# Patient Record
Sex: MALE | Race: WHITE | URBAN - METROPOLITAN AREA
[De-identification: names, ages, dates, MRNs, and addresses within clinical notes are randomized per-mention and may not be internally consistent; named-entity substitution may affect disease eponyms.]

---

## 2017-01-01 ENCOUNTER — APPOINTMENT (OUTPATIENT)
Dept: CT IMAGING | Facility: CLINIC | Age: 77
End: 2017-01-01
Attending: EMERGENCY MEDICINE
Payer: COMMERCIAL

## 2017-01-01 ENCOUNTER — APPOINTMENT (OUTPATIENT)
Dept: GENERAL RADIOLOGY | Facility: CLINIC | Age: 77
End: 2017-01-01
Attending: EMERGENCY MEDICINE
Payer: COMMERCIAL

## 2017-01-01 ENCOUNTER — APPOINTMENT (OUTPATIENT)
Dept: PHYSICAL THERAPY | Facility: CLINIC | Age: 77
End: 2017-01-01
Attending: INTERNAL MEDICINE
Payer: COMMERCIAL

## 2017-01-01 ENCOUNTER — HOSPITAL ENCOUNTER (OUTPATIENT)
Facility: CLINIC | Age: 77
Setting detail: OBSERVATION
Discharge: NURSING, REHAB OR LONG-TERM CARE | End: 2017-04-04
Attending: EMERGENCY MEDICINE | Admitting: INTERNAL MEDICINE
Payer: COMMERCIAL

## 2017-01-01 ENCOUNTER — HOSPITAL ENCOUNTER (EMERGENCY)
Facility: CLINIC | Age: 77
Discharge: HOME | End: 2017-05-31
Attending: EMERGENCY MEDICINE | Admitting: EMERGENCY MEDICINE
Payer: COMMERCIAL

## 2017-01-01 VITALS
SYSTOLIC BLOOD PRESSURE: 104 MMHG | HEART RATE: 86 BPM | BODY MASS INDEX: 25.18 KG/M2 | OXYGEN SATURATION: 100 % | RESPIRATION RATE: 18 BRPM | DIASTOLIC BLOOD PRESSURE: 76 MMHG | TEMPERATURE: 97.3 F | WEIGHT: 170 LBS | HEIGHT: 69 IN

## 2017-01-01 VITALS
TEMPERATURE: 97.8 F | WEIGHT: 168.65 LBS | BODY MASS INDEX: 24.98 KG/M2 | OXYGEN SATURATION: 95 % | HEIGHT: 69 IN | HEART RATE: 67 BPM | RESPIRATION RATE: 16 BRPM | SYSTOLIC BLOOD PRESSURE: 135 MMHG | DIASTOLIC BLOOD PRESSURE: 59 MMHG

## 2017-01-01 DIAGNOSIS — R62.7 ADULT FAILURE TO THRIVE: ICD-10-CM

## 2017-01-01 DIAGNOSIS — R73.9 HYPERGLYCEMIA: ICD-10-CM

## 2017-01-01 DIAGNOSIS — F32.A DEPRESSION, UNSPECIFIED DEPRESSION TYPE: ICD-10-CM

## 2017-01-01 DIAGNOSIS — Z79.4 TYPE 2 DIABETES MELLITUS WITHOUT COMPLICATION, WITH LONG-TERM CURRENT USE OF INSULIN (H): ICD-10-CM

## 2017-01-01 DIAGNOSIS — E86.0 DEHYDRATION: ICD-10-CM

## 2017-01-01 DIAGNOSIS — E11.9 TYPE 2 DIABETES MELLITUS WITHOUT COMPLICATION, WITH LONG-TERM CURRENT USE OF INSULIN (H): ICD-10-CM

## 2017-01-01 DIAGNOSIS — R33.9 URINARY RETENTION: Primary | ICD-10-CM
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
## 2017-01-01 DIAGNOSIS — S09.90XA CLOSED HEAD INJURY, INITIAL ENCOUNTER: ICD-10-CM

## 2017-01-01 LAB
ALBUMIN SERPL-MCNC: 3.1 G/DL (ref 3.4–5)
ALBUMIN UR-MCNC: 30 MG/DL
ALP SERPL-CCNC: 60 U/L (ref 40–150)
ALT SERPL W P-5'-P-CCNC: 20 U/L (ref 0–70)
ANION GAP SERPL CALCULATED.3IONS-SCNC: 5 MMOL/L (ref 3–14)
ANION GAP SERPL CALCULATED.3IONS-SCNC: 7 MMOL/L (ref 3–14)
ANION GAP SERPL CALCULATED.3IONS-SCNC: 9 MMOL/L (ref 3–14)
APPEARANCE UR: CLEAR
AST SERPL W P-5'-P-CCNC: 11 U/L (ref 0–45)
BACTERIA SPEC CULT: NO GROWTH
BASOPHILS # BLD AUTO: 0 10E9/L (ref 0–0.2)
BASOPHILS # BLD AUTO: 0 10E9/L (ref 0–0.2)
BASOPHILS NFR BLD AUTO: 0.2 %
BASOPHILS NFR BLD AUTO: 0.7 %
BILIRUB SERPL-MCNC: 0.3 MG/DL (ref 0.2–1.3)
BILIRUB UR QL STRIP: ABNORMAL
BUN SERPL-MCNC: 24 MG/DL (ref 7–30)
BUN SERPL-MCNC: 25 MG/DL (ref 7–30)
BUN SERPL-MCNC: 34 MG/DL (ref 7–30)
C DIFF TOX B STL QL: NORMAL
CALCIUM SERPL-MCNC: 8.9 MG/DL (ref 8.5–10.1)
CALCIUM SERPL-MCNC: 9.1 MG/DL (ref 8.5–10.1)
CALCIUM SERPL-MCNC: 9.6 MG/DL (ref 8.5–10.1)
CHLORIDE SERPL-SCNC: 102 MMOL/L (ref 94–109)
CHLORIDE SERPL-SCNC: 104 MMOL/L (ref 94–109)
CHLORIDE SERPL-SCNC: 105 MMOL/L (ref 94–109)
CO2 BLDCOV-SCNC: 31 MMOL/L (ref 21–28)
CO2 SERPL-SCNC: 28 MMOL/L (ref 20–32)
CO2 SERPL-SCNC: 28 MMOL/L (ref 20–32)
CO2 SERPL-SCNC: 30 MMOL/L (ref 20–32)
COLOR UR AUTO: YELLOW
CREAT SERPL-MCNC: 0.66 MG/DL (ref 0.66–1.25)
CREAT SERPL-MCNC: 0.67 MG/DL (ref 0.66–1.25)
CREAT SERPL-MCNC: 0.67 MG/DL (ref 0.66–1.25)
CREAT SERPL-MCNC: 0.78 MG/DL (ref 0.66–1.25)
CREAT SERPL-MCNC: 1.06 MG/DL (ref 0.66–1.25)
DIFFERENTIAL METHOD BLD: ABNORMAL
DIFFERENTIAL METHOD BLD: ABNORMAL
EOSINOPHIL # BLD AUTO: 0 10E9/L (ref 0–0.7)
EOSINOPHIL # BLD AUTO: 0.1 10E9/L (ref 0–0.7)
EOSINOPHIL NFR BLD AUTO: 0.1 %
EOSINOPHIL NFR BLD AUTO: 3.2 %
ERYTHROCYTE [DISTWIDTH] IN BLOOD BY AUTOMATED COUNT: 13.7 % (ref 10–15)
ERYTHROCYTE [DISTWIDTH] IN BLOOD BY AUTOMATED COUNT: 15.1 % (ref 10–15)
GFR SERPL CREATININE-BSD FRML MDRD: 68 ML/MIN/1.7M2
GFR SERPL CREATININE-BSD FRML MDRD: ABNORMAL ML/MIN/1.7M2
GFR SERPL CREATININE-BSD FRML MDRD: ABNORMAL ML/MIN/1.7M2
GFR SERPL CREATININE-BSD FRML MDRD: NORMAL ML/MIN/1.7M2
GFR SERPL CREATININE-BSD FRML MDRD: NORMAL ML/MIN/1.7M2
GLUCOSE BLDC GLUCOMTR-MCNC: 110 MG/DL (ref 70–99)
GLUCOSE BLDC GLUCOMTR-MCNC: 130 MG/DL (ref 70–99)
GLUCOSE BLDC GLUCOMTR-MCNC: 137 MG/DL (ref 70–99)
GLUCOSE BLDC GLUCOMTR-MCNC: 157 MG/DL (ref 70–99)
GLUCOSE BLDC GLUCOMTR-MCNC: 167 MG/DL (ref 70–99)
GLUCOSE BLDC GLUCOMTR-MCNC: 175 MG/DL (ref 70–99)
GLUCOSE BLDC GLUCOMTR-MCNC: 177 MG/DL (ref 70–99)
GLUCOSE BLDC GLUCOMTR-MCNC: 179 MG/DL (ref 70–99)
GLUCOSE BLDC GLUCOMTR-MCNC: 184 MG/DL (ref 70–99)
GLUCOSE BLDC GLUCOMTR-MCNC: 191 MG/DL (ref 70–99)
GLUCOSE BLDC GLUCOMTR-MCNC: 193 MG/DL (ref 70–99)
GLUCOSE BLDC GLUCOMTR-MCNC: 198 MG/DL (ref 70–99)
GLUCOSE BLDC GLUCOMTR-MCNC: 199 MG/DL (ref 70–99)
GLUCOSE BLDC GLUCOMTR-MCNC: 199 MG/DL (ref 70–99)
GLUCOSE BLDC GLUCOMTR-MCNC: 200 MG/DL (ref 70–99)
GLUCOSE BLDC GLUCOMTR-MCNC: 203 MG/DL (ref 70–99)
GLUCOSE BLDC GLUCOMTR-MCNC: 208 MG/DL (ref 70–99)
GLUCOSE BLDC GLUCOMTR-MCNC: 210 MG/DL (ref 70–99)
GLUCOSE BLDC GLUCOMTR-MCNC: 211 MG/DL (ref 70–99)
GLUCOSE BLDC GLUCOMTR-MCNC: 211 MG/DL (ref 70–99)
GLUCOSE BLDC GLUCOMTR-MCNC: 226 MG/DL (ref 70–99)
GLUCOSE BLDC GLUCOMTR-MCNC: 228 MG/DL (ref 70–99)
GLUCOSE BLDC GLUCOMTR-MCNC: 229 MG/DL (ref 70–99)
GLUCOSE BLDC GLUCOMTR-MCNC: 235 MG/DL (ref 70–99)
GLUCOSE BLDC GLUCOMTR-MCNC: 236 MG/DL (ref 70–99)
GLUCOSE BLDC GLUCOMTR-MCNC: 237 MG/DL (ref 70–99)
GLUCOSE BLDC GLUCOMTR-MCNC: 250 MG/DL (ref 70–99)
GLUCOSE BLDC GLUCOMTR-MCNC: 252 MG/DL (ref 70–99)
GLUCOSE BLDC GLUCOMTR-MCNC: 254 MG/DL (ref 70–99)
GLUCOSE BLDC GLUCOMTR-MCNC: 263 MG/DL (ref 70–99)
GLUCOSE BLDC GLUCOMTR-MCNC: 280 MG/DL (ref 70–99)
GLUCOSE BLDC GLUCOMTR-MCNC: 290 MG/DL (ref 70–99)
GLUCOSE BLDC GLUCOMTR-MCNC: 292 MG/DL (ref 70–99)
GLUCOSE BLDC GLUCOMTR-MCNC: 300 MG/DL (ref 70–99)
GLUCOSE BLDC GLUCOMTR-MCNC: 304 MG/DL (ref 70–99)
GLUCOSE BLDC GLUCOMTR-MCNC: 315 MG/DL (ref 70–99)
GLUCOSE BLDC GLUCOMTR-MCNC: 326 MG/DL (ref 70–99)
GLUCOSE BLDC GLUCOMTR-MCNC: 331 MG/DL (ref 70–99)
GLUCOSE BLDC GLUCOMTR-MCNC: 345 MG/DL (ref 70–99)
GLUCOSE BLDC GLUCOMTR-MCNC: 345 MG/DL (ref 70–99)
GLUCOSE BLDC GLUCOMTR-MCNC: 351 MG/DL (ref 70–99)
GLUCOSE BLDC GLUCOMTR-MCNC: 367 MG/DL (ref 70–99)
GLUCOSE BLDC GLUCOMTR-MCNC: 370 MG/DL (ref 70–99)
GLUCOSE BLDC GLUCOMTR-MCNC: 71 MG/DL (ref 70–99)
GLUCOSE BLDC GLUCOMTR-MCNC: 87 MG/DL (ref 70–99)
GLUCOSE BLDC GLUCOMTR-MCNC: 87 MG/DL (ref 70–99)
GLUCOSE BLDC GLUCOMTR-MCNC: 93 MG/DL (ref 70–99)
GLUCOSE BLDC GLUCOMTR-MCNC: 95 MG/DL (ref 70–99)
GLUCOSE SERPL-MCNC: 220 MG/DL (ref 70–99)
GLUCOSE SERPL-MCNC: 246 MG/DL (ref 70–99)
GLUCOSE SERPL-MCNC: 273 MG/DL (ref 70–99)
GLUCOSE UR STRIP-MCNC: 500 MG/DL
HBA1C MFR BLD: 6.8 % (ref 4.3–6)
HCO3 BLDV-SCNC: NORMAL MMOL/L (ref 21–28)
HCT VFR BLD AUTO: 34.8 % (ref 40–53)
HCT VFR BLD AUTO: 39.6 % (ref 40–53)
HGB BLD-MCNC: 11.2 G/DL (ref 13.3–17.7)
HGB BLD-MCNC: 13 G/DL (ref 13.3–17.7)
HGB UR QL STRIP: NEGATIVE
HYALINE CASTS #/AREA URNS LPF: ABNORMAL /LPF (ref 0–2)
IMM GRANULOCYTES # BLD: 0 10E9/L (ref 0–0.4)
IMM GRANULOCYTES # BLD: 0 10E9/L (ref 0–0.4)
IMM GRANULOCYTES NFR BLD: 0.2 %
IMM GRANULOCYTES NFR BLD: 0.3 %
INR PPP: 1.06 (ref 0.86–1.14)
INTERPRETATION ECG - MUSE: NORMAL
KETONES UR STRIP-MCNC: 40 MG/DL
LACTATE BLD-SCNC: 1.7 MMOL/L (ref 0.7–2.1)
LACTATE BLD-SCNC: NORMAL MMOL/L (ref 0.7–2.1)
LEUKOCYTE ESTERASE UR QL STRIP: NEGATIVE
LYMPHOCYTES # BLD AUTO: 1.3 10E9/L (ref 0.8–5.3)
LYMPHOCYTES # BLD AUTO: 1.8 10E9/L (ref 0.8–5.3)
LYMPHOCYTES NFR BLD AUTO: 15.4 %
LYMPHOCYTES NFR BLD AUTO: 29.6 %
Lab: NORMAL
MCH RBC QN AUTO: 28.4 PG (ref 26.5–33)
MCH RBC QN AUTO: 28.6 PG (ref 26.5–33)
MCHC RBC AUTO-ENTMCNC: 32.2 G/DL (ref 31.5–36.5)
MCHC RBC AUTO-ENTMCNC: 32.8 G/DL (ref 31.5–36.5)
MCV RBC AUTO: 87 FL (ref 78–100)
MCV RBC AUTO: 89 FL (ref 78–100)
MICRO REPORT STATUS: NORMAL
MONOCYTES # BLD AUTO: 0.5 10E9/L (ref 0–1.3)
MONOCYTES # BLD AUTO: 1.2 10E9/L (ref 0–1.3)
MONOCYTES NFR BLD AUTO: 10.4 %
MONOCYTES NFR BLD AUTO: 10.6 %
NEUTROPHILS # BLD AUTO: 2.4 10E9/L (ref 1.6–8.3)
NEUTROPHILS # BLD AUTO: 8.5 10E9/L (ref 1.6–8.3)
NEUTROPHILS NFR BLD AUTO: 55.7 %
NEUTROPHILS NFR BLD AUTO: 73.6 %
NITRATE UR QL: NEGATIVE
NRBC # BLD AUTO: 0 10*3/UL
NRBC # BLD AUTO: 0 10*3/UL
NRBC BLD AUTO-RTO: 0 /100
NRBC BLD AUTO-RTO: 0 /100
PCO2 BLDV: 48 MM HG (ref 40–50)
PCO2 BLDV: NORMAL MM HG (ref 40–50)
PH BLDV: 7.42 PH (ref 7.32–7.43)
PH BLDV: NORMAL PH (ref 7.32–7.43)
PH UR STRIP: 5 PH (ref 5–7)
PLATELET # BLD AUTO: 179 10E9/L (ref 150–450)
PLATELET # BLD AUTO: 276 10E9/L (ref 150–450)
PO2 BLDV: 45 MM HG (ref 25–47)
PO2 BLDV: NORMAL MM HG (ref 25–47)
POTASSIUM SERPL-SCNC: 4.3 MMOL/L (ref 3.4–5.3)
POTASSIUM SERPL-SCNC: 4.3 MMOL/L (ref 3.4–5.3)
POTASSIUM SERPL-SCNC: 4.6 MMOL/L (ref 3.4–5.3)
PROT SERPL-MCNC: 6.8 G/DL (ref 6.8–8.8)
RBC # BLD AUTO: 3.92 10E12/L (ref 4.4–5.9)
RBC # BLD AUTO: 4.57 10E12/L (ref 4.4–5.9)
RBC #/AREA URNS AUTO: ABNORMAL /HPF (ref 0–2)
SAO2 % BLDV FROM PO2: 81 %
SAO2 % BLDV FROM PO2: NORMAL %
SODIUM SERPL-SCNC: 139 MMOL/L (ref 133–144)
SODIUM SERPL-SCNC: 139 MMOL/L (ref 133–144)
SODIUM SERPL-SCNC: 140 MMOL/L (ref 133–144)
SP GR UR STRIP: 1.02 (ref 1–1.03)
SPECIMEN SOURCE: NORMAL
TROPONIN I SERPL-MCNC: 0.02 UG/L (ref 0–0.04)
TSH SERPL DL<=0.005 MIU/L-ACNC: 0.76 MU/L (ref 0.4–4)
URN SPEC COLLECT METH UR: ABNORMAL
UROBILINOGEN UR STRIP-ACNC: 0.2 EU/DL (ref 0.2–1)
WBC # BLD AUTO: 11.6 10E9/L (ref 4–11)
WBC # BLD AUTO: 4.3 10E9/L (ref 4–11)
WBC #/AREA URNS AUTO: ABNORMAL /HPF (ref 0–2)

## 2017-01-01 PROCEDURE — 25000132 ZZH RX MED GY IP 250 OP 250 PS 637: Performed by: INTERNAL MEDICINE

## 2017-01-01 PROCEDURE — 96372 THER/PROPH/DIAG INJ SC/IM: CPT

## 2017-01-01 PROCEDURE — 00000146 ZZHCL STATISTIC GLUCOSE BY METER IP

## 2017-01-01 PROCEDURE — 25000131 ZZH RX MED GY IP 250 OP 636 PS 637: Mod: GY | Performed by: PHYSICIAN ASSISTANT

## 2017-01-01 PROCEDURE — 70450 CT HEAD/BRAIN W/O DYE: CPT

## 2017-01-01 PROCEDURE — 97161 PT EVAL LOW COMPLEX 20 MIN: CPT | Mod: GP | Performed by: PHYSICAL THERAPIST

## 2017-01-01 PROCEDURE — A9270 NON-COVERED ITEM OR SERVICE: HCPCS | Mod: GY | Performed by: PHYSICIAN ASSISTANT

## 2017-01-01 PROCEDURE — 25000131 ZZH RX MED GY IP 250 OP 636 PS 637: Performed by: HOSPITALIST

## 2017-01-01 PROCEDURE — 36415 COLL VENOUS BLD VENIPUNCTURE: CPT

## 2017-01-01 PROCEDURE — 99207 ZZC CDG-MDM COMPONENT: MEETS MODERATE - UP CODED: CPT | Performed by: INTERNAL MEDICINE

## 2017-01-01 PROCEDURE — 99225 ZZC SUBSEQUENT OBSERVATION CARE,LEVEL II: CPT | Performed by: INTERNAL MEDICINE

## 2017-01-01 PROCEDURE — 25000125 ZZHC RX 250: Performed by: INTERNAL MEDICINE

## 2017-01-01 PROCEDURE — 25000128 H RX IP 250 OP 636: Performed by: INTERNAL MEDICINE

## 2017-01-01 PROCEDURE — 99207 ZZC CDG-CODE CATEGORY CHANGED: CPT | Performed by: INTERNAL MEDICINE

## 2017-01-01 PROCEDURE — 83605 ASSAY OF LACTIC ACID: CPT

## 2017-01-01 PROCEDURE — 25000132 ZZH RX MED GY IP 250 OP 250 PS 637: Performed by: PHYSICIAN ASSISTANT

## 2017-01-01 PROCEDURE — 36415 COLL VENOUS BLD VENIPUNCTURE: CPT | Performed by: INTERNAL MEDICINE

## 2017-01-01 PROCEDURE — G0378 HOSPITAL OBSERVATION PER HR: HCPCS

## 2017-01-01 PROCEDURE — 25000128 H RX IP 250 OP 636: Performed by: PHYSICIAN ASSISTANT

## 2017-01-01 PROCEDURE — 93005 ELECTROCARDIOGRAM TRACING: CPT

## 2017-01-01 PROCEDURE — 99284 EMERGENCY DEPT VISIT MOD MDM: CPT | Mod: 25

## 2017-01-01 PROCEDURE — 25000132 ZZH RX MED GY IP 250 OP 250 PS 637: Performed by: PSYCHIATRY & NEUROLOGY

## 2017-01-01 PROCEDURE — 81001 URINALYSIS AUTO W/SCOPE: CPT | Performed by: INTERNAL MEDICINE

## 2017-01-01 PROCEDURE — 96361 HYDRATE IV INFUSION ADD-ON: CPT

## 2017-01-01 PROCEDURE — 87493 C DIFF AMPLIFIED PROBE: CPT | Performed by: INTERNAL MEDICINE

## 2017-01-01 PROCEDURE — 82565 ASSAY OF CREATININE: CPT | Performed by: INTERNAL MEDICINE

## 2017-01-01 PROCEDURE — 96360 HYDRATION IV INFUSION INIT: CPT

## 2017-01-01 PROCEDURE — 25000131 ZZH RX MED GY IP 250 OP 636 PS 637: Performed by: INTERNAL MEDICINE

## 2017-01-01 PROCEDURE — 99224 ZZC SUBSEQUENT OBSERVATION CARE,LEVEL I: CPT | Performed by: INTERNAL MEDICINE

## 2017-01-01 PROCEDURE — 84484 ASSAY OF TROPONIN QUANT: CPT | Performed by: EMERGENCY MEDICINE

## 2017-01-01 PROCEDURE — 25000125 ZZHC RX 250: Performed by: PHYSICIAN ASSISTANT

## 2017-01-01 PROCEDURE — 99224 ZZC SUBSEQUENT OBSERVATION CARE,LEVEL I: CPT | Performed by: HOSPITALIST

## 2017-01-01 PROCEDURE — 71020 XR CHEST 2 VW: CPT

## 2017-01-01 PROCEDURE — 82803 BLOOD GASES ANY COMBINATION: CPT

## 2017-01-01 PROCEDURE — 85610 PROTHROMBIN TIME: CPT | Performed by: EMERGENCY MEDICINE

## 2017-01-01 PROCEDURE — 99285 EMERGENCY DEPT VISIT HI MDM: CPT | Mod: 25

## 2017-01-01 PROCEDURE — 25000132 ZZH RX MED GY IP 250 OP 250 PS 637: Mod: GY | Performed by: PSYCHIATRY & NEUROLOGY

## 2017-01-01 PROCEDURE — 99222 1ST HOSP IP/OBS MODERATE 55: CPT | Performed by: PSYCHIATRY & NEUROLOGY

## 2017-01-01 PROCEDURE — A9270 NON-COVERED ITEM OR SERVICE: HCPCS | Mod: GY | Performed by: PSYCHIATRY & NEUROLOGY

## 2017-01-01 PROCEDURE — 80048 BASIC METABOLIC PNL TOTAL CA: CPT | Performed by: PHYSICIAN ASSISTANT

## 2017-01-01 PROCEDURE — 36415 COLL VENOUS BLD VENIPUNCTURE: CPT | Performed by: PHYSICIAN ASSISTANT

## 2017-01-01 PROCEDURE — 12000000 ZZH R&B MED SURG/OB

## 2017-01-01 PROCEDURE — 85025 COMPLETE CBC W/AUTO DIFF WBC: CPT | Performed by: EMERGENCY MEDICINE

## 2017-01-01 PROCEDURE — 25000128 H RX IP 250 OP 636: Performed by: EMERGENCY MEDICINE

## 2017-01-01 PROCEDURE — 99207 ZZC MOONLIGHTING INDICATOR: CPT | Performed by: INTERNAL MEDICINE

## 2017-01-01 PROCEDURE — 99207 ZZC CDG-MDM COMPONENT: MEETS LOW - DOWN CODED: CPT | Performed by: HOSPITALIST

## 2017-01-01 PROCEDURE — 99217 ZZC OBSERVATION CARE DISCHARGE: CPT | Performed by: HOSPITALIST

## 2017-01-01 PROCEDURE — 36415 COLL VENOUS BLD VENIPUNCTURE: CPT | Performed by: EMERGENCY MEDICINE

## 2017-01-01 PROCEDURE — 87086 URINE CULTURE/COLONY COUNT: CPT | Performed by: EMERGENCY MEDICINE

## 2017-01-01 PROCEDURE — 80048 BASIC METABOLIC PNL TOTAL CA: CPT | Performed by: EMERGENCY MEDICINE

## 2017-01-01 PROCEDURE — 99207 ZZC CDG-CODE CATEGORY CHANGED: CPT | Performed by: PHYSICIAN ASSISTANT

## 2017-01-01 PROCEDURE — 80053 COMPREHEN METABOLIC PANEL: CPT | Performed by: EMERGENCY MEDICINE

## 2017-01-01 PROCEDURE — 83036 HEMOGLOBIN GLYCOSYLATED A1C: CPT | Performed by: PHYSICIAN ASSISTANT

## 2017-01-01 PROCEDURE — 87040 BLOOD CULTURE FOR BACTERIA: CPT | Performed by: EMERGENCY MEDICINE

## 2017-01-01 PROCEDURE — 99232 SBSQ HOSP IP/OBS MODERATE 35: CPT | Performed by: PSYCHIATRY & NEUROLOGY

## 2017-01-01 PROCEDURE — 84443 ASSAY THYROID STIM HORMONE: CPT | Performed by: INTERNAL MEDICINE

## 2017-01-01 PROCEDURE — 25000132 ZZH RX MED GY IP 250 OP 250 PS 637: Mod: GY | Performed by: PHYSICIAN ASSISTANT

## 2017-01-01 PROCEDURE — 99220 ZZC INITIAL OBSERVATION CARE,LEVL III: CPT | Performed by: PHYSICIAN ASSISTANT

## 2017-01-01 PROCEDURE — 40000193 ZZH STATISTIC PT WARD VISIT: Performed by: PHYSICAL THERAPIST

## 2017-01-01 RX ORDER — AMOXICILLIN 250 MG
2 CAPSULE ORAL 3 TIMES DAILY
Status: DISCONTINUED | OUTPATIENT
Start: 2017-01-01 | End: 2017-01-01 | Stop reason: HOSPADM

## 2017-01-01 RX ORDER — SODIUM CHLORIDE 9 MG/ML
INJECTION, SOLUTION INTRAVENOUS CONTINUOUS
Status: DISCONTINUED | OUTPATIENT
Start: 2017-01-01 | End: 2017-01-01

## 2017-01-01 RX ORDER — ACETAMINOPHEN 650 MG/1
650 SUPPOSITORY RECTAL EVERY 4 HOURS PRN
Status: DISCONTINUED | OUTPATIENT
Start: 2017-01-01 | End: 2017-01-01 | Stop reason: HOSPADM

## 2017-01-01 RX ORDER — DEXTROSE MONOHYDRATE 25 G/50ML
25-50 INJECTION, SOLUTION INTRAVENOUS
Status: DISCONTINUED | OUTPATIENT
Start: 2017-01-01 | End: 2017-01-01 | Stop reason: HOSPADM

## 2017-01-01 RX ORDER — BUPROPION HYDROCHLORIDE 100 MG/1
100 TABLET, EXTENDED RELEASE ORAL 2 TIMES DAILY WITH MEALS
Qty: 60 TABLET | DISCHARGE
Start: 2017-01-01

## 2017-01-01 RX ORDER — BISACODYL 10 MG
10 SUPPOSITORY, RECTAL RECTAL DAILY PRN
COMMUNITY

## 2017-01-01 RX ORDER — NICOTINE POLACRILEX 4 MG
15-30 LOZENGE BUCCAL
Status: DISCONTINUED | OUTPATIENT
Start: 2017-01-01 | End: 2017-01-01 | Stop reason: HOSPADM

## 2017-01-01 RX ORDER — ONDANSETRON 2 MG/ML
4 INJECTION INTRAMUSCULAR; INTRAVENOUS EVERY 6 HOURS PRN
Status: DISCONTINUED | OUTPATIENT
Start: 2017-01-01 | End: 2017-01-01 | Stop reason: HOSPADM

## 2017-01-01 RX ORDER — NALOXONE HYDROCHLORIDE 0.4 MG/ML
.1-.4 INJECTION, SOLUTION INTRAMUSCULAR; INTRAVENOUS; SUBCUTANEOUS
Status: DISCONTINUED | OUTPATIENT
Start: 2017-01-01 | End: 2017-01-01 | Stop reason: HOSPADM

## 2017-01-01 RX ORDER — ACETAMINOPHEN 325 MG/1
650 TABLET ORAL EVERY 4 HOURS PRN
Status: DISCONTINUED | OUTPATIENT
Start: 2017-01-01 | End: 2017-01-01 | Stop reason: HOSPADM

## 2017-01-01 RX ORDER — MIRTAZAPINE 7.5 MG/1
7.5 TABLET, FILM COATED ORAL AT BEDTIME
Status: DISCONTINUED | OUTPATIENT
Start: 2017-01-01 | End: 2017-01-01 | Stop reason: HOSPADM

## 2017-01-01 RX ORDER — MIRTAZAPINE 7.5 MG/1
7.5 TABLET, FILM COATED ORAL AT BEDTIME
Qty: 30 TABLET | DISCHARGE
Start: 2017-01-01

## 2017-01-01 RX ORDER — POLYETHYLENE GLYCOL 3350 17 G/17G
17 POWDER, FOR SOLUTION ORAL DAILY PRN
COMMUNITY

## 2017-01-01 RX ORDER — ACETAMINOPHEN 500 MG
1000 TABLET ORAL 3 TIMES DAILY
Status: DISCONTINUED | OUTPATIENT
Start: 2017-01-01 | End: 2017-01-01 | Stop reason: HOSPADM

## 2017-01-01 RX ORDER — TAMSULOSIN HYDROCHLORIDE 0.4 MG/1
0.4 CAPSULE ORAL DAILY
Qty: 30 CAPSULE | DISCHARGE
Start: 2017-01-01

## 2017-01-01 RX ORDER — TAMSULOSIN HYDROCHLORIDE 0.4 MG/1
0.4 CAPSULE ORAL DAILY
Status: DISCONTINUED | OUTPATIENT
Start: 2017-01-01 | End: 2017-01-01 | Stop reason: HOSPADM

## 2017-01-01 RX ORDER — POLYETHYLENE GLYCOL 3350 17 G/17G
17 POWDER, FOR SOLUTION ORAL DAILY PRN
Status: DISCONTINUED | OUTPATIENT
Start: 2017-01-01 | End: 2017-01-01 | Stop reason: HOSPADM

## 2017-01-01 RX ORDER — ONDANSETRON 4 MG/1
4 TABLET, ORALLY DISINTEGRATING ORAL EVERY 6 HOURS PRN
Status: DISCONTINUED | OUTPATIENT
Start: 2017-01-01 | End: 2017-01-01 | Stop reason: HOSPADM

## 2017-01-01 RX ORDER — BUPROPION HYDROCHLORIDE 100 MG/1
100 TABLET, EXTENDED RELEASE ORAL 2 TIMES DAILY WITH MEALS
Status: DISCONTINUED | OUTPATIENT
Start: 2017-01-01 | End: 2017-01-01 | Stop reason: HOSPADM

## 2017-01-01 RX ORDER — AMOXICILLIN 250 MG
2 CAPSULE ORAL 3 TIMES DAILY
COMMUNITY

## 2017-01-01 RX ORDER — METOCLOPRAMIDE 5 MG/1
5 TABLET ORAL
Status: DISCONTINUED | OUTPATIENT
Start: 2017-01-01 | End: 2017-01-01

## 2017-01-01 RX ADMIN — PANCRELIPASE 24000 UNITS: 60000; 12000; 38000 CAPSULE, DELAYED RELEASE PELLETS ORAL at 09:28

## 2017-01-01 RX ADMIN — SODIUM CHLORIDE: 9 INJECTION, SOLUTION INTRAVENOUS at 21:24

## 2017-01-01 RX ADMIN — ACETAMINOPHEN 1000 MG: 500 TABLET, FILM COATED ORAL at 21:03

## 2017-01-01 RX ADMIN — SENNOSIDES AND DOCUSATE SODIUM 2 TABLET: 8.6; 5 TABLET ORAL at 09:27

## 2017-01-01 RX ADMIN — MIRTAZAPINE 7.5 MG: 7.5 TABLET, FILM COATED ORAL at 22:51

## 2017-01-01 RX ADMIN — ACETAMINOPHEN 1000 MG: 500 TABLET, FILM COATED ORAL at 09:13

## 2017-01-01 RX ADMIN — ACETAMINOPHEN 1000 MG: 500 TABLET, FILM COATED ORAL at 16:08

## 2017-01-01 RX ADMIN — PANCRELIPASE 24000 UNITS: 60000; 12000; 38000 CAPSULE, DELAYED RELEASE PELLETS ORAL at 09:04

## 2017-01-01 RX ADMIN — TAMSULOSIN HYDROCHLORIDE 0.4 MG: 0.4 CAPSULE ORAL at 08:55

## 2017-01-01 RX ADMIN — INSULIN ASPART 4 UNITS: 100 INJECTION, SOLUTION INTRAVENOUS; SUBCUTANEOUS at 22:04

## 2017-01-01 RX ADMIN — BUPROPION HYDROCHLORIDE 100 MG: 100 TABLET, FILM COATED, EXTENDED RELEASE ORAL at 17:58

## 2017-01-01 RX ADMIN — INSULIN ASPART 4 UNITS: 100 INJECTION, SOLUTION INTRAVENOUS; SUBCUTANEOUS at 18:10

## 2017-01-01 RX ADMIN — BUPROPION HYDROCHLORIDE 100 MG: 100 TABLET, FILM COATED, EXTENDED RELEASE ORAL at 17:52

## 2017-01-01 RX ADMIN — SODIUM CHLORIDE 1000 ML: 9 INJECTION, SOLUTION INTRAVENOUS at 18:01

## 2017-01-01 RX ADMIN — INSULIN ASPART 3 UNITS: 100 INJECTION, SOLUTION INTRAVENOUS; SUBCUTANEOUS at 11:02

## 2017-01-01 RX ADMIN — HYDRALAZINE HYDROCHLORIDE 12.5 MG: 25 TABLET ORAL at 16:00

## 2017-01-01 RX ADMIN — SENNOSIDES AND DOCUSATE SODIUM 2 TABLET: 8.6; 5 TABLET ORAL at 22:18

## 2017-01-01 RX ADMIN — ACETAMINOPHEN 1000 MG: 500 TABLET, FILM COATED ORAL at 21:31

## 2017-01-01 RX ADMIN — PANCRELIPASE 24000 UNITS: 60000; 12000; 38000 CAPSULE, DELAYED RELEASE PELLETS ORAL at 14:14

## 2017-01-01 RX ADMIN — ACETAMINOPHEN 1000 MG: 500 TABLET, FILM COATED ORAL at 11:28

## 2017-01-01 RX ADMIN — BUPROPION HYDROCHLORIDE 100 MG: 100 TABLET, FILM COATED, EXTENDED RELEASE ORAL at 09:00

## 2017-01-01 RX ADMIN — MIRTAZAPINE 7.5 MG: 7.5 TABLET, FILM COATED ORAL at 21:39

## 2017-01-01 RX ADMIN — MIRTAZAPINE 7.5 MG: 7.5 TABLET, FILM COATED ORAL at 21:59

## 2017-01-01 RX ADMIN — INSULIN HUMAN 4 UNITS: 100 INJECTION, SUSPENSION SUBCUTANEOUS at 11:29

## 2017-01-01 RX ADMIN — INSULIN ASPART 4 UNITS: 100 INJECTION, SOLUTION INTRAVENOUS; SUBCUTANEOUS at 22:08

## 2017-01-01 RX ADMIN — SODIUM CHLORIDE 1000 ML: 9 INJECTION, SOLUTION INTRAVENOUS at 19:43

## 2017-01-01 RX ADMIN — SODIUM CHLORIDE: 9 INJECTION, SOLUTION INTRAVENOUS at 05:05

## 2017-01-01 RX ADMIN — OMEPRAZOLE 20 MG: 20 CAPSULE, DELAYED RELEASE ORAL at 09:14

## 2017-01-01 RX ADMIN — OMEPRAZOLE 20 MG: 20 CAPSULE, DELAYED RELEASE ORAL at 08:55

## 2017-01-01 RX ADMIN — INSULIN ASPART 3 UNITS: 100 INJECTION, SOLUTION INTRAVENOUS; SUBCUTANEOUS at 21:48

## 2017-01-01 RX ADMIN — INSULIN ASPART 5 UNITS: 100 INJECTION, SOLUTION INTRAVENOUS; SUBCUTANEOUS at 18:22

## 2017-01-01 RX ADMIN — OMEPRAZOLE 20 MG: 20 CAPSULE, DELAYED RELEASE ORAL at 11:28

## 2017-01-01 RX ADMIN — BUPROPION HYDROCHLORIDE 100 MG: 100 TABLET, FILM COATED, EXTENDED RELEASE ORAL at 18:23

## 2017-01-01 RX ADMIN — INSULIN ASPART 3 UNITS: 100 INJECTION, SOLUTION INTRAVENOUS; SUBCUTANEOUS at 21:32

## 2017-01-01 RX ADMIN — BUPROPION HYDROCHLORIDE 100 MG: 100 TABLET, FILM COATED, EXTENDED RELEASE ORAL at 18:58

## 2017-01-01 RX ADMIN — BUPROPION HYDROCHLORIDE 100 MG: 100 TABLET, FILM COATED, EXTENDED RELEASE ORAL at 18:10

## 2017-01-01 RX ADMIN — INSULIN ASPART 3 UNITS: 100 INJECTION, SOLUTION INTRAVENOUS; SUBCUTANEOUS at 21:42

## 2017-01-01 RX ADMIN — INSULIN ASPART 2 UNITS: 100 INJECTION, SOLUTION INTRAVENOUS; SUBCUTANEOUS at 14:14

## 2017-01-01 RX ADMIN — SENNOSIDES AND DOCUSATE SODIUM 2 TABLET: 8.6; 5 TABLET ORAL at 22:01

## 2017-01-01 RX ADMIN — INSULIN ASPART 5 UNITS: 100 INJECTION, SOLUTION INTRAVENOUS; SUBCUTANEOUS at 18:01

## 2017-01-01 RX ADMIN — PANCRELIPASE 24000 UNITS: 60000; 12000; 38000 CAPSULE, DELAYED RELEASE PELLETS ORAL at 17:58

## 2017-01-01 RX ADMIN — PANCRELIPASE 24000 UNITS: 60000; 12000; 38000 CAPSULE, DELAYED RELEASE PELLETS ORAL at 18:23

## 2017-01-01 RX ADMIN — PANCRELIPASE 24000 UNITS: 60000; 12000; 38000 CAPSULE, DELAYED RELEASE PELLETS ORAL at 17:53

## 2017-01-01 RX ADMIN — SODIUM CHLORIDE 1000 ML: 9 INJECTION, SOLUTION INTRAVENOUS at 22:53

## 2017-01-01 RX ADMIN — OMEPRAZOLE 20 MG: 20 CAPSULE, DELAYED RELEASE ORAL at 09:28

## 2017-01-01 RX ADMIN — INSULIN GLARGINE 15 UNITS: 100 INJECTION, SOLUTION SUBCUTANEOUS at 21:47

## 2017-01-01 RX ADMIN — MIRTAZAPINE 7.5 MG: 7.5 TABLET, FILM COATED ORAL at 21:31

## 2017-01-01 RX ADMIN — BUPROPION HYDROCHLORIDE 100 MG: 100 TABLET, FILM COATED, EXTENDED RELEASE ORAL at 09:28

## 2017-01-01 RX ADMIN — RIVAROXABAN 20 MG: 20 TABLET, FILM COATED ORAL at 17:29

## 2017-01-01 RX ADMIN — OMEPRAZOLE 20 MG: 20 CAPSULE, DELAYED RELEASE ORAL at 09:05

## 2017-01-01 RX ADMIN — TAMSULOSIN HYDROCHLORIDE 0.4 MG: 0.4 CAPSULE ORAL at 09:05

## 2017-01-01 RX ADMIN — MIRTAZAPINE 7.5 MG: 7.5 TABLET, FILM COATED ORAL at 22:02

## 2017-01-01 RX ADMIN — BUPROPION HYDROCHLORIDE 100 MG: 100 TABLET, FILM COATED, EXTENDED RELEASE ORAL at 08:55

## 2017-01-01 RX ADMIN — PANCRELIPASE 24000 UNITS: 60000; 12000; 38000 CAPSULE, DELAYED RELEASE PELLETS ORAL at 12:28

## 2017-01-01 RX ADMIN — OMEPRAZOLE 20 MG: 20 CAPSULE, DELAYED RELEASE ORAL at 09:27

## 2017-01-01 RX ADMIN — SENNOSIDES AND DOCUSATE SODIUM 2 TABLET: 8.6; 5 TABLET ORAL at 17:52

## 2017-01-01 RX ADMIN — ACETAMINOPHEN 1000 MG: 500 TABLET, FILM COATED ORAL at 16:06

## 2017-01-01 RX ADMIN — MIRTAZAPINE 7.5 MG: 7.5 TABLET, FILM COATED ORAL at 21:02

## 2017-01-01 RX ADMIN — BUPROPION HYDROCHLORIDE 100 MG: 100 TABLET, FILM COATED, EXTENDED RELEASE ORAL at 11:28

## 2017-01-01 RX ADMIN — SENNOSIDES AND DOCUSATE SODIUM 2 TABLET: 8.6; 5 TABLET ORAL at 09:12

## 2017-01-01 RX ADMIN — PANCRELIPASE 24000 UNITS: 60000; 12000; 38000 CAPSULE, DELAYED RELEASE PELLETS ORAL at 18:17

## 2017-01-01 RX ADMIN — PANCRELIPASE 24000 UNITS: 60000; 12000; 38000 CAPSULE, DELAYED RELEASE PELLETS ORAL at 08:54

## 2017-01-01 RX ADMIN — SENNOSIDES AND DOCUSATE SODIUM 2 TABLET: 8.6; 5 TABLET ORAL at 08:55

## 2017-01-01 RX ADMIN — ACETAMINOPHEN 1000 MG: 500 TABLET, FILM COATED ORAL at 17:51

## 2017-01-01 RX ADMIN — ACETAMINOPHEN 1000 MG: 500 TABLET, FILM COATED ORAL at 17:58

## 2017-01-01 RX ADMIN — SENNOSIDES AND DOCUSATE SODIUM 2 TABLET: 8.6; 5 TABLET ORAL at 16:25

## 2017-01-01 RX ADMIN — PANCRELIPASE 24000 UNITS: 60000; 12000; 38000 CAPSULE, DELAYED RELEASE PELLETS ORAL at 17:29

## 2017-01-01 RX ADMIN — INSULIN ASPART 2 UNITS: 100 INJECTION, SOLUTION INTRAVENOUS; SUBCUTANEOUS at 09:28

## 2017-01-01 RX ADMIN — RIVAROXABAN 20 MG: 20 TABLET, FILM COATED ORAL at 18:58

## 2017-01-01 RX ADMIN — ACETAMINOPHEN 1000 MG: 500 TABLET, FILM COATED ORAL at 22:52

## 2017-01-01 RX ADMIN — PANCRELIPASE 24000 UNITS: 60000; 12000; 38000 CAPSULE, DELAYED RELEASE PELLETS ORAL at 09:14

## 2017-01-01 RX ADMIN — SODIUM CHLORIDE: 9 INJECTION, SOLUTION INTRAVENOUS at 14:27

## 2017-01-01 RX ADMIN — INSULIN ASPART 2 UNITS: 100 INJECTION, SOLUTION INTRAVENOUS; SUBCUTANEOUS at 10:14

## 2017-01-01 RX ADMIN — INSULIN GLARGINE 10 UNITS: 100 INJECTION, SOLUTION SUBCUTANEOUS at 22:02

## 2017-01-01 RX ADMIN — PANCRELIPASE 24000 UNITS: 60000; 12000; 38000 CAPSULE, DELAYED RELEASE PELLETS ORAL at 08:59

## 2017-01-01 RX ADMIN — SENNOSIDES AND DOCUSATE SODIUM 2 TABLET: 8.6; 5 TABLET ORAL at 22:51

## 2017-01-01 RX ADMIN — INSULIN GLARGINE 10 UNITS: 100 INJECTION, SOLUTION SUBCUTANEOUS at 22:04

## 2017-01-01 RX ADMIN — PANCRELIPASE 24000 UNITS: 60000; 12000; 38000 CAPSULE, DELAYED RELEASE PELLETS ORAL at 13:07

## 2017-01-01 RX ADMIN — LIDOCAINE HYDROCHLORIDE 10 ML: 20 JELLY TOPICAL at 18:24

## 2017-01-01 RX ADMIN — ACETAMINOPHEN 1000 MG: 500 TABLET, FILM COATED ORAL at 16:44

## 2017-01-01 RX ADMIN — SODIUM CHLORIDE: 9 INJECTION, SOLUTION INTRAVENOUS at 23:58

## 2017-01-01 RX ADMIN — PANCRELIPASE 24000 UNITS: 60000; 12000; 38000 CAPSULE, DELAYED RELEASE PELLETS ORAL at 13:24

## 2017-01-01 RX ADMIN — RIVAROXABAN 20 MG: 20 TABLET, FILM COATED ORAL at 17:52

## 2017-01-01 RX ADMIN — RIVAROXABAN 20 MG: 20 TABLET, FILM COATED ORAL at 17:46

## 2017-01-01 RX ADMIN — PANCRELIPASE 24000 UNITS: 60000; 12000; 38000 CAPSULE, DELAYED RELEASE PELLETS ORAL at 12:29

## 2017-01-01 RX ADMIN — INSULIN ASPART 4 UNITS: 100 INJECTION, SOLUTION INTRAVENOUS; SUBCUTANEOUS at 18:58

## 2017-01-01 RX ADMIN — INSULIN GLARGINE 15 UNITS: 100 INJECTION, SOLUTION SUBCUTANEOUS at 22:02

## 2017-01-01 RX ADMIN — ACETAMINOPHEN 1000 MG: 500 TABLET, FILM COATED ORAL at 09:27

## 2017-01-01 RX ADMIN — INSULIN GLARGINE 10 UNITS: 100 INJECTION, SOLUTION SUBCUTANEOUS at 22:08

## 2017-01-01 RX ADMIN — TAMSULOSIN HYDROCHLORIDE 0.4 MG: 0.4 CAPSULE ORAL at 09:27

## 2017-01-01 RX ADMIN — OMEPRAZOLE 20 MG: 20 CAPSULE, DELAYED RELEASE ORAL at 09:00

## 2017-01-01 RX ADMIN — OMEPRAZOLE 20 MG: 20 CAPSULE, DELAYED RELEASE ORAL at 09:12

## 2017-01-01 RX ADMIN — INSULIN ASPART 1 UNITS: 100 INJECTION, SOLUTION INTRAVENOUS; SUBCUTANEOUS at 22:03

## 2017-01-01 RX ADMIN — PANCRELIPASE 24000 UNITS: 60000; 12000; 38000 CAPSULE, DELAYED RELEASE PELLETS ORAL at 18:10

## 2017-01-01 RX ADMIN — PANCRELIPASE 24000 UNITS: 60000; 12000; 38000 CAPSULE, DELAYED RELEASE PELLETS ORAL at 11:28

## 2017-01-01 RX ADMIN — SODIUM CHLORIDE 1000 ML: 9 INJECTION, SOLUTION INTRAVENOUS at 01:16

## 2017-01-01 RX ADMIN — ACETAMINOPHEN 1000 MG: 500 TABLET, FILM COATED ORAL at 22:18

## 2017-01-01 RX ADMIN — BUPROPION HYDROCHLORIDE 100 MG: 100 TABLET, FILM COATED, EXTENDED RELEASE ORAL at 10:14

## 2017-01-01 RX ADMIN — INSULIN GLARGINE 7 UNITS: 100 INJECTION, SOLUTION SUBCUTANEOUS at 01:17

## 2017-01-01 RX ADMIN — RIVAROXABAN 20 MG: 20 TABLET, FILM COATED ORAL at 18:18

## 2017-01-01 RX ADMIN — ACETAMINOPHEN 1000 MG: 500 TABLET, FILM COATED ORAL at 18:10

## 2017-01-01 RX ADMIN — PANCRELIPASE 24000 UNITS: 60000; 12000; 38000 CAPSULE, DELAYED RELEASE PELLETS ORAL at 09:13

## 2017-01-01 RX ADMIN — TAMSULOSIN HYDROCHLORIDE 0.4 MG: 0.4 CAPSULE ORAL at 16:45

## 2017-01-01 RX ADMIN — METOCLOPRAMIDE HYDROCHLORIDE 5 MG: 5 TABLET ORAL at 06:49

## 2017-01-01 RX ADMIN — ACETAMINOPHEN 1000 MG: 500 TABLET, FILM COATED ORAL at 09:05

## 2017-01-01 RX ADMIN — SENNOSIDES AND DOCUSATE SODIUM 2 TABLET: 8.6; 5 TABLET ORAL at 16:08

## 2017-01-01 RX ADMIN — BUPROPION HYDROCHLORIDE 100 MG: 100 TABLET, FILM COATED, EXTENDED RELEASE ORAL at 11:04

## 2017-01-01 RX ADMIN — ACETAMINOPHEN 1000 MG: 500 TABLET, FILM COATED ORAL at 22:02

## 2017-01-01 RX ADMIN — INSULIN ASPART 2 UNITS: 100 INJECTION, SOLUTION INTRAVENOUS; SUBCUTANEOUS at 13:08

## 2017-01-01 RX ADMIN — PANCRELIPASE 24000 UNITS: 60000; 12000; 38000 CAPSULE, DELAYED RELEASE PELLETS ORAL at 09:00

## 2017-01-01 RX ADMIN — RIVAROXABAN 20 MG: 20 TABLET, FILM COATED ORAL at 17:59

## 2017-01-01 RX ADMIN — TAMSULOSIN HYDROCHLORIDE 0.4 MG: 0.4 CAPSULE ORAL at 09:14

## 2017-01-01 RX ADMIN — RIVAROXABAN 20 MG: 20 TABLET, FILM COATED ORAL at 18:23

## 2017-01-01 RX ADMIN — ACETAMINOPHEN 1000 MG: 500 TABLET, FILM COATED ORAL at 09:28

## 2017-01-01 RX ADMIN — PANCRELIPASE 24000 UNITS: 60000; 12000; 38000 CAPSULE, DELAYED RELEASE PELLETS ORAL at 09:27

## 2017-01-01 RX ADMIN — BUPROPION HYDROCHLORIDE 100 MG: 100 TABLET, FILM COATED, EXTENDED RELEASE ORAL at 09:13

## 2017-01-01 RX ADMIN — BUPROPION HYDROCHLORIDE 100 MG: 100 TABLET, FILM COATED, EXTENDED RELEASE ORAL at 09:05

## 2017-01-01 RX ADMIN — TAMSULOSIN HYDROCHLORIDE 0.4 MG: 0.4 CAPSULE ORAL at 09:00

## 2017-01-01 RX ADMIN — SODIUM CHLORIDE: 9 INJECTION, SOLUTION INTRAVENOUS at 09:52

## 2017-01-01 RX ADMIN — ACETAMINOPHEN 1000 MG: 500 TABLET, FILM COATED ORAL at 16:25

## 2017-01-01 RX ADMIN — RIVAROXABAN 20 MG: 20 TABLET, FILM COATED ORAL at 18:10

## 2017-01-01 RX ADMIN — BUPROPION HYDROCHLORIDE 100 MG: 100 TABLET, FILM COATED, EXTENDED RELEASE ORAL at 18:18

## 2017-01-01 RX ADMIN — SENNOSIDES AND DOCUSATE SODIUM 1 TABLET: 8.6; 5 TABLET ORAL at 22:02

## 2017-01-01 RX ADMIN — INSULIN ASPART 3 UNITS: 100 INJECTION, SOLUTION INTRAVENOUS; SUBCUTANEOUS at 12:29

## 2017-01-01 RX ADMIN — ACETAMINOPHEN 1000 MG: 500 TABLET, FILM COATED ORAL at 21:39

## 2017-01-01 RX ADMIN — INSULIN GLARGINE 15 UNITS: 100 INJECTION, SOLUTION SUBCUTANEOUS at 21:31

## 2017-01-01 RX ADMIN — BUPROPION HYDROCHLORIDE 100 MG: 100 TABLET, FILM COATED, EXTENDED RELEASE ORAL at 17:29

## 2017-01-01 RX ADMIN — INSULIN ASPART 2 UNITS: 100 INJECTION, SOLUTION INTRAVENOUS; SUBCUTANEOUS at 17:45

## 2017-01-01 RX ADMIN — ACETAMINOPHEN 1000 MG: 500 TABLET, FILM COATED ORAL at 08:55

## 2017-01-01 RX ADMIN — SENNOSIDES AND DOCUSATE SODIUM 2 TABLET: 8.6; 5 TABLET ORAL at 21:02

## 2017-01-01 RX ADMIN — SENNOSIDES AND DOCUSATE SODIUM 2 TABLET: 8.6; 5 TABLET ORAL at 21:39

## 2017-01-01 RX ADMIN — SENNOSIDES AND DOCUSATE SODIUM 2 TABLET: 8.6; 5 TABLET ORAL at 09:05

## 2017-01-01 RX ADMIN — PANCRELIPASE 24000 UNITS: 60000; 12000; 38000 CAPSULE, DELAYED RELEASE PELLETS ORAL at 17:52

## 2017-01-01 RX ADMIN — INSULIN ASPART 2 UNITS: 100 INJECTION, SOLUTION INTRAVENOUS; SUBCUTANEOUS at 11:27

## 2017-01-01 RX ADMIN — PANCRELIPASE 24000 UNITS: 60000; 12000; 38000 CAPSULE, DELAYED RELEASE PELLETS ORAL at 17:46

## 2017-01-01 RX ADMIN — INSULIN GLARGINE 10 UNITS: 100 INJECTION, SOLUTION SUBCUTANEOUS at 21:40

## 2017-01-01 RX ADMIN — PANCRELIPASE 24000 UNITS: 60000; 12000; 38000 CAPSULE, DELAYED RELEASE PELLETS ORAL at 13:41

## 2017-01-01 RX ADMIN — BUPROPION HYDROCHLORIDE 100 MG: 100 TABLET, FILM COATED, EXTENDED RELEASE ORAL at 17:46

## 2017-01-01 RX ADMIN — ACETAMINOPHEN 1000 MG: 500 TABLET, FILM COATED ORAL at 22:01

## 2017-01-01 RX ADMIN — ACETAMINOPHEN 1000 MG: 500 TABLET, FILM COATED ORAL at 08:59

## 2017-01-01 RX ADMIN — BUPROPION HYDROCHLORIDE 100 MG: 100 TABLET, FILM COATED, EXTENDED RELEASE ORAL at 09:27

## 2017-01-01 RX ADMIN — INSULIN GLARGINE 15 UNITS: 100 INJECTION, SOLUTION SUBCUTANEOUS at 22:03

## 2017-01-01 RX ADMIN — INSULIN GLARGINE 7 UNITS: 100 INJECTION, SOLUTION SUBCUTANEOUS at 22:18

## 2017-01-01 RX ADMIN — SENNOSIDES AND DOCUSATE SODIUM 2 TABLET: 8.6; 5 TABLET ORAL at 17:58

## 2017-01-01 RX ADMIN — ACETAMINOPHEN 1000 MG: 500 TABLET, FILM COATED ORAL at 21:59

## 2017-01-01 RX ADMIN — SENNOSIDES AND DOCUSATE SODIUM 2 TABLET: 8.6; 5 TABLET ORAL at 19:52

## 2017-01-01 RX ADMIN — MIRTAZAPINE 7.5 MG: 7.5 TABLET, FILM COATED ORAL at 22:18

## 2017-01-01 RX ADMIN — OMEPRAZOLE 20 MG: 20 CAPSULE, DELAYED RELEASE ORAL at 10:17

## 2017-01-01 ASSESSMENT — ENCOUNTER SYMPTOMS
ABDOMINAL PAIN: 0
FEVER: 0
NAUSEA: 1
WEAKNESS: 1
APPETITE CHANGE: 1
HEADACHES: 1
COUGH: 0
ABDOMINAL PAIN: 0

## 2017-01-01 ASSESSMENT — PAIN DESCRIPTION - DESCRIPTORS: DESCRIPTORS: SHARP

## 2017-03-25 NOTE — IP AVS SNAPSHOT
` `     [place] [address] ONCOLOGY: [phone]                 INTERAGENCY TRANSFER FORM - NOTES (H&P, Discharge Summary, Consults, Procedures, Therapies)   3/25/2017                    Hospital Admission Date: 3/25/2017  REGINALD RILEY   : 1940  Sex: Male        Patient PCP Information     Provider PCP Type    None General         History & Physicals      H&P signed by Isis Smart PA-C at 3/26/2017  7:26 PM  Also signed by Rachel Muniz DO at 3/26/2017  8:20 PM      Author:  Isis Smart PA-C Service:  Hospitalist Author Type:  Physician Assistant - C    Filed:  3/26/2017  7:26 PM Date of Service:  3/25/2017  9:20 PM Note Created:  3/25/2017 10:39 PM    Status:  Attested :  Isis Smart PA-C (Physician Assistant - C)    Cosigner:  Rachel Muniz DO at 3/26/2017  8:20 PM        Attestation signed by Rachel Muniz DO at 3/26/2017  8:20 PM        Physician Attestation   IRachel, have reviewed and discussed with the advanced practice provider their history, physical and plan for Reginald Riley. I did not participate in a shared visit by interviewing or examining the patient and this should be billed as an advanced practice provider only visit.    Rachel Muniz  Date of Service (when I saw the patient): I did not personally see this patient today.                               PRIMARY CARE PROVIDER:  None.      CHIEF COMPLAINT:  Failure to thrive.      HISTORY OF PRESENT ILLNESS:  Reginald Riley is a 76-year-old male with a complicated past medical history including ampullary cancer, status post Whipple procedure on 2015, hypertension, insulin-dependent type 2 diabetes, slow transit constipation, gastroparesis, a questionable history of  undiagnosis of Parkinson's disease, brought to [facility] from his newly established long-term care facility for evaluation of failure to thrive.  History is  obtained entirely from patient's daughter and review of medical record.  The patient underwent a Whipple procedure on 04/2015 for ampullary cancer.  This is reportedly a complicated surgery with a prolonged postoperative recovery.  He required a feeding tube and has had issues with gastroparesis and slow transit constipation since the procedure.  Daughter notes he has never fully returned back to baseline.  He has slowly transitioned over the past few years from living independently into assisted living and now into long-term care.  He was most recently living in an assisted living facility in [place].  In mid-February he was admitted to [facility] for evaluation of DKA in the setting of healthcare-acquired pneumonia.  Additionally, he was diagnosed with atrial fibrillation, initiated on Xarelto.  He was discharged to a transitional care facility due to significant weakness and failure to thrive.  Reportedly, at the transitional care facility he was unwilling or unable to participate in therapy.  He exceeded his number of days Medicare for transitional care facility and so he was discharged to a long-term care facility, [place], 10 days ago.  Daughter notes that over the past 10 days he has refused to get out of bed.  He has refused most of his meals and has been refusing medications.  The facility became concerned and sent him to the Emergency Department for further evaluation.      In the Emergency Department, he was evaluated by Dr. Bailey.  Vitals were stable upon arrival.  Laboratory evaluation was obtained which showed a blood glucose of 246, CBC with WBC 11.6, BNP largely unremarkable.  A chest x-ray was obtained and was negative.  Given the patient's failure to thrive, admission was requested for further evaluation.      Presently the patient is evaluated in the Emergency Department.  He offers few complaints at this time.  He answers questions minimally.  He is alert and oriented to person  "and place.  He does not know the month, but did know the president.  Daughter notes she feels as though her dad has \"lost the will to live\".  The patient does not really elaborate on this.  It does appear as though he was previously evaluated by mental health but has refused pharmacotherapy in the past.  Additionally, he has a questionable diagnosis of Parkinson's.  He has never been evaluated by Neurology.  During a previous hospitalization at [facility] outpatient neurologic workup was recommended; however, it does appear that this was ever pursued.      PAST MEDICAL HISTORY:   1.  Insulin-dependent type 2 diabetes.   2.  History of AML.   3.  History of ampullary cancer, status post Whipple procedure.   4.  Hypertension.   5.  Slow transit constipations with gastroparesis.      PRIOR TO ADMISSION MEDICATIONS:[KG1.1]  Prior to Admission medications    Medication Sig Last Dose Taking? Auth Provider   Prochlorperazine Maleate (COMPAZINE PO) Take 5 mg by mouth every 6 hours as needed for nausea prn Yes Unknown, Entered By History   amylase-lipase-protease (CREON 12) 29812 UNITS CPEP Take 1 capsule by mouth Take with snacks or supplements Give at 20:00 3/24/2017 at 20:00 Yes Unknown, Entered By History   amylase-lipase-protease (CREON 12) 77509 UNITS CPEP Take 2 capsules by mouth 3 times daily (with meals) Take at 08:00, 12:00, 17:00 3/25/2017 at 12:00 Yes Unknown, Entered By History   ACETAMINOPHEN PO Take 1,000 mg by mouth 3 times daily For shoulder pain. 3/25/2017 at 12:00 Yes Unknown, Entered By History   bisacodyl (DULCOLAX) 10 MG Suppository Place 10 mg rectally daily as needed for constipation prn Yes Unknown, Entered By History   METFORMIN HCL PO Take 1,000 mg by mouth 2 times daily (with meals) 3/25/2017 at 8:00 Yes Unknown, Entered By History   insulin lispro (HUMALOG KWIKPEN) 100 UNIT/ML injection Inject 2 Units Subcutaneous 3 times daily (before meals) 07:30, 11:45, 17:15 3/25/2017 at 17:15 Yes Unknown, " Entered By History   insulin glargine (LANTUS) 100 UNIT/ML injection Inject 10 Units Subcutaneous At Bedtime 3/24/2017 at hs Yes Unknown, Entered By History   Omeprazole (PRILOSEC PO) Take 20 mg by mouth every morning 3/25/2017 at 07:00 Yes Unknown, Entered By History   Metoclopramide HCl (REGLAN PO) Take 5 mg by mouth 3 times daily 07:30, 11:30, 17:00 3/25/2017 at 11:30 Yes Unknown, Entered By History   RIVAROXABAN PO Take 20 mg by mouth daily 3/24/2017 at 17:30 Yes Unknown, Entered By History   senna-docusate (SENOKOT-S;PERICOLACE) 8.6-50 MG per tablet Take 2 tablets by mouth 3 times daily 3/25/2017 at 12:00 Yes Unknown, Entered By History   polyethylene glycol (MIRALAX/GLYCOLAX) Packet Take 17 g by mouth daily as needed for constipation prn Yes Unknown, Entered By History[KG1.2]       PAST SURGICAL HISTORY:   1.  Cataracts.   2.  Prostatectomy.   3.  Appendectomy.   4.  ERCP.   5.  Whipple procedure.      FAMILY HISTORY:  Sister with diabetes.      SOCIAL HISTORY:  He is a nonsmoker, does not drink alcohol.  He is .  Currently living in a long-term care facility.      REVIEW OF SYSTEMS:  A 10-point review of systems was completed.  Pertinent positives in the HPI.  All other systems negative.      PHYSICAL EXAMINATION:     GENERAL:  Reginald Riley is a well-developed, well-nourished 76-year-old male who appears his stated age and is lying in bed.   VITAL SIGNS:  Blood pressure is 111/47, heart rate 98, temperature 96.2.   HEENT:  Head normocephalic, atraumatic.  Eyes:  Pupils equal, round, reactive to light.   NECK:  Supple, no adenopathy, no thyromegaly.   CARDIOVASCULAR:  Irregularly irregular, no murmurs noted.   PULMONARY:  Normal effort.  Clear to auscultation bilaterally.   GASTROINTESTINAL:  Abdomen nondistended, nontender, diminished bowel sounds.   EXTREMITIES:  Moves all 4 extremities, trace lower extremity edema.   NEUROLOGIC:  He is alert to person and place, knows the year, but not the month,  knows the president.  Cranial nerves II-XII are grossly intact.      LABORATORY EVALUATION:  Reviewed in Epic.      ASSESSMENT:  Reginald Riley is a 76-year-old male with past medical history of ampullary cancer, AML, hypertension and slow transit constipation as well as gastroparesis history admitted from long-term care facility for failure to thrive.   1.  Failure to thrive.  In discussion with daughter, this sounds like this has been a prolonged drawn out issue since his initial Whipple procedure 04/2015.  I do see that he was previously evaluated by Palliative Care during a hospitalization at [facility].  He has previously seen Psychiatry, but has been resistant to pharmacotherapy in the past.  He has a POLST form from his long-term care facility stating that he is full code.  Briefly discussed options with the family.  Currently, no reversible illness or sign of infection or other acute abnormality.  We discussed possibilities including psychiatry consultation for possible pharmacotherapy of depression.  The patient reports that he is interested in this and this will be ordered.  Additionally, discuss palliative care consult in case more of a hospice type approach is preferred for patient.  Family is very much interested in this.  Patient's daughter notes that the patient's brother who is also an additional power of  will be arriving from [place] tomorrow.  In the interim, we will hydrate gently with IV fluids.  Will consult social work to assist with discharge planning.   2.  Insulin-dependent type 2 diabetes.  Prior to admission regimen includes Lantus 10 units at bedtime,   Humalog 2 units t.i.d. with meals and Metformin 1000 mg twice daily.  The patient has reportedly had significantly diminished oral intake.  His glucose is 246.  Will continue prior to admission Lantus.  Hold metformin.  Will hold meal dose Humalog.  Will place on sliding scale insulin with meals and at bedtime.   3.  History of  ampullary cancer, status post Whipple procedure in 2015 with subsequent gastroparesis and slow transit constipation.  Daughter notes that he is not currently followed by Oncology.  Will continue prior to admission Creon with meals as well as bowel regimen and Reglan.   4.  Atrial fibrillation.  The patient is rate controlled without sho blocking agents.  Will continue prior to admission Xarelto.   5.  Deep venous thrombosis prophylaxis.  Xarelto.      CODE STATUS:  Full code.      The patient was discussed with Dr. Dominga Muniz of the Hospitalist Service who independently interviewed the patient.  She is agreeable to plan.         SOPHIE MUNIZ DO       As dictated by ISIS KATHLEEN PA-C            D: 2017 21:20   T: 2017 22:39   MT: [id number]      Name:     LIANE REIS   MRN:      [id number]        Account:      [id number]   :      1940           Admitted:     [id number]      Document: [id number]       cc: ISIS Muniz DO[KG1.1]        Revision History        User Key Date/Time User Provider Type Action    > KG1.1 3/26/2017  7:26 PM Isis Kathleen PA-C Physician Assistant Carlin RODARTE Sign     KG1.2 3/26/2017  7:25 PM Isis Kathlene PA-C Physician Assistant Carlin RODARTE      [N/A] 3/25/2017 10:39 PM Isis Kathleen PA-C Physician Assistant Carlin RODARTE Edit                     Discharge Summaries      Discharge Summaries by Sanket Hilario DO at 2017  8:06 AM     Author:  Sanket Hilario DO Service:  Hospitalist Author Type:  Physician    Filed:  2017  8:06 AM Date of Service:  2017  8:06 AM Note Created:  3/29/2017  9:32 AM    Status:  Addendum :  Sanket Hilario DO (Physician)         [facility]    Discharge Summary  Hospitalist    Date of Admission:  3/25/2017  Date of Discharge:[KR1.1]  [ZA1.1][ZA1.2]  Discharging Provider:[KR1.1] Korey Andrews[BJ1.1]  Date of Service (when I saw the  patient): 0[KR1.1]4/[ZA1.1]4[ZA1.2]/17[KR1.1]    Discharge Diagnoses[BJ1.1]   Adult failure to thrive  Dehydration[BJ1.2]  Major depression[KR1.1], active and recurrent[BJ1.3]  Urinary retention  Type 2 diabetes mellitus without complication, with long-term current use of insulin (H)[BJ1.2]  History of ampullary cancer, status post Whipple procedure in 04/2015 with subsequent gastroparesis and slow transit constipation  Atrial fibrillation[KR1.1]    History of Present Illness[BJ1.1]   Reginald Riley is a 76-year-old male with past medical history of ampullary cancer, AML, hypertension and slow transit constipation as well as gastroparesis history admitted from long-term[KR1.1] board and[BJ1.3] care facility for failure to thrive. See H&P for detail.[KR1.1]    Hospital Course[BJ1.1]   Reginald Riley was admitted on 3/25/2017.[KR1.1] Reginald Riley is a 76-year-old male with past medical history of ampullary cancer, AML, hypertension and slow transit constipation as well as gastroparesis history admitted from [facility] for failure to thrive.[BJ1.3] The following problems were addressed during his hospitalization:[KR1.1]      Failure to thrive  Per daughter, this is a prolonged drawn out issue since his initial Whipple procedure 04/2015 Due to ampullary cancer. Patient previously evaluated by Palliative Care during a hospitalization at [facility] but did not qualify. He has previously seen Psychiatry, but has been resistant to pharmacotherapy in the past. Currently, no reversible illness such as infection or other acute abnormality that would explain his failure to thrive. He was evaluated by psychiatry and initiated on medications this stay which he now agrees to take (see below.) Palliative care has also evaluated patient and had family conference with his daughter and brother.   - plan is to have him follow up with his therapist and psychiatrist at [facility] for further management including titration of  psychotropic medications. Patient is in agreement with the above plan.   - Per palliative care, he currently does not meet criteria for hospice, however hospice should be considered if depression issue not reversible or pt refuses to continue medication/treatment.   - Neurology also evaluated for possible parkinson symptoms, however unable to assess due to multiple factors, thus recommend f/u as outpatient when acute issues stabilize.       Major depression:  - resistant to pharmacotherapy in the past. Patient currently agrees to take medications.   - appreciate psychiatry consult- started on Wellbutrin  mg twice daily & Remeron this stay  - will schedule f/u appt with his prior therapist and psychiatrist prior to discharge which patient agrees to follow up       Urinary retention:  Has required multiple straight cath due to urinary retention, as a Result garcia catheter has been placed  - recommend voiding trial in 1-2 weeks at TCU, if continues to have issue, would benefit from urology referral  - also started Flomax daily this stay      Type II diabetes:   -resumed home lantus 10 units daily  -discontinued metformin this stay to avoid hypoglycemia with insulin when he chooses not to eat  -meal aspart changed this stay to 1 unit per carb unit three times daily to try to better match insulin to what he eats since it is variable  -follow up with TCU physician and PCP for ongoing management      History of ampullary cancer, status post Whipple procedure in 04/2015 with subsequent gastroparesis and slow transit constipation: Daughter notes that he is not currently followed by Oncology.   - continue with PTA senna- docusate - 2 pills three times daily  - reglan stopped due to prior intolerance  - GI follow up as outpatient for his gastroparesis as arranged and ordered in follow up section      Atrial fibrillation: The patient is rate controlled without sho blocking agents.   - Will continue prior to admission  Xarelto.[BJ1.3]     Significant Results and Procedures[BJ1.1]   TSH normal, UA negative, C. Diff negative, urine culture negative, blood culture negative, chest x-ray negative[BJ1.3]    Pending Results[BJ1.1]   None[BJ1.3]    GENERAL: NAD.   HEENT: Normocephalic. Nares normal.   LUNGS: No dyspnea at rest.   HEART: Extremities perfused.   NEUROLOGIC: Moves extremities x4[ZA1.1]     Code Status[BJ1.1]   Full Code[KR1.1]       Primary Care Physician   None[BJ1.1] on file, will follow with TCU physician[BJ1.3]    Discharge Disposition[BJ1.1]   Discharged to long-term care facility  Condition at discharge: Fair[KR1.1]    Consultations This Hospital Stay[BJ1.1]   SOCIAL WORK IP CONSULT  PSYCHIATRY IP CONSULT  PALLIATIVE CARE ADULT IP CONSULT  NEUROLOGY IP CONSULT  PHYSICAL THERAPY ADULT IP CONSULT  OCCUPATIONAL THERAPY ADULT IP CONSULT[BJ1.2]    Discharge Orders     General info for SNF   Length of Stay Estimate: Long Term Care  Condition at Discharge: Stable  Level of care:skilled   Rehabilitation Potential: Fair  Admission H&P remains valid and up-to-date: Yes  Recent Chemotherapy: N/A  Use Nursing Home Standing Orders: Yes     Mantoux instructions   Give two-step Mantoux (PPD) Per Facility Policy Yes     Glucose monitor nursing POCT   Before meals and at bedtime     Ballard catheter   To straight gravity drainage. Ballard removal in 1-2 weeks to asses ability to void on his own. If still issue with urinary retention, consider urology referral.  If the patient would like a referral to a Urology provider [facility] referral line is:  Central Appointment #: [phone]  Located at: [address] [place] [address], [address]  [place]. [address]     Follow Up and recommended labs and tests   Follow up with Nursing home physician.  No follow up labs or test are needed.  You are scheduled for an initial visit with Dr. Safia Apple on Thursday April 5th at 1 p.m. She will be able to give recommendations for a Psychiatrist at  that time  Located at:  [facility]  Close  [address] [place] [address]  Phone: [phone]    You are scheduled for a [facility] appointment to discuss Dysmotility medications with Physicians Assistant Martinez on April 12tha t 10:20 a.m. Located at:  [facility]   [address] [place] [address],  Suites: [address] (Clinic) / [address] (Endoscopy Center)    [place], [place] [address] [place] [address]  Phone: [phone]     Activity - Up with nursing assistance     Full Code     Physical Therapy Adult Consult   Evaluate and treat as clinically indicated.    Reason:  deconditioning     Occupational Therapy Adult Consult   Evaluate and treat as clinically indicated.    Reason:  deconditioning     Advance Diet as Tolerated   Follow this diet upon discharge: Regular       Discharge Medications   Current Discharge Medication List      START taking these medications    Details   buPROPion (WELLBUTRIN SR) 100 MG 12 hr tablet Take 1 tablet (100 mg) by mouth 2 times daily (with meals)  Qty: 60 tablet    Associated Diagnoses: Depression, unspecified depression type      mirtazapine (REMERON) 7.5 MG TABS tablet Take 1 tablet (7.5 mg) by mouth At Bedtime  Qty: 30 tablet    Associated Diagnoses: Depression, unspecified depression type      tamsulosin (FLOMAX) 0.4 MG capsule Take 1 capsule (0.4 mg) by mouth daily  Qty: 30 capsule    Associated Diagnoses: Urinary retention         CONTINUE these medications which have CHANGED    Details   insulin lispro (HUMALOG KWIKPEN) 100 UNIT/ML injection 1 unit per carb unit with breakfast, lunch and dinner (wait to see how much he eats and then give appropriate coverage)    Associated Diagnoses: Type 2 diabetes mellitus without complication, with long-term current use of insulin (H)         CONTINUE these medications which have NOT CHANGED    Details   Prochlorperazine Maleate (COMPAZINE PO) Take 5 mg by mouth every 6  hours as needed for nausea      !! amylase-lipase-protease (CREON 12) 92779 UNITS CPEP Take 1 capsule by mouth Take with snacks or supplements Give at 20:00      !! amylase-lipase-protease (CREON 12) 81090 UNITS CPEP Take 2 capsules by mouth 3 times daily (with meals) Take at 08:00, 12:00, 17:00      ACETAMINOPHEN PO Take 1,000 mg by mouth 3 times daily For shoulder pain.      bisacodyl (DULCOLAX) 10 MG Suppository Place 10 mg rectally daily as needed for constipation      insulin glargine (LANTUS) 100 UNIT/ML injection Inject 10 Units Subcutaneous At Bedtime      Omeprazole (PRILOSEC PO) Take 20 mg by mouth every morning      RIVAROXABAN PO Take 20 mg by mouth daily      senna-docusate (SENOKOT-S;PERICOLACE) 8.6-50 MG per tablet Take 2 tablets by mouth 3 times daily      polyethylene glycol (MIRALAX/GLYCOLAX) Packet Take 17 g by mouth daily as needed for constipation       !! - Potential duplicate medications found. Please discuss with provider.      STOP taking these medications       METFORMIN HCL PO Comments:   Reason for Stopping:         Metoclopramide HCl (REGLAN PO) Comments:   Reason for Stopping:             Allergies   Allergies   Allergen Reactions     Compazine [Prochlorperazine]      Hmg-Coa-R Inhibitors      Isopropyl Alcohol      Data[BJ1.1]   Most Recent 3 CBC's:[KR1.1]  Recent Labs   Lab Test  03/25/17   1745   WBC  11.6*   HGB  13.0*   MCV  87   PLT  276[BJ1.1]      Most Recent 3 BMP's:[KR1.1]  Recent Labs   Lab Test  04/01/17   0708  03/26/17   0717  03/25/17   1745   NA   --   140  139   POTASSIUM   --   4.3  4.6   CHLORIDE   --   105  102   CO2   --   28  28   BUN   --   25  34*   CR  0.66  0.78  1.06   ANIONGAP   --   7  9   EMELIA   --   9.1  9.6   GLC   --   273*  246*[BJ1.1]     Most Recent 2 LFT's:[KR1.1]  Recent Labs   Lab Test  03/25/17   1745   AST  11   ALT  20   ALKPHOS  60   BILITOTAL  0.3[BJ1.1]     Most Recent INR's and Anticoagulation Dosing History:  Anticoagulation Dose History      There is no flowsheet data to display.        Most Recent 3 Troponin's:[KR1.1]  Recent Labs   Lab Test  03/25/17   1745   TROPI  0.018[BJ1.1]     Most Recent Cholesterol Panel:[KR1.1]No lab results found.[BJ1.1]  Most Recent 6 Bacteria Isolates From Any Culture (See EPIC Reports for Culture Details):[KR1.1]  Recent Labs   Lab Test  03/25/17 2015 03/25/17   1839  03/25/17   1758   CULT  No growth  No growth  No growth[BJ1.1]     Most Recent TSH, T4 and A1c Labs:[KR1.1]  Recent Labs   Lab Test  03/28/17   1210  03/26/17   0717   TSH  0.76   --    A1C   --   6.8*[BJ1.1]     Results for orders placed or performed during the hospital encounter of 03/25/17   XR Chest 2 Views    Narrative    CHEST TWO VIEW   3/25/2017 6:15 PM     HISTORY: Chest pain, shortness of breath.    COMPARISON: None.      Impression    IMPRESSION: Questionable small pleural effusions seen only on the  lateral. Heart size and pulmonary vasculature are normal. No  infiltrates. Degenerative changes noted in right shoulder.    JEVON SMITH MD[KR1.2]        Revision History        User Key Date/Time User Provider Type Action    > ZA1.2 4/4/2017  8:06 AM Sanket Hilario, DO Physician Addend     ZA1.1 4/3/2017  2:09 PM Sanket Hilario, DO Physician Addend     BJ1.1 4/2/2017  3:04 PM Korey Andrews MD Physician Addend     BJ1.2 4/2/2017  2:58 PM Korey Andrews MD Physician      BJ1.3 4/2/2017  2:57 PM Korey Andrews MD Physician      KR1.2 3/29/2017  9:46 AM Zohra Beal MD Physician Sign     KR1.1 3/29/2017  9:32 AM Zohra Beal MD Physician                      Consult Notes      Consults by Myra Hatfield MD at 3/28/2017  1:57 PM     Author:  Myra Hatfield MD Service:  Palliative Author Type:  Physician    Filed:  3/28/2017  4:05 PM Date of Service:  3/28/2017  1:57 PM Note Created:  3/28/2017  1:30 PM    Status:  Signed :  Myra Hatfield MD (Physician)     Consult  Orders:    1. Palliative Care Adult IP Consult: goals of care; Consultant may enter orders: Yes; Patient to be seen: Routine - within 24 hours [[id number]] ordered by Isis Smart PA-C at 03/25/17 2048                [facility]  Palliative Care Consultation    Reginald Riley MRN# [id number]   Age: 76 year old YOB: 1940   Date of Admission: 3/25/2017      Reason for consult: Goals of care  Patient and family support       Requesting physician/service:[SG1.1] HERMINIO Smart, FRANCESCA,[SG1.2] hospitalist       Recommendations/Orders Written[SG1.1]        1)  Return to [facility] on continued care  2)  Continue antidepressant medications, if pt agrees  3)  Pt to reconnect with prior therapist, Dr. Apple at [facility] & associated psychiatrist for medication management  4)  See GI specialist at [facility] for follow-up of alternatives for management of GI dysmotility  - Reglan should not be restarted  5)  Code status should be readdressed when pt capable  6)  Hospice transition should be considered if FTT/depression issues not reversible or pt refuses to continue rx for latter.[SG1.2]     Myra Hatfield MD, Palliative Medicine Physician  Disease Process & Symptoms Assessment     1)  Adult FTT - mild malnutrition  2)  Clinical depression related to prolonged illness recovery[SG1.1], unresolved grief/loss related to failed relationships, loss of home, etc, & elements of essential distress over recurrent life-threatening illnesses[SG1.2]  3)  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 Spiritual? Not so much? Mandaen[SG1.1], uncertain importance[SG1.2]    Are you at peace? Does not appear to be.    What are your concerns, medical and non-medical, that are not being addressed? Won't engage for discussion    Who are your \"go-to\" people when you need support? family    Plan for psychosocial/spiritual support/follow-up:[SG1.1] as above[SG1.2]     Decision-Making & Goals of Care     Discussion/counseling today about prognosis/goals of care/decisions:[SG1.1]   75 minute conversation with family about all of above.  Daughter Svitlana, son Bharat, youngest & only close brother Humza who is primary POA per family report.  Svitlana closer than Bharat to pt.  Reviewed medical hx & life issues.  Pt has recovered from 3 prior CA before ampullary CA dx/surgery.  Apparently expressed to family \"why did I  have 4 cancers??\"   twice with most recent divorce prior to last CA due to financial issues.  Lost all to ex-wife & was homeless for time.  Ex-wife briefly took back into home for rent but neglected his care when he became ill with CA before dx.  Pt distraught by relationship/losses & saw therapist, Dr. Apple at [facility] a few months after surgery with \"good response\" per family.  Then worse as life-style & illness issues evolved to move pt from independent to fully assisted care at current facility.  Pt reports not feeling \"belongs\" there.  Has just escaped into sleep for much time for many months & no interest in anything.  Was reportedly \"very social & liked to cook, eat out, entertain\" in prior life.  Reviewed with family that pt did not have medical illness other than FTT that would take his life & that if he agreed to engage in depression management/grief/loss counseling, he may have a different choice.  Advised that he also has a choice to refuse such treatment even though his emotional state cloud his judgment.  They are " aware if he continues to refuse nutrition/treatment, then hospice care at facility would be appropriate to consider.  Review GI dysmotility issues & potential adverse reaction of Reglan on pt leading to discontinuation.  Advised other potential medications that could be helpful & recommended reevaluation by current GI provider for same.  Code status will need to be reviewed, pending decided course of care.[SG1.2]  Patient has a completed health care directive available in the chart (Y/N): No  Health care agent (only if patient has an available, complete HCD): legally son, daughter[SG1.1] but family reports directive available with brother Humza as POA & Svitlana, dtr as alternative[SG1.2]  There is no POLST (Physician orders for life-sustaining treatment) form on file for this patient but reportedly one from [employer] advising full code[SG1.1] 2 yrs ago prior to surgery.[SG1.2]  Code Status: Full code   Patient has decision-making capacity (Y/N): limited judgment per psych    Coordination of Care     Findings & plan of care discussed with:[SG1.1] unit CM & hospitalist[SG1.2]  Follow-up plan from palliative team:[SG1.1] revisit tomorrow if pt not discharged[SG1.2]    Thank you for the opportunity to participate in the care of this patient and family.      Palliative Physician Attestation:  Total time spent:[SG1.1]   90[SG1.2]   Minutes, with > 50% in counseling and coordination of care.  Myra Hatfield MD; pager [phone]       Chief Complaint     Doesn't voice         History of Present Illness     History obtained from:chart review, pt as possible, family    75 yo WM admitted from SNF with adult FTT after 3 hospitalizations over last month related to chronic medical issues.  Pt had ampullatory CA dxed & resected with Whipple's procedure 4/15 with complications & poor recovery/decline since.  Went from independent to[SG1.1] [facility]DC[SG1.2] living situation[SG1.1] with full care[SG1.2] over last 2 years.  Recent Afib  with RVR controlled now with medication & anticoagulated.[SG1.1] BS regulation problems over last mo due to reduced intake.[SG1.2]   Chronic GI dysmotility on Reglan PTA.  Discontinued due to question of medication -related Parkinson's syndrome.  Mild malnutrition with no desire to eat.  Illness -related depression per Psych started on medication yesterday.  Drowsy today from same.    Family care conference with son/dtr with conclusions/POC as above.          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]  Use of community resources: SNF  Activities/interests: none  History of substance use/abuse:  Reformed smoker            Family History:   I have reviewed this patient's family history           Allergies:[SG1.1]   Allergies   Allergen Reactions     Compazine [Prochlorperazine]      Hmg-Coa-R Inhibitors      Isopropyl Alcohol[SG1.3]               Medications:   I have reviewed this patient's medication profile and medications during this hospitalization  Remeron 7.5 & buproprion 100 mg BID started by psychiatry yesterday; Reglan d/c           Review of Systems:   The comprehensive review of systems is negative other than noted here and in the HPI.    Palliative Symptom Review (0=no symptom/no concern, 1=mild, 2=moderate, 3=severe):      Pain:0      Fatigue: 1-2      Nausea: 0      Constipation: 0      Diarrhea: 0      Depressive Symptoms: yes but doesn't rate      Anxiety: 0      Drowsiness: 2      Poor Appetite: 3      Shortness of Breath:  0      Insomnia: 0      Other: 0      Overall (0 good/no concerns, 3 very poor):  Won't express            Physical Exam:   Vitals were reviewed[SG1.1]  Temp: 95.2  F (35.1  C) Temp src: Axillary BP: 172/57 Pulse: 68 Heart Rate: 72 Resp: 18 SpO2: 97 % O2 Device: None (Room air)[SG1.4]    Constitutional:   Drowsy but awakes, briefly smiles & will engage with body language or soft speech.  NAD     Lungs:   No increased work of breathing, good air exchange, clear to auscultation bilaterally, no crackles or wheezing     Cardiovascular:   Irreg/irreg RR controlled     Abdomen:   Tympanic, hypoactive normal BS, surgical scar well-healed upper abd, nontender     Musculoskeletal:   1-2+edema          Delirium Screen/CAM:  Delirium = (#1 and #2 = YES) + (#3 and/or #4)       Delirium/CAM score  0/4  Interpretation:  Delirium: not present          Data Reviewed:   Alb 3.1, normal renal function, Hbg A1C 6.8, normal CBA, ECG controlled a fib, CXR negative[SG1.1]           Revision History        User Key Date/Time User Provider Type Action    > SG1.2 3/28/2017  4:05 PM Myra Hatfield MD Physician Sign     SG1.3 3/28/2017  1:38 PM Myra Hatfield MD Physician      SG1.4 3/28/2017  1:33 PM Myra Hatfield MD Physician      SG1.1 3/28/2017  1:30 PM Myra Hatfield MD Physician             Consults by Nina Canseco MD at 3/28/2017  7:31 AM     Author:  Nina Canseco MD Service:  Neurology Author Type:  Physician    Filed:  3/28/2017  7:31 AM Date of Service:  3/28/2017  7:31 AM Note Created:  3/28/2017  7:17 AM    Status:  Signed :  Nina Canseco MD (Physician)     Consult Orders:    1. Neurology IP Consult: Patient to be seen: Routine - within 24 hours; poss parkinsons; Consultant may enter orders: Yes [[id number]] ordered by Sara Abdullahi MD at 03/27/17 6915                [facility]    Neurology Consultation      Date of Admission:  3/25/2017  Date of Consult (When I saw the patient):[KK1.1] 03/28/17[KK1.2]    Assessment & Plan   Reginald Riley is a 76 year old male who was admitted on 3/25/2017 for failure to thrive. He has a history of ampullary cancer, afib, depression.  I was asked to see the patient for parkinsonism and confusion.    AMS and parkinsonism:  --he has some bradykinesia, no tremor, appears more psychomotor retardation to me- particularly with him not answering many of my questions  --parkinson's symptoms are difficult to assess inpatient due to multiple factors, can send patient to outpatient neuro clinic when patient is stable for further eval  --apparently had side effects to reglan, so agree with not continuing this  --Consider PT/OT for likely deconditioning  --basic labs unremarkable other than elevated glucose  --Please call if any further symptoms    I discussed the above diagnosis, assessment, and further testing with the patient.  Nina Canseco MD    Code Status    Full Code        Reason for Consult   Reason for consult: I was asked by Dr. Abdullahi to evaluate this patient for confusion and parkinsonsism.      Chief Complaint   pain    History is obtained from the patient and patient's chart    History of Present Illness   Reginald Riley is a 76 year old male who presents with failure to thrive. He has a history of multiple cancers, depression, afib.  He is not answering many of my questions today.  He does report abdominal pain.  He tells me his name and birthdate.  I'm unable to determine if he walks independently.  He was admitted for failure to thrive.  There is thought that depression is playing a role.  He apparently was previously on reglan- this was stopped, I don't see what symptoms he had in the chart- he denies any symptoms of stiff muscles or excess movement on the medication.    Past Medical History   I have reviewed this patient's medical history and updated it with  pertinent information if needed.   Past Medical History:   Diagnosis Date     A-fib (H)      Diabetes (H)      Gastroesophageal reflux disease      Hyperlipidemia      Parkinson disease (H)      Pneumonia        Past Surgical History   I have reviewed this patient's surgical history and updated it with pertinent information if needed.[KK1.1]  History reviewed. No pertinent surgical history.[KK1.3]    Prior to Admission Medications   Prior to Admission Medications   Prescriptions Last Dose Informant Patient Reported? Taking?   ACETAMINOPHEN PO 3/25/2017 at 12:00  Yes Yes   Sig: Take 1,000 mg by mouth 3 times daily For shoulder pain.   METFORMIN HCL PO 3/25/2017 at 8:00  Yes Yes   Sig: Take 1,000 mg by mouth 2 times daily (with meals)   Metoclopramide HCl (REGLAN PO) 3/25/2017 at 11:30  Yes Yes   Sig: Take 5 mg by mouth 3 times daily 07:30, 11:30, 17:00   Omeprazole (PRILOSEC PO) 3/25/2017 at 07:00  Yes Yes   Sig: Take 20 mg by mouth every morning   Prochlorperazine Maleate (COMPAZINE PO) prn  Yes Yes   Sig: Take 5 mg by mouth every 6 hours as needed for nausea   RIVAROXABAN PO 3/24/2017 at 17:30  Yes Yes   Sig: Take 20 mg by mouth daily   amylase-lipase-protease (CREON 12) 50382 UNITS CPEP 3/24/2017 at 20:00  Yes Yes   Sig: Take 1 capsule by mouth Take with snacks or supplements Give at 20:00   amylase-lipase-protease (CREON 12) 95355 UNITS CPEP 3/25/2017 at 12:00  Yes Yes   Sig: Take 2 capsules by mouth 3 times daily (with meals) Take at 08:00, 12:00, 17:00   bisacodyl (DULCOLAX) 10 MG Suppository prn  Yes Yes   Sig: Place 10 mg rectally daily as needed for constipation   insulin glargine (LANTUS) 100 UNIT/ML injection 3/24/2017 at hs  Yes Yes   Sig: Inject 10 Units Subcutaneous At Bedtime   insulin lispro (HUMALOG KWIKPEN) 100 UNIT/ML injection 3/25/2017 at 17:15  Yes Yes   Sig: Inject 2 Units Subcutaneous 3 times daily (before meals) 07:30, 11:45, 17:15   polyethylene glycol (MIRALAX/GLYCOLAX) Packet prn  Yes Yes    Sig: Take 17 g by mouth daily as needed for constipation   senna-docusate (SENOKOT-S;PERICOLACE) 8.6-50 MG per tablet 3/25/2017 at 12:00  Yes Yes   Sig: Take 2 tablets by mouth 3 times daily      Facility-Administered Medications: None     Allergies   Allergies   Allergen Reactions     Compazine [Prochlorperazine]      Hmg-Coa-R Inhibitors      Isopropyl Alcohol        Social History   I have reviewed this patient's social history and updated it with pertinent information if needed. Reginald Riley[KK1.1]  reports that he quit smoking about 31 years ago. He does not have any smokeless tobacco history on file. He reports that he does not drink alcohol.[KK1.3]    Family History   I have reviewed this patient's family history and updated it with pertinent information if needed.   No pertinent family history.    Review of Systems   The 10 point Review of Systems is negative other than noted in the HPI- but was limited as patient not answering many of my questions.    Physical Exam   Temp: 96.4  F (35.8  C) Temp src: Axillary BP: 140/72 Pulse: 68 Heart Rate: 72 Resp: 16 SpO2: 97 % O2 Device: None (Room air)    Vital Signs with Ranges  Temp:  [96  F (35.6  C)-97.8  F (36.6  C)] 96.4  F (35.8  C)  Pulse:  [68] 68  Heart Rate:  [68-75] 72  Resp:  [16-18] 16  BP: (103-140)/(45-72) 140/72  SpO2:  [96 %-99 %] 97 %  170 lbs 6.65 oz    General Appearance:  No acute distress  Neuro:       Mental Status Exam:  A,A, thinks it is 2016, would not try for date, can tell me his birthdate       Cranial Nerves:   CN 2-12 examined and intact       Motor:   5/5 BUE and BLE          Reflexes: 1+ B br, bi, knees, 0 achilles, downgoing toes, no clonus       Sensory:  Nl lt x4       Coordination:  fnf normal on right, would not do on left       Gait:  Deferred- appears fall risk  Neck: no nuchal rigidity. No carotid bruits.    Extremities: No clubbing, no cyanosis, + edema  CV: RRR nl s1, s2  Resp: CTAB        Data   Results for orders  placed or performed during the hospital encounter of 03/25/17 (from the past 24 hour(s))   Glucose by meter   Result Value Ref Range    Glucose 237 (H) 70 - 99 mg/dL   Glucose by meter   Result Value Ref Range    Glucose 290 (H) 70 - 99 mg/dL   Glucose by meter   Result Value Ref Range    Glucose 345 (H) 70 - 99 mg/dL   Glucose by meter   Result Value Ref Range    Glucose 292 (H) 70 - 99 mg/dL           Imaging and procedures:  I personally reviewed these images.  none[KK1.1]         Revision History        User Key Date/Time User Provider Type Action    > KK1.3 3/28/2017  7:31 AM Nina Canseco MD Physician Sign     KK1.2 3/28/2017  7:19 AM Nina Canseco MD Physician      KK1.1 3/28/2017  7:17 AM Nina Canseco MD Physician             Consults by Kevin Murphy MD at 3/27/2017  7:48 AM     Author:  Kevin Murphy MD Service:  Psychiatry Author Type:  Physician    Filed:  3/27/2017  7:48 AM Date of Service:  3/27/2017  7:48 AM Note Created:  3/27/2017  7:37 AM    Status:  Signed :  Kevin Murphy MD (Physician)         [facility] Follow-up Psychiatric Consult Progress Note      Interval History:   Pt seen, chart reviewed, care reviewed with treatment team.Reginald reports he slept well. He is oriented to self. Tolerating medications without side effects. Side effects, risks, and benefits of medications reviewed with patient. Denies suicidal or homicidal ideation. He denies feeling depressed, but is very slowed and hypophonic with c/o poor appetite. I don't see coggwheeling or tremor, and he is off reglan. Palliative care will be seeing him.   Review of systems:    10 point Review of Systems completed is negative other than noted in the HPI,  BP/P/temp, weight reviewed.     Medications:[PR1.1]       buPROPion  100 mg Oral BID w/meals     mirtazapine  7.5 mg Oral At Bedtime     insulin aspart  2 Units Subcutaneous TID w/meals      "acetaminophen (TYLENOL) tablet 1,000 mg  1,000 mg Oral TID     amylase-lipase-protease  2 capsule Oral TID w/meals     omeprazole (priLOSEC) CR capsule 20 mg  20 mg Oral QAM     rivaroxaban ANTICOAGULANT  20 mg Oral QPM     senna-docusate  2 tablet Oral TID     insulin aspart  1-7 Units Subcutaneous TID AC     insulin aspart  1-5 Units Subcutaneous At Bedtime     insulin glargine  10 Units Subcutaneous At Bedtime     sodium chloride (PF)  3 mL Intracatheter Q8H     amylase-lipase-protease, polyethylene glycol, naloxone, acetaminophen, acetaminophen, ondansetron **OR** ondansetron, glucose **OR** dextrose **OR** glucagon, sodium chloride (PF)[PR1.2]      Mental Status Examination:     Appearance:  dressed in hospital scrubs, appeared as age stated, poorly groomed and somnolent  Eye Contact:  intense  Speech:  decreased prosody and paucity of speech  Use of Language:Appropriate  Psychomotor Behavior:  no evidence of tardive dyskinesia, dystonia, or tics and physical retardation  Mood:  good  Affect:  intensity is blunted and intensity is flat  Thought Process:  logical, linear, goal oriented and slowed no loose associations  Thought Content:  no evidence of suicidal ideation or homicidal ideation and no evidence of psychotic thought  Oriented to:  person and day, said \"I'm at [facility]\"  Attention Span and Concentration:  poor  Recent and Remote Memory:  limited  Fund of Knowledge: delayed  Muscle Strength and Tone: weak  Coordination, Station and Gait:not tested  Insight:  limited  Judgment:  limited        Labs/vitals:[PR1.1]     Recent Results (from the past 24 hour(s))   Glucose by meter    Collection Time: 03/26/17  7:44 AM   Result Value Ref Range    Glucose 254 (H) 70 - 99 mg/dL   Clostridium difficile toxin B PCR    Collection Time: 03/26/17 10:30 AM   Result Value Ref Range    Specimen Description Feces     C Diff Toxin B PCR  NEG     Negative  Negative: Clostridium difficile target DNA sequences NOT " detected, presumed   negative for Clostridium difficile toxin B or the number of bacteria present   may be below the limit of detection for the test.   FDA approved assay performed using [employer] GeneXpert real-time PCR.   A negative result does not exclude actual disease due to Clostridium difficile   and may be due to improper collection, handling and storage of the specimen or   the number of organisms in the specimen is below the detection limit of the   assay.     Glucose by meter    Collection Time: 03/26/17  1:49 PM   Result Value Ref Range    Glucose 235 (H) 70 - 99 mg/dL   Glucose by meter    Collection Time: 03/26/17  6:14 PM   Result Value Ref Range    Glucose 351 (H) 70 - 99 mg/dL   Glucose by meter    Collection Time: 03/26/17 10:01 PM   Result Value Ref Range    Glucose 370 (H) 70 - 99 mg/dL   Glucose by meter    Collection Time: 03/27/17  2:05 AM   Result Value Ref Range    Glucose 315 (H) 70 - 99 mg/dL[PR1.2]     B/P: 108/45, T: 96.2, P: 74, R: 20    Impression:   Reginald looks clinically depressed, though parkinsonism, and possibly a component of delirium are present. Reglan could worsen parkinsonism. Palliative care will be seeing him      DIagnoses:   1. 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 Parkinsonism from meds??   3. Delirium, multifactorial suspected       Plan:   1. Written information given on medications. Side effects, risks, benefits reviewed.  2. Continue wellbutrin, low dose remeron for AD effect-these will take time to work  3. Reglan on hold  4. Palliative care to see  5. Consider neuro consult if there is a clearer sense he might have parkinsons-difficult to say in this context with so many variables      Attestation:  Patient has been seen and evaluated by me,[PR1.1]  Kevin Murphy MD[PR1.2]       Revision History        User Key Date/Time User Provider Type Action    > PR1.2 3/27/2017  7:48 AM Kevin Murphy,  "MD Physician Sign     PR1.1 3/27/2017  7:37 AM Kevin Murphy MD Physician             Consults by Kevin Murphy MD at 3/27/2017  7:37 AM     Author:  Kevin Murphy MD Service:  Psychiatry Author Type:  Physician    Filed:  3/27/2017  7:37 AM Date of Service:  3/27/2017  7:37 AM Note Created:  3/27/2017  7:37 AM    Status:  Signed :  Kevin Murphy MD (Physician)     Consult Orders:    1. Psychiatry IP Consult: f/u, assess depression; Consultant may enter orders: Yes; Patient to be seen: Routine; Call back #: [phone] [[id number]] ordered by Kevin Murphy MD at 03/27/17 0649                Pt seen for psychiatric consult follow-up, see my note    Kevin Murphy MD[PR1.1]      Revision History        User Key Date/Time User Provider Type Action    > PR1.1 3/27/2017  7:37 AM Kevin Murphy MD Physician Sign            Consults signed by Kevin Murphy MD at 3/26/2017 11:14 AM      Author:  Kevin Murphy MD Service:  Psychiatry Author Type:  Physician    Filed:  3/26/2017 11:14 AM Date of Service:  3/26/2017  9:22 AM Note Created:  3/26/2017 10:24 AM    Status:  Signed :  Kevin Murphy MD (Physician)         PSYCHIATRY CONSULTATION       REQUESTING PHYSICIAN:  Isis Smart PA-C       REASON FOR CONSULTATION:   Depression.         IDENTIFYING DATA:  Mr. Reginald Riley is an unfortunate 76-year-old  male who presents with failure to thrive in the context of a history of ampullary cancer, status post Whipple procedure in 2015.      CHIEF COMPLAINT:  \"I don't feel good.\"      HISTORY OF PRESENT ILLNESS:  The patient is a 76-year-old gentleman who presents with a failure to thrive picture.  He underwent a Whipple procedure in 2015 for ampullary cancer.  He has been in a long-term care facility.  According to his daughter, he has not been doing well there.  He has had gastroparesis, issues with " constipation and really never got back to baseline after that procedure.  He had been living independently but now is in long-term care.  He was at [facility] with DKA and had pneumonia.  He has a history of atrial fibrillation and got put on Xarelto.  He has become increasingly weak and eventually moved into [place] about 10 days ago.  He is not getting out of bed.  He is not taking his pills and is not eating.  They have not been able to define any specific acute medical issues though there have been questions about whether he might have Parkinson's disease.  He has never seen Neurology.      On interview, the patient is hypophonic, he responds softly and slowly, he has a bit of a piercing stare, does not identify being depressed, but he has some very significant psychomotor slowing and admits that he has no appetite and he cannot sleep at night.  He does not really identify being depressed.  He does not appear grossly tremulous but he seems to be having trouble manipulating his utensils to eat.  I reviewed his labs and I do not see anything grossly abnormal though he did have some glucose in his urine and a few white blood cells as well as an elevated blood sugar on admission.  He is a full code.  They have consulted Palliative Care.      On further questioning, the patient denies a specific history of mental illness, chemical dependency, previous treatment for depression or family history as such.  He seems fully oriented, though very slow to answer my questions.  He has a very limited list of medications at this point, nothing that I would be concerned about as far as delirium.  He is not endorsing any psychosis, generalized anxiety, panic disorder, obsessive-compulsive disorder, past hypomania or mary.      PAST PSYCHIATRIC HISTORY:  Unremarkable.      PAST CHEMICAL DEPENDENCY HISTORY:  Negative.      PAST MEDICAL HISTORY:  Whipple procedure for ampullary cancer, hypertension,  "constipation, gastroparesis, insulin-dependent type 2 diabetes, history of AML and his current presentation with failure to thrive.        LISTED MEDICATIONS:  Currently, IV fluids, Creon, NovoLog insulin, Lantus insulin, Reglan, Prilosec, Xarelto, Senokot-S, p.r.n. Tylenol, p.r.n. Zofran, p.r.n. MiraLax.      FAMILY HISTORY:  Negative for mental illness, chemical dependency or suicide.      SOCIAL HISTORY:  The patient is .  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  He now has been in long-term care because of his failing health.  He comes from a large family of 8 children.        REVIEW OF SYSTEMS:  A 10-point review is unchanged from the initial H&P dated 03/25/2017 by Isis Smart PA-C at 9:20 p.m.  Most recent vital signs:  Temperature 97.6, pulse 66, heart rate 82, respiratory rate 18, blood pressure 142/59, oxygen saturation 96%.      MENTAL STATUS EXAMINATION:  Appearance:  The patient is a slightly disheveled man sitting at the side of his bed, attempting to eat.  He has a blank facial appearance and seems to be moving very slowly.  He is a marginal historian.  Speech is hypophonic, use of language, poor.  Motor exam is slowed and bradyphrenic.  Coordination, station, gait impaired.  Muscle strength and tone are diminished.  Affect blunted.  Mood \"okay.\"  Thought process logical, coherent and goal directed, though he is slow to respond and very impoverished.  Thought content negative for hallucinations, delusions, paranoia, suicidal or homicidal ideation.  Insight and judgment fair.  Cognition:  The patient is alert, oriented x3.  Concentration poor.  Recent memory fair.  Remote memory grossly intact.  General fund of knowledge average.      IMPRESSION:  The patient is a 76-year-old man presenting with a failure to thrive picture.  He looks very bradyphrenic, clinically depressed, though I cannot rule out some parkinsonian type features, given his masked " facial appearance.  I note that he is on Reglan and I would suggest that this be discontinued and could certainly be a contributing factor if there is underlying Parkinson's.  Adding a small dose of Wellbutrin, which is a dopaminergic and more activating antidepressant, is a reasonable course of action as well as adding a small dose of mirtazapine at bedtime to help sleep and promote appetite.  I think it would be reasonable ask Neurology to see him.      DIAGNOSES:     1.  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   2.  Failure to thrive.   3.  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   4.  Type 2 diabetes.   5.  Rule out Parkinson's disease versus parkinsonism from Reglan.      PLAN:   1.  Initiate Wellbutrin  mg b.i.d. along with a small dose of Remeron 7.5 mg each day at bedtime.   2.  Consider Neurology consult.   3.  Discontinue Reglan to see if this improves the situation in the off chance this could be inducing some parkinsonism.   4.  We will follow as needed.   5.  I did try to leave a message with his daughter.  She did not  on her cell phone but I am assuming she will try to call back.         KEVIN TEIXEIRA MD             D: 2017 09:22   T: 2017 10:24   MT: jay      Name:     LIANE REIS   MRN:      [id number]        Account:       [id number]   :      1940           Consult Date:  2017      Document: [id number]       cc: ENOCH KATHLEEN PA-C   [PR1.1]   Revision History        User Key Date/Time User Provider Type Action    > PR1.1 3/26/2017 11:14 AM Kevin Teixeira MD Physician Sign            Consults by Kevin Teixeira MD at 3/26/2017  9:11 AM     Author:  Kevin Teixeira MD Service:  Psychiatry Author Type:  Physician    Filed:  3/26/2017  9:11 AM Date of Service:  3/26/2017  9:11 AM Note Created:  3/26/2017  9:11 AM    Status:  Signed :  Kevin Teixeira MD  (Physician)     Consult Orders:    1. Psychiatry IP Consult: depression and failure to thrive; Consultant may enter orders: Yes; Patient to be seen: Routine; Call back #: [phone] [[phone]] ordered by Isis Smart PA-C at 03/25/17 2048                Pt seen for new psychiatric consultation, see my dictation.    Kevin Murphy MD[PR1.1]      Revision History        User Key Date/Time User Provider Type Action    > PR1.1 3/26/2017  9:11 AM Kevin Murphy MD Physician Sign                     Progress Notes - Physician (Notes from 04/01/17 through 04/04/17)      Progress Notes by Sanket iHlario DO at 4/3/2017  4:00 PM     Author:  Sanket Hilario DO Service:  Hospitalist Author Type:  Physician    Filed:  4/3/2017  4:02 PM Date of Service:  4/3/2017  4:00 PM Note Created:  4/3/2017  4:00 PM    Status:  Signed :  Sanket Hilario DO (Physician)         [facility]  Hospitalist Progress Note        Sanket Hilairo DO  04/03/2017        Interval History:      Patient working with nursing staff. Pending placement.          Assessment and Plan:        Reginald Riley is a 76-year-old male with past medical history of ampullary cancer, AML, hypertension and slow transit constipation as well as gastroparesis history admitted from long-term care facility for failure to thrive.       Failure to thrive  Per daughter, this is a prolonged drawn out issue since his initial Whipple procedure 04/2015 Due to ampullary cancer. Patient previously evaluated by Palliative Care during a hospitalization at [facility] but did not qualify. He has previously seen Psychiatry, but has been resistant to pharmacotherapy in the past. Currently, no reversible illness such as infection or other acute abnormality that would explain his failure to thrive. He was evaluated by psychiatry and initiated on medications this stay which he now agrees to take (see below.) Palliative care  has also evaluated patient and had family conference with his daughter and brother.   - plan is to have him follow up with his therapist and psychiatrist at [facility] for further management including titration of psychotropic medications.   - Per palliative care, he currently does not meet criteria for hospice, however hospice should be considered if depression issue not reversible or pt refuses to continue medication/treatment.   - Neurology also evaluated for possible parkinson symptoms, however unable to assess due to multiple factors, thus recommend f/u as outpatient when acute issues stabilize.       Major depression:  - resistant to pharmacotherapy in the past. Patient currently agrees to take medications.   - appreciate psychiatry consult- started on Wellbutrin  mg twice daily & Remeron this stay  - will schedule f/u appt with his prior therapist and psychiatrist prior to discharge which patient agrees to follow up       Urinary retention:  Has required multiple straight cath due to urinary retention, as a Result garcia catheter has been placed  - recommend voiding trial in 1-2 weeks at TCU, if continues to have issue, would benefit from urology referral  - also started Flomax daily this stay      Type II diabetes:   -better controlled   -adjusted Lantus to 10 units qhs  -meal aspart changed to 1 unit per carb unit three times daily to try to better match insulin to what he eats since it is variable  -discontinued metformin this stay to avoid hypoglycemia with insulin when he chooses not to eat  -continue SSI while in hospital and plan to not use SSI at discharge  -monitor glucose for hypoglycemia/toxicity      History of ampullary cancer, status post Whipple procedure in 04/2015 with subsequent gastroparesis and slow transit constipation: Daughter notes that he is not currently followed by Oncology.   - continue with PTA senna- docusate - 2 pills three times daily  - reglan stopped due to prior  "intolerance  - GI follow up as outpatient for his gastroparesis as arranged and ordered in follow up section      Atrial fibrillation: The patient is rate controlled without sho blocking agents.   - Will continue prior to admission Xarelto.       DVT Prophylaxis: xarelto  Code Status: full code      Disposition: ready for discharge when verified location.                    Physical Exam:      Heart Rate: 88    Blood pressure 129/55, pulse 69, temperature 96.6  F (35.9  C), temperature source Oral, resp. rate 16, height 1.74 m (5' 8.5\"), weight 76.5 kg (168 lb 10.4 oz), SpO2 97 %.    Vitals:    17 0646 17 0558 17 0236   Weight: 76.8 kg (169 lb 5 oz) 80.5 kg (177 lb 7.5 oz) 76.5 kg (168 lb 10.4 oz)       Vital Sign Ranges  Temperature Temp  Av.8  F (36  C)  Min: 96  F (35.6  C)  Max: 97.8  F (36.6  C)   Blood pressure Systolic (24hrs), Av , Min:102 , Max:138        Diastolic (24hrs), Av, Min:42, Max:80      Pulse No Data Recorded   Respirations Resp  Av  Min: 16  Max: 16   Pulse oximetry SpO2  Av.2 %  Min: 96 %  Max: 97 %     Vital Signs with Ranges  Temp:  [96  F (35.6  C)-97.8  F (36.6  C)] 96.6  F (35.9  C)  Heart Rate:  [72-92] 88  Resp:  [16] 16  BP: (102-138)/(42-80) 129/55  SpO2:  [96 %-97 %] 97 %    I/O Last 3 Shifts:   I/O last 3 completed shifts:  In: 250 [P.O.:250]  Out: 1250 [Urine:1250]    I/O past 24 hours:     Intake/Output Summary (Last 24 hours) at 17 1600  Last data filed at 17 1400   Gross per 24 hour   Intake              250 ml   Output             1250 ml   Net            -1000 ml     GENERAL: NAD.   HEENT: Normocephalic. Nares normal.   LUNGS: No dyspnea at rest.   HEART: Extremities perfused.   NEUROLOGIC: Moves extremities x4          Prior to Admission Medications:        Prescriptions Prior to Admission   Medication Sig Dispense Refill Last Dose     Prochlorperazine Maleate (COMPAZINE PO) Take 5 mg by mouth every 6 hours as needed for " nausea   prn     amylase-lipase-protease (CREON 12) 29480 UNITS CPEP Take 1 capsule by mouth Take with snacks or supplements Give at 20:00   3/24/2017 at 20:00     amylase-lipase-protease (CREON 12) 94340 UNITS CPEP Take 2 capsules by mouth 3 times daily (with meals) Take at 08:00, 12:00, 17:00   3/25/2017 at 12:00     ACETAMINOPHEN PO Take 1,000 mg by mouth 3 times daily For shoulder pain.   3/25/2017 at 12:00     bisacodyl (DULCOLAX) 10 MG Suppository Place 10 mg rectally daily as needed for constipation   prn     insulin glargine (LANTUS) 100 UNIT/ML injection Inject 10 Units Subcutaneous At Bedtime   3/24/2017 at hs     Omeprazole (PRILOSEC PO) Take 20 mg by mouth every morning   3/25/2017 at 07:00     RIVAROXABAN PO Take 20 mg by mouth daily   3/24/2017 at 17:30     senna-docusate (SENOKOT-S;PERICOLACE) 8.6-50 MG per tablet Take 2 tablets by mouth 3 times daily   3/25/2017 at 12:00     polyethylene glycol (MIRALAX/GLYCOLAX) Packet Take 17 g by mouth daily as needed for constipation   prn     [DISCONTINUED] METFORMIN HCL PO Take 1,000 mg by mouth 2 times daily (with meals)   3/25/2017 at 8:00     [DISCONTINUED] Metoclopramide HCl (REGLAN PO) Take 5 mg by mouth 3 times daily 07:30, 11:30, 17:00   3/25/2017 at 11:30            Medications:        Current Facility-Administered Medications   Medication Last Rate     Current Facility-Administered Medications   Medication Dose Route Frequency     insulin aspart  1-22 Units Subcutaneous TID AC     insulin aspart  1-16 Units Subcutaneous At Bedtime     insulin glargine  10 Units Subcutaneous At Bedtime     insulin aspart   Subcutaneous TID w/meals     tamsulosin  0.4 mg Oral Daily     buPROPion  100 mg Oral BID w/meals     mirtazapine  7.5 mg Oral At Bedtime     acetaminophen (TYLENOL) tablet 1,000 mg  1,000 mg Oral TID     amylase-lipase-protease  2 capsule Oral TID w/meals     omeprazole (priLOSEC) CR capsule 20 mg  20 mg Oral QAM     rivaroxaban ANTICOAGULANT  20 mg  Oral QPM     senna-docusate  2 tablet Oral TID     sodium chloride (PF)  3 mL Intracatheter Q8H     Current Facility-Administered Medications   Medication Dose Route Frequency     hydrALAZINE  12.5 mg Oral 4x Daily PRN     amylase-lipase-protease  1 capsule Oral With Snacks or Supplements     polyethylene glycol  17 g Oral Daily PRN     naloxone  0.1-0.4 mg Intravenous Q2 Min PRN     acetaminophen  650 mg Oral Q4H PRN     acetaminophen  650 mg Rectal Q4H PRN     ondansetron  4 mg Oral Q6H PRN    Or     ondansetron  4 mg Intravenous Q6H PRN     glucose  15-30 g Oral Q15 Min PRN    Or     dextrose  25-50 mL Intravenous Q15 Min PRN    Or     glucagon  1 mg Subcutaneous Q15 Min PRN     sodium chloride (PF)  3 mL Intracatheter Q1H PRN            Data:      Lab data reviewed.     Recent Labs  Lab 04/01/17  0708   CR 0.66           Imaging:      Imaging data reviewed.     Dr. Sanket Hilario D.O.  [facility] Hospitalist  Pager [phone][ZA1.1]       Revision History        User Key Date/Time User Provider Type Action    > ZA1.1 4/3/2017  4:02 PM Sanket Hilario DO Physician Sign            Progress Notes by Martha Garrison at 4/3/2017  2:43 PM     Author:  Martha Garrison Service:  Social Work Author Type:      Filed:  4/3/2017  3:10 PM Date of Service:  4/3/2017  2:43 PM Note Created:  4/3/2017  2:43 PM    Status:  Addendum :  Martha Garrison ()         Social Work Progress Note     D:   JACKIE following for discharge planning. James from [facility] performed an assessment with patient this AM. JACKIE then spoke with Jessica, admissions at [facility], who confirms they can accept pt onto their Long Term Care unit as soon as a bed opens up. Jessica confirms that [facility], his [place] portion of insurance will cover his room and board at the facility and therefore patient will not have any out-of-pocket expenses. Jessica hopes to know by the end of the day or tomorrow  "when a bed will open up.    JACKIE left voicemail for pt's daughter, Svitlana, updating her on the above information. Per previous conversations wit pt's daughter and chart review, [facility] was one of her first choices for placement. JACKIE also informed Svitlana via voicemail that SW can arrange for w/c ride which will be covered by pt's [place] insurance, as Svitlana is busy today and tomorrow.     JACKIE updated Care Coordinator and bedside RN.     P: SW awaits return call from [facility] about bed availability.     MATILDE [facility]  *[phone]        Social Work Progress Note[NF1.1]- ADDENDUM[NF1.2]      D: [NF1.1] Jessica at the [facility] confirms they have a bed available for pt tomorrow. JACKIE updated pt and he is agreeable to this plan, stating \"Okay, so I leave tomorrow? Can you let my brother know?\". JACKIE left voicemail for pt's brother, Humza, regarding discharge plan. JACKIE arranged for w/c ride to [facility] via [employer] for tomorrow at 1045am.     JACKIE updated Care Coordinator, bedside RN and charge RN.[NF1.3]     P:[NF1.1] SW needs to complete a PAS, per Jessica.[NF1.3]     MATILDE [facility]  *[phone][NF1.1]         Revision History        User Key Date/Time User Provider Type Action    > [N/A] 4/3/2017  3:10 PM Martha Garrison  Addend     NF1.3 4/3/2017  3:07 PM Martha Garrison  Addend     NF1.2 4/3/2017  3:03 PM Martha Garrison  Incomplete Revision     NF1.1 4/3/2017  2:47 PM Martha Garrison  Sign            Progress Notes by Martha Garrison at 4/3/2017 10:34 AM     Author:  Martha Garrison Service:  Social Work Author Type:      Filed:  4/3/2017  2:33 PM Date of Service:  4/3/2017 10:34 AM Note Created:  4/3/2017 10:34 AM    Status:  Addendum :  Martha Garrison ()         Social Work Progress Note     D:  SW received voicemail from pt's daughter yesterday, asking SW to send referrals to: the [facility][NF1.1] (referral was sent multiple times- no " "response yet)[NF1.2], [facility] in [place][NF1.1] (referral sent last week)[NF1.3], and [facility] in [place]. In her message, Svitlana also stated she was not available to meet or have family conference on Monday or Tuesday as she as working and going to school.     A/I: JACKIE reviewed chart and SW notes and called dtr Svitlana back. Per chart review, pt's son Bharat (ph: [phone]) met with pt, JACKIE Jimenez and PT yesterday. Per their conversation, pt and son were agreeable with pt returning to [facility] TCU then transition back to the board and care unit at [facility]. Pt was alert and ambulating with staff yesterday. JACKIE had sent referral to Christ at [facility] TCU who stated they did not have an open bed yesterday but would have one today.    JACKIE updated pt's daughter on the above and she replied \"What are you talking about? We don't want a TCU. No one wants a TCU. My brother  made it clear to that lady yesterday that he was only there to visit pt and that he has no decision making power, so going to [facility] is not an option,. We just moved my dad's stuff out of his board and care yesterday\". JACKIE read portions of previous JACKIE note to pt's daughter, stating that both son and pt were okay with plan to return to TCU at [facility]. Pt's daughter responded \"Go ahead and call Bharat. He will agree with anything I say\", and then provided Bharat's phone number to JACKIE. Pt's daughter then stated \"My uncle and I are the co-decision makers\". JACKIE asked if she had paperwork for this and she did not respond. JACKIE asked again if she could provide paperwork for the record and she stated \"Well not today or tomorrow. I'm working today and tomorrow. I cannot fax it or e-mail it\". JACKIE then inquired if [facility] had paperwork and she stated she didn't know; pt's daughter stated her uncle may have paperwork.     SW asked pt's dtr if she was avail;able for family conference so all members could[NF1.1] " "be[NF1.4] present; she stated she wasn't in the next day or two. She went on to say \"Why do we need another one. We had one yesterday and we are all on the same page.You people are the ones that can't get it right\". JACKIE asked when the family conference was and she replied \"At my house\". JACKIE explained we would need one with pt's treatment team and pt's daughter responded \"We did that last week. Why do we need another one; you can't take good notes? How am I supposed to place my dad at a LTC facility if you can't even do it\".     JACKIE attempted to speak with kevin Jennings at [facility] (ph: [phone]) to inquire if they had POA/HCA paperwork.[NF1.1]     JACKIE called and left message for pt's brother, Humza, ([phone]) to request copies of POA[NF1.2]/[NF1.4]HCA paperwork.    JACKIE updated Care Coordinator.[NF1.2]        P: Will continue to follow for support and d/c planning.      MATILDE [facility]  *[phone]            Social Work Progress Note[NF1.1]- ADDENDUM[NF1.5]     D:[NF1.1] Jessica at the [facility] called regarding referral. Jessica states their admissions representative, Jan, will come meet pt today to assess in person.[NF1.5]    MATILDE [facility]  *[phone][NF1.1]        Revision History        User Key Date/Time User Provider Type Action    > NF1.4 4/3/2017  2:33 PM Martha Garrison  Addend     NF1.5 4/3/2017 10:54 AM Martha Garrison  Addend     NF1.3 4/3/2017 10:51 AM Martha Garrison  Sign     NF1.2 4/3/2017 10:48 AM Martha Garrison       NF1.1 4/3/2017 10:34 AM Martha Garrison              Progress Notes by Belén Pagan RD, ANASTASIYA at 4/3/2017 12:20 PM     Author:  Belén Pagan RD, ANASTASIYA Service:  Nutrition Author Type:  Registered Dietitian    Filed:  4/3/2017 12:27 PM Date of Service:  4/3/2017 12:20 PM Note Created:  4/3/2017 12:20 PM    Status:  Signed :  Belén Pagan RD, ANASTASIYA (Registered Dietitian)         CLINICAL NUTRITION SERVICES - " REASSESSMENT NOTE    EVALUATION OF PROGRESS TOWARD GOALS   Diet:  Regular  Ensure Plus BID between meals    Intake:  Pt reports eating 50% of most meals. His appetite kind of comes a goes, generally it's not very good. He does like the ensure and consumes most of it. Per RN documentation, pt is eating % of meals.    Lunch today refused.      Dosing Weight 74 kg (lowest weight since admission)     ASSESSED NUTRITION NEEDS:  Estimated Energy Needs: 6171-1887 kcals (30-35 Kcal/Kg)  Justification: repletion  Estimated Protein Needs:  grams protein (1.2-1.5 g pro/Kg)  Justification: Repletion and preservation of lean body mass    Previous Goals:   Pt will consume at least 75% of meals TID and 100% of supplements BID.   Evaluation: Not met    Previous Nutrition Diagnosis:   Inadequate oral intake related to refusing meals as evidenced by 3.8% weight loss, history of poor intake and mild muscle loss.   Evaluation: ongoing, updated    CURRENT NUTRITION DIAGNOSIS  Inadequate oral intake related to decreased appetite as evidenced by % of meals consumed, pt report of poor appetite.     INTERVENTIONS  Recommendations / Nutrition Prescription  Regular diet  Ensure Plus BID  Room service appropriate w/ assist    Implementation  Education: encouraged pt to drink all of his Ensure supplement if having ongoing reduced appetite.     Goals  Pt to consume average of 50% of meals TID + 75% of supps.       MONITORING AND EVALUATION:  Progress towards goals will be monitored and evaluated per protocol and Practice Guidelines    Belén Pagan RD, ANASTASIYA[AK1.1]       Revision History        User Key Date/Time User Provider Type Action    > AK1.1 4/3/2017 12:27 PM Belén Pagan RD, ANASTASIYA Registered Dietitian Sign            Progress Notes by Barbara Watson at 4/2/2017 11:32 AM     Author:  Barbara Watson Service:  (none) Author Type:      Filed:  4/2/2017  3:07 PM Date of Service:  4/2/2017 11:32 AM Note Created:   "4/2/2017 11:32 AM    Status:  Addendum :  Barbara Watson ()         SWS  D:  SWS following pt for LTC placement.  I:  SW has been in contact with the following facilities:  [facility]-pt looks appropriate for LTC, however, no open LTC bed.  Admissions will keep pt referral for weekday admissions staff.  [facility]- SW emailed pt referral today as requested as Friday referral info could not be located and fax is down.  [facility]- cannot assess pt for LTC placement until Monday  [facility]- SW left message for admissions, awaiting return call.  P:  SW will call daughter to get more facility choices.[LL1.1]    Update:  [facility] cannot review pts for LTC placement on weekends, just TCU.  [facility] will keep pt referral info until tomorrow when admissions can review for LTC.  PT is no longer working with pt due to obs status.  It is still unclear what plan is for pt because at last PT session, pt showed motivation to rehab, however, family indicates preference for LTC placement.  PT and SW to meet with pt together later this afternoon.  SW left message for loy Shane to come to hospital to be part of this discussion or call SW to discuss plan, awaiting call back.[LL1.2]    Update at 1354:  SW and PT Chayo met with pt.  SW explained to pt that pt cannot return to [facility] board and care at this time as he is requiring too much staff assistance for board and care unit.  When JACKIE asked if pt liked it at [facility], pt nodded yes and said it was \"well organized\".   When SW asked pt if he was wanting to return to [facility] and is willing to work with therapy, pt indicated yes.  When JACKIE asked pt about his apartment on [address], pt indicated he no longer has the apartment.  Pt also informed SW that his son Bharat lives in [place].  Pt had no further questions for SW and when JACKIE suggested SW would work on pt potential return to [facility] for TCU, pt responded \"good\".  Loy Shane has not " "been at hospital yet today and has not returned SW call.  SW then spoke to Christ at [facility] and explained pt desire to return to [facility]CU so he can eventually return to [facility] and care.  Per Christ, [facility] does not have an open male bed for pt today, but likely will tomorrow and they can accept pt then.  Christ requested updated notes on pt-referral sent by DOD process.  SW will attempt to speak to loy Shane later today about pt's interest in [facility] to [facility] [place] (TCU).[LL1.3]    Update:  Around 1430, son Bharat came to visit pt.  Visit was brief, however, SW explained goal for pt to return to [facility] and care after TCU stay.  Son understands and is in agreement with this plan and will discuss with sister Svitlana.  Son wishes pt showed more interest by watching TV or reading a book \"he just sleeps\".  Pt appears more alert today, has ambulated with staff, and is now taking psych meds:some improvements noted.  SW has not been able to reach loy Shane.  Dr. Andrews is also in agreement with proposed plan.[LL1.4]     Revision History        User Key Date/Time User Provider Type Action    > LL1.4 4/2/2017  3:07 PM Barbara Watson  Addend     LL1.3 4/2/2017  2:05 PM Barbara Watson  Addend     LL1.2 4/2/2017 12:25 PM Barbara Watson  Addend     LL1.1 4/2/2017 11:36 AM Barbara Watson  Sign            Progress Notes by Korey Andrews MD at 4/2/2017  2:34 PM     Author:  Korey Andrews MD Service:  Hospitalist Author Type:  Physician    Filed:  4/2/2017  2:57 PM Date of Service:  4/2/2017  2:34 PM Note Created:  4/2/2017  2:34 PM    Status:  Signed :  Korey Andrews MD (Physician)         [facility]    Hospitalist Progress Note    Assessment & Plan   Reginald Riley is a 76-year-old male with past medical history of ampullary cancer, AML, hypertension and slow transit constipation as well " as gastroparesis history admitted from long-term care facility for failure to thrive.       Failure to thrive  Per daughter, this is a prolonged drawn out issue since his initial Whipple procedure 04/2015 Due to ampullary cancer. Patient previously evaluated by Palliative Care during a hospitalization at [facility] but did not qualify. He has previously seen Psychiatry, but has been resistant to pharmacotherapy in the past. Currently, no reversible illness such as infection or other acute abnormality that would explain his failure to thrive. He was evaluated by psychiatry and initiated on medications this stay which he now agrees to take (see below.) Palliative care has also evaluated patient and had family conference with his daughter and brother.   - plan is to have him follow up with his therapist and psychiatrist at [facility] for further management including titration of psychotropic medications. Patient is in agreement with the above plan.   - Per palliative care, he currently does not meet criteria for hospice, however hospice should be considered if depression issue not reversible or pt refuses to continue medication/treatment.   - Neurology also evaluated for possible parkinson symptoms, however unable to assess due to multiple factors, thus recommend f/u as outpatient when acute issues stabilize.       Major depression:  - resistant to pharmacotherapy in the past. Patient currently agrees to take medications.   - appreciate psychiatry consult- started on Wellbutrin  mg twice daily & Remeron this stay  - will schedule f/u appt with his prior therapist and psychiatrist prior to discharge which patient agrees to follow up       Urinary retention:  Has required multiple straight cath due to urinary retention, as a Result garcia catheter has been placed  - recommend voiding trial in 1-2 weeks at TCU, if continues to have issue, would benefit from urology referral  - also started Flomax daily this stay      Type II  diabetes:   -better controlled today  -decreased Lantus to 7 units qhs  -meal aspart changed to 1 unit per carb unit three times daily to try to better match insulin to what he eats since it is variable  -discontinued metformin this stay[BJ1.1] to avoid hypoglycemia with insulin when he chooses not to eat[BJ1.2]  -continue high SSI while in hospital and[BJ1.1] plan to not use SSI at discharge[BJ1.2]  -monitor glucose for hypoglycemia/toxicity      History of ampullary cancer, status post Whipple procedure in 04/2015 with subsequent gastroparesis and slow transit constipation: Daughter notes that he is not currently followed by Oncology.   - continue with PTA senna- docusate - 2 pills three times daily  - reglan stopped due to prior intolerance  - GI follow up as outpatient for his gastroparesis[BJ1.1] as arranged and ordered in follow up section[BJ1.2]      Atrial fibrillation: The patient is rate controlled without sho blocking agents.   - Will continue prior to admission Xarelto.      DVT Prophylaxis: xarelto  Code Status: full code     Disposition: ready for discharge,[BJ1.1] discharge orders and summary updated on 4/2;[BJ1.2] awaiting placement, SW was looking into LTC but none available and now with improving energy and participation we are considering transfer back to TCU[BJ1.1] on 4/3 wi[BJ1.2]th the goal of getting stronger and eventually back to his board and care facility (services somewhere between LTC and assisted living but would need to be more independent to go back there)    Korey Andrews MD  [phone] (C)  [phone] (P)  Text Page (7 am to 6 pm)    Interval History   Went on walk again this morning.  Is getting out to walk with nursing more.  Continues to be observation care and looking into options of TCU versus LTC and SW working with family.  Does arouse and talk to me.  No complaints. Depressed still with flat affect but does have more energy.    -Data reviewed today: I reviewed all  new labs and imaging results over the last 24 hours. I personally reviewed no images or EKG's today.    Physical Exam   Temp: 96.6  F (35.9  C) Temp src: Oral BP: 106/51 Pulse: 69 Heart Rate: 79 Resp: 16 SpO2: 96 % O2 Device: None (Room air)    Vitals:    03/30/17 0550 04/01/17 0646 04/02/17 0558   Weight: 81.2 kg (179 lb 1.6 oz) 76.8 kg (169 lb 5 oz) 80.5 kg (177 lb 7.5 oz)     Vital Signs with Ranges  Temp:  [96  F (35.6  C)-97.2  F (36.2  C)] 96.6  F (35.9  C)  Pulse:  [69] 69  Heart Rate:  [70-79] 79  Resp:  [16-20] 16  BP: (106-128)/(46-59) 106/51  SpO2:  [95 %-98 %] 96 %  I/O last 3 completed shifts:  In: 360 [P.O.:360]  Out: 875 [Urine:875]    Constitutional: Sleeping but arouses, cooperative, no apparent distress, flat affect  Respiratory: Clear to auscultation bilaterally, no crackles or wheezing  Cardiovascular: Regular rate and rhythm, normal S1 and S2, and no murmur noted  GI: Normal bowel sounds, soft, non-distended, non-tender  Skin/Integumen: No rashes, no cyanosis, 1+ leg edema bilaterally  Other:     Medications        insulin aspart  1-10 Units Subcutaneous TID AC     insulin aspart  1-7 Units Subcutaneous At Bedtime     insulin aspart   Subcutaneous TID w/meals     insulin glargine  7 Units Subcutaneous At Bedtime     tamsulosin  0.4 mg Oral Daily     buPROPion  100 mg Oral BID w/meals     mirtazapine  7.5 mg Oral At Bedtime     acetaminophen (TYLENOL) tablet 1,000 mg  1,000 mg Oral TID     amylase-lipase-protease  2 capsule Oral TID w/meals     omeprazole (priLOSEC) CR capsule 20 mg  20 mg Oral QAM     rivaroxaban ANTICOAGULANT  20 mg Oral QPM     senna-docusate  2 tablet Oral TID     sodium chloride (PF)  3 mL Intracatheter Q8H       Data     Recent Labs  Lab 04/01/17  0708   CR 0.66       No results found for this or any previous visit (from the past 24 hour(s)).[BJ1.1]       Revision History        User Key Date/Time User Provider Type Action    > BJ1.2 4/2/2017  2:57 PM Korey Andrews MD  Physician Sign     BJ1.1 4/2/2017  2:34 PM Korey Andrews MD Physician             Progress Notes by Korey Andrews MD at 4/1/2017  3:42 PM     Author:  Korey Andrews MD Service:  Hospitalist Author Type:  Physician    Filed:  4/1/2017  3:48 PM Date of Service:  4/1/2017  3:42 PM Note Created:  4/1/2017  3:42 PM    Status:  Signed :  Korey Andrews MD (Physician)         [facility]    Hospitalist Progress Note    Assessment & Plan   Reginald Riley is a 76-year-old male with past medical history of ampullary cancer, AML, hypertension and slow transit constipation as well as gastroparesis history admitted from long-term care facility for failure to thrive.       Failure to thrive  Per daughter, this is a prolonged drawn out issue since his initial Whipple procedure 04/2015 Due to ampullary cancer. Patient previously evaluated by Palliative Care during a hospitalization at [facility] but did not qualify. He has previously seen Psychiatry, but has been resistant to pharmacotherapy in the past. Currently, no reversible illness such as infection or other acute abnormality that would explain his failure to thrive. He was evaluated by psychiatry and initiated on medications this stay which he now agrees to take (see below.) Palliative care has also evaluated patient and had family conference with his daughter and brother.   - plan is to have him follow up with his therapist and psychiatrist at [facility] for further management including titration of psychotropic medications. Patient is in agreement with the above plan.   - Per palliative care, he currently does not meet criteria for hospice, however hospice should be considered if depression issue not reversible or pt refuses to continue medication/treatment.   - Neurology also evaluated for possible parkinson symptoms, however unable to assess due to multiple factors, thus recommend f/u as outpatient when acute issues stabilize.       Major  depression:  - resistant to pharmacotherapy in the past. Patient currently agrees to take medications.   - appreciate psychiatry consult- started on Wellbutrin  mg twice daily & Remeron this stay  - will schedule f/u appt with his prior therapist and psychiatrist prior to discharge which patient agrees to follow up       Urinary retention:  Has required multiple straight cath due to urinary retention, as a Result garcia catheter has been placed  - recommend voiding trial in 1-2 weeks at TCU, if continues to have issue, would benefit from urology referral  - also started Flomax daily this stay      Type II diabetes:   -uncontrolled with hyperglycemia  -4 units NPH x 1 this AM  -increase Lantus to 13 units qhs  -increase meal aspart to 4 units TID (hold if not eating)  -plan to resume metformin at discharge  -changed to high SSI  -monitor glucose for hypoglycemia/toxicity      History of ampullary cancer, status post Whipple procedure in 04/2015 with subsequent gastroparesis and slow transit constipation: Daughter notes that he is not currently followed by Oncology.   - continue with PTA senna- docusate - 2 pills three times daily  - reglan stopped due to prior intolerance  - GI follow up as outpatient for his gastroparesis      Atrial fibrillation: The patient is rate controlled without sho blocking agents.   - Will continue prior to admission Xarelto.      DVT Prophylaxis: xarelto  Code Status: full code     Disposition: ready for discharge, awaiting placement    Korey Andrews MD  [phone] (C)  [phone] (P)  Text Page (7 am to 6 pm)    Interval History   Went on walk this morning but is warn out now and sleeping.  Does arouse and talk to me.  No complaints. Depressed. Looking for placement.    -Data reviewed today: I reviewed all new labs and imaging results over the last 24 hours. I personally reviewed no images or EKG's today.    Physical Exam   Temp: 96.6  F (35.9  C) Temp src: Oral BP: 100/54  Pulse: 67 Heart Rate: 85 Resp: 16 SpO2: 96 % O2 Device: None (Room air)    Vitals:    03/29/17 0500 03/30/17 0550 04/01/17 0646   Weight: 78.4 kg (172 lb 13.5 oz) 81.2 kg (179 lb 1.6 oz) 76.8 kg (169 lb 5 oz)     Vital Signs with Ranges  Temp:  [96.6  F (35.9  C)-97.8  F (36.6  C)] 96.6  F (35.9  C)  Pulse:  [57-67] 67  Heart Rate:  [77-85] 85  Resp:  [16] 16  BP: (100-129)/(52-67) 100/54  SpO2:  [92 %-97 %] 96 %  I/O last 3 completed shifts:  In: 480 [P.O.:480]  Out: 1115 [Urine:1115]    Constitutional: Sleeping but arouses, cooperative, no apparent distress, flat affect  Respiratory: Clear to auscultation bilaterally, no crackles or wheezing  Cardiovascular: Regular rate and rhythm, normal S1 and S2, and no murmur noted  GI: Normal bowel sounds, soft, non-distended, non-tender  Skin/Integumen: No rashes, no cyanosis, 1+ leg edema bilaterally  Other:     Medications        insulin glargine  13 Units Subcutaneous At Bedtime     insulin aspart  4 Units Subcutaneous TID w/meals     insulin aspart  1-10 Units Subcutaneous TID AC     insulin aspart  1-7 Units Subcutaneous At Bedtime     tamsulosin  0.4 mg Oral Daily     buPROPion  100 mg Oral BID w/meals     mirtazapine  7.5 mg Oral At Bedtime     acetaminophen (TYLENOL) tablet 1,000 mg  1,000 mg Oral TID     amylase-lipase-protease  2 capsule Oral TID w/meals     omeprazole (priLOSEC) CR capsule 20 mg  20 mg Oral QAM     rivaroxaban ANTICOAGULANT  20 mg Oral QPM     senna-docusate  2 tablet Oral TID     sodium chloride (PF)  3 mL Intracatheter Q8H       Data     Recent Labs  Lab 04/01/17  0708 03/26/17  0717 03/25/17  1745   WBC  --   --  11.6*   HGB  --   --  13.0*   MCV  --   --  87   PLT  --   --  276   NA  --  140 139   POTASSIUM  --  4.3 4.6   CHLORIDE  --  105 102   CO2  --  28 28   BUN  --  25 34*   CR 0.66 0.78 1.06   ANIONGAP  --  7 9   EMELIA  --  9.1 9.6   GLC  --  273* 246*   ALBUMIN  --   --  3.1*   PROTTOTAL  --   --  6.8   BILITOTAL  --   --  0.3   ALKPHOS   --   --  60   ALT  --   --  20   AST  --   --  11   TROPI  --   --  0.018       No results found for this or any previous visit (from the past 24 hour(s)).[BJ1.1]       Revision History        User Key Date/Time User Provider Type Action    > BJ1.1 4/1/2017  3:48 PM Korey Andrews MD Physician Sign            Progress Notes by Barbara Watson at 4/1/2017 12:31 PM     Author:  Barbara Watson Service:  (none) Author Type:      Filed:  4/1/2017  3:09 PM Date of Service:  4/1/2017 12:31 PM Note Created:  4/1/2017 12:31 PM    Status:  Addendum :  Barbara Watson ()         SWS  D:  SWS following pt for LTC placement.    SW received the following updates from facilities pt info was sent to for review:  [facility]-no appropriate bed for pt  [facility]- admissions staff requests SW  Call back later today  [facility] in [place]- accepting no more admissions this weekend  [facility]- no weekend admissions  [facility]- will assess and review with nursing staff  [facility]- SW left message requesting update  [facility]- still assessing  P:  SW will continue to follow pt for LTC placement.  SW may need to get more choices from daughter.[LL1.1]    Update:  SW left message for [facility] admissions to see if admission determination made on pt yet, awaiting call back.  Pt declined at [facility] as they have no open bed in LTC for pt, could check back next week.[LL1.2]     Revision History        User Key Date/Time User Provider Type Action    > LL1.2 4/1/2017  3:09 PM Barbara Watson  Addend     LL1.1 4/1/2017 12:36 PM Barbara Watson  Sign            Progress Notes by Karely Mai at 4/1/2017 11:50 AM     Author:  Karely Mai Service:  Spiritual Health Author Type:      Filed:  4/1/2017 11:51 AM Date of Service:  4/1/2017 11:50 AM Note Created:  4/1/2017 11:50 AM    Status:  Signed :  Karely Mai ()         SPIRITUAL HEALTH SERVICES  Progress  Note  [facility]    Stopped by to see if pt was up for a visit.  He was lying in bed and had little to no response to me.  I did offer a prayer for him and let him know that SH would continue to visit with him.        Karely Mai  Chaplain Resident  Pager [phone][HM1.1]     Revision History        User Key Date/Time User Provider Type Action    > HM1.1 4/1/2017 11:51 AM Karely Mai Sign                  Procedure Notes     No notes of this type exist for this encounter.      Progress Notes - Therapies (Notes from 04/01/17 through 04/04/17)     No notes of this type exist for this encounter.

## 2017-03-25 NOTE — IP AVS SNAPSHOT
` ` Patient Information     Patient Name Sex     Rosemary, Reginald CAYDEN ([id number]) Male 1940       Room Bed    [facility] 8805-02      Patient Demographics     Address Phone    [address]  [place] [address] [phone] (Home)      Patient Ethnicity & Race     Ethnic Group Patient Race    American White      Emergency Contact(s)     Name Relation Home Work Mobile    Svitlana Isaac Daughter [phone]    Humza riley Brother [phone]      Documents on File        Status Date Received Description       Documents for the Patient    Affiliate Privacy placeholder   phase3    Consent for EHR Access Received 17     Insurance Card       External Medication Information Consent       Patient ID       [facility] Specified Other       Consent for Services/Privacy Notice - Hospital/Clinic Received 17     Privacy Notice - [place] Received 17        Documents for the Encounter    CMS IM for Patient Signature       Observation Notice Received 17     Observation Notice Received 17  moon with dtr on phone    EMS/Ambulance Record  17 [facility]-EMS    CE Point of Care Auth Received        Admission Information     Attending Provider Admitting Provider Admission Type Admission Date/Time    Sara Abdullahi MD Maresh, Andrew Bruce,  Emergency 17  1736    Discharge Date Hospital Service Auth/Cert Status Service Area     Hospitalist [facility] SERVICES    Unit Room/Bed Admission Status       [address] 8805/88- Admission (Confirmed)       Admission     Complaint    Failure to thrive (0-17), Failure to thrive (0-17)      Hospital Account     Name Acct ID Class Status Primary Coverage    Reginald Riley [id number] Observation [facility]            Guarantor Account (for Hospital Account #[id number])     Name Relation to Pt Service Area Active? Acct Type     Reginald Riley Self FCS Yes Personal/Family    Address Phone          [address]  [place] [address] [phone](H)              Coverage Information (for Hospital Account #[id number])     F/O Payor/Plan Precert #    [employer]ERS CLASSIC [facility]     Subscriber Subscriber #    Reginald Riley [id number]    Address Phone    [address]  [place] [address] [phone]

## 2017-03-25 NOTE — IP AVS SNAPSHOT
"    [place] [address] ONCOLOGY: [phone]                                              INTERAGENCY TRANSFER FORM - LAB / IMAGING / EKG / EMG RESULTS   3/25/2017                    Hospital Admission Date: 3/25/2017  LIANE REIS   : 1940  Sex: Male        Attending Provider: Sara Abdullahi MD     Allergies:  Compazine [Prochlorperazine], Hmg-coa-r Inhibitors, Isopropyl Alcohol    Infection:  None   Service:  HOSPITALIST    Ht:  1.74 m (5' 8.5\")   Wt:  76.5 kg (168 lb 10.4 oz)   Admission Wt:  74 kg (163 lb 2.3 oz)    BMI:  25.27 kg/m 2   BSA:  1.92 m 2            Patient PCP Information     Provider PCP Type    None General         Lab Results - 3 Days      Glucose by meter [[id number]] (Abnormal)  Resulted: 17, Result status: Final result    Ordering provider: Georges Thayer,   17 Resulting lab: POINT OF CARE TEST, GLUCOSE    Specimen Information    Type Source Collected On     17          Components       Value Reference Range Flag Lab   Glucose 179 70 - 99 mg/dL H 170            Creatinine [[id number]]  Resulted: 17 0710, Result status: Final result    Ordering provider: Sara Abdullahi MD  17 0000 Resulting lab: [facility]    Specimen Information    Type Source Collected On   Blood  17 0650          Components       Value Reference Range Flag Lab   Creatinine 0.67 0.66 - 1.25 mg/dL  FrStHsLb   GFR Estimate -- >60 mL/min/1.7m2  FrStHsLb   Result:         >90  Non  GFR Calc     GFR Estimate If Black -- >60 mL/min/1.7m2  FrStHsLb   Result:         >90   GFR Calc              Glucose by meter [[id number]] (Abnormal)  Resulted: 17, Result status: Final result    Ordering provider: Georges Thayer DO  17 Resulting lab: POINT OF CARE TEST, GLUCOSE    Specimen Information    Type Source Collected On     17          Components       Value " Reference Range Flag Lab   Glucose 199 70 - 99 mg/dL H 170            Glucose by meter [[id number]] (Abnormal)  Resulted: 04/03/17 2111, Result status: Final result    Ordering provider: Georges Thayer,   04/03/17 2102 Resulting lab: POINT OF CARE TEST, GLUCOSE    Specimen Information    Type Source Collected On     04/03/17 2102          Components       Value Reference Range Flag Lab   Glucose 184 70 - 99 mg/dL H 170            Glucose by meter [[id number]] (Abnormal)  Resulted: 04/03/17 1811, Result status: Final result    Ordering provider: Georgse Thayer,   04/03/17 1759 Resulting lab: POINT OF CARE TEST, GLUCOSE    Specimen Information    Type Source Collected On     04/03/17 1759          Components       Value Reference Range Flag Lab   Glucose 167 70 - 99 mg/dL H 170            Glucose by meter [[id number]] (Abnormal)  Resulted: 04/03/17 1416, Result status: Final result    Ordering provider: Georges Thayer,   04/03/17 1410 Resulting lab: POINT OF CARE TEST, GLUCOSE    Specimen Information    Type Source Collected On     04/03/17 1410          Components       Value Reference Range Flag Lab   Glucose 226 70 - 99 mg/dL H 170   Comment:  /RN Notified            Glucose by meter [[id number]] (Abnormal)  Resulted: 04/03/17 1226, Result status: Final result    Ordering provider: Georges Thayer,   04/03/17 1205 Resulting lab: POINT OF CARE TEST, GLUCOSE    Specimen Information    Type Source Collected On     04/03/17 1205          Components       Value Reference Range Flag Lab   Glucose 263 70 - 99 mg/dL H 170   Comment:  /RN Notified            Glucose by meter [[id number]] (Abnormal)  Resulted: 04/03/17 0941, Result status: Final result    Ordering provider: Georges Thayer,   04/03/17 0849 Resulting lab: POINT OF CARE TEST, GLUCOSE    Specimen Information    Type Source Collected On     04/03/17 0849          Components       Value Reference Range Flag Lab   Glucose 177  70 - 99 mg/dL H 170   Comment:  Dr/RN Notified            Glucose by meter [[id number]] (Abnormal)  Resulted: 04/03/17 0256, Result status: Final result    Ordering provider: Georges Thayer,   04/03/17 0234 Resulting lab: POINT OF CARE TEST, GLUCOSE    Specimen Information    Type Source Collected On     04/03/17 0234          Components       Value Reference Range Flag Lab   Glucose 200 70 - 99 mg/dL H 170   Comment:  Dr/RN Notified            Glucose by meter [[id number]] (Abnormal)  Resulted: 04/02/17 2152, Result status: Final result    Ordering provider: Georges Thayer,   04/02/17 2145 Resulting lab: POINT OF CARE TEST, GLUCOSE    Specimen Information    Type Source Collected On     04/02/17 2145          Components       Value Reference Range Flag Lab   Glucose 250 70 - 99 mg/dL H 170            Glucose by meter [[id number]] (Abnormal)  Resulted: 04/02/17 1811, Result status: Final result    Ordering provider: Georges Thayer,   04/02/17 1805 Resulting lab: POINT OF CARE TEST, GLUCOSE    Specimen Information    Type Source Collected On     04/02/17 1805          Components       Value Reference Range Flag Lab   Glucose 229 70 - 99 mg/dL H 170            Glucose by meter [[id number]] (Abnormal)  Resulted: 04/02/17 1256, Result status: Final result    Ordering provider: Georges Thayer,   04/02/17 1249 Resulting lab: POINT OF CARE TEST, GLUCOSE    Specimen Information    Type Source Collected On     04/02/17 1249          Components       Value Reference Range Flag Lab   Glucose 110 70 - 99 mg/dL H 170            Glucose by meter [[id number]]  Resulted: 04/02/17 0920, Result status: Final result    Ordering provider: Georges Thayer,   04/02/17 0907 Resulting lab: POINT OF CARE TEST, GLUCOSE    Specimen Information    Type Source Collected On     04/02/17 0907          Components       Value Reference Range Flag Lab   Glucose 71 70 - 99 mg/dL  170            Glucose by meter  [[id number]]  Resulted: 04/02/17 0231, Result status: Final result    Ordering provider: Georges Thayer,   04/02/17 0212 Resulting lab: POINT OF CARE TEST, GLUCOSE    Specimen Information    Type Source Collected On     04/02/17 0212          Components       Value Reference Range Flag Lab   Glucose 93 70 - 99 mg/dL  170   Comment:  /RN Notified            Glucose by meter [[id number]]  Resulted: 04/02/17 0001, Result status: Final result    Ordering provider: Georges Thayer,   04/01/17 2158 Resulting lab: POINT OF CARE TEST, GLUCOSE    Specimen Information    Type Source Collected On     04/01/17 2158          Components       Value Reference Range Flag Lab   Glucose 87 70 - 99 mg/dL  170            Glucose by meter [[id number]]  Resulted: 04/01/17 1750, Result status: Final result    Ordering provider: Georges Thayer,   04/01/17 1747 Resulting lab: POINT OF CARE TEST, GLUCOSE    Specimen Information    Type Source Collected On     04/01/17 1747          Components       Value Reference Range Flag Lab   Glucose 95 70 - 99 mg/dL  170            Glucose by meter [[id number]] (Abnormal)  Resulted: 04/01/17 1256, Result status: Final result    Ordering provider: Georges Thayer,   04/01/17 1245 Resulting lab: POINT OF CARE TEST, GLUCOSE    Specimen Information    Type Source Collected On     04/01/17 1245          Components       Value Reference Range Flag Lab   Glucose 236 70 - 99 mg/dL H 170            Glucose by meter [[id number]] (Abnormal)  Resulted: 04/01/17 0836, Result status: Final result    Ordering provider: Georges Thayer,   04/01/17 0831 Resulting lab: POINT OF CARE TEST, GLUCOSE    Specimen Information    Type Source Collected On     04/01/17 0831          Components       Value Reference Range Flag Lab   Glucose 203 70 - 99 mg/dL H 170            Creatinine [[id number]]  Resulted: 04/01/17 0731, Result status: Final result    Ordering provider: Sara Abdullahi  MD Kadeem  04/01/17 0000 Resulting lab: [facility]    Specimen Information    Type Source Collected On   Blood  04/01/17 0708          Components       Value Reference Range Flag Lab   Creatinine 0.66 0.66 - 1.25 mg/dL  FrStLb   GFR Estimate -- >60 mL/min/1.7m2  FrStHsLb   Result:         >90  Non  GFR Calc     GFR Estimate If Black -- >60 mL/min/1.7m2  FrStHsLb   Result:         >90   GFR Calc              Glucose by meter [[id number]] (Abnormal)  Resulted: 04/01/17 0245, Result status: Final result    Ordering provider: Georges Thayer DO  04/01/17 0218 Resulting lab: POINT OF CARE TEST, GLUCOSE    Specimen Information    Type Source Collected On     04/01/17 0218          Components       Value Reference Range Flag Lab   Glucose 211 70 - 99 mg/dL H 170            Glucose by meter [[id number]] (Abnormal)  Resulted: 03/31/17 2156, Result status: Final result    Ordering provider: Georges Thayer,   03/31/17 2142 Resulting lab: POINT OF CARE TEST, GLUCOSE    Specimen Information    Type Source Collected On     03/31/17 2142          Components       Value Reference Range Flag Lab   Glucose 331 70 - 99 mg/dL H 170   Comment:  /RN Notified            Testing Performed By     Lab - Abbreviation Name Director Address Valid Date Range    14 - FrStHsLb [facility] Unknown [address] [place] [address] 05/08/15 1057 - Present    170 - Unknown POINT OF CARE TEST, GLUCOSE Unknown Unknown 10/31/11 1114 - Present            Unresulted Labs (24h ago through future)    Start       Ordered    04/01/17 0600  Creatinine  EVERY THREE DAYS,   Routine     Comments:  Last Lab Result: Creatinine (mg/dL)       Date                     Value                 03/26/2017               0.78             ----------    03/31/17 0814      Encounter-Level Documents:     There are no encounter-level documents.      Order-Level Documents:     There are no  order-level documents.

## 2017-03-25 NOTE — IP AVS SNAPSHOT
MRN:[id number]                      After Visit Summary   3/25/2017    Reginald Riley    MRN: [id number]           Thank you!     Thank you for choosing [facility] for your care. Our goal is always to provide you with excellent care. Hearing back from our patients is one way we can continue to improve our services. Please take a few minutes to complete the written survey that you may receive in the mail after you visit with us. Thank you!        Patient Information     Date Of Birth          1940        About your hospital stay     You were admitted on:  March 25, 2017 You last received care in the:  [place] [address] Oncology    You were discharged on:  April 4, 2017        Reason for your hospital stay       Frailty syndrome, physical deconditioning.                  Who to Call     For medical emergencies, please call 911.  For non-urgent questions about your medical care, please call your primary care provider or clinic, None          Attending Provider     Provider Specialty    Danica Bailey MD Emergency Medicine    Flavio, Rachel Saavedra DO Internal Medicine    Sara Abdullahi MD Internal Medicine       Primary Care Provider    None       No address on file        After Care Instructions     Activity - Up with nursing assistance           Advance Diet as Tolerated       Follow this diet upon discharge: Regular            Ballard catheter       To straight gravity drainage. Ballard removal in 1-2 weeks to asses ability to void on his own. If still issue with urinary retention, consider urology referral.  If the patient would like a referral to a Urology provider [facility] referral line is:  Central Appointment #: [phone]  Located at: [address] [place] [address], [address]   [place]. [address]            General info for SNF       Length of Stay Estimate: Long Term Care  Condition at Discharge: Stable  Level of care:skilled   Rehabilitation  Potential: Fair  Admission H&P remains valid and up-to-date: Yes  Recent Chemotherapy: N/A  Use Nursing Home Standing Orders: Yes            Glucose monitor nursing POCT       Before meals and at bedtime            Mantoux instructions       Give two-step Mantoux (PPD) Per Facility Policy Yes                  Follow-up Appointments     Follow Up and recommended labs and tests       Follow up with Nursing home physician.  No follow up labs or test are needed.  You are scheduled for an initial visit with Dr. Safia Apple on Thursday April 5th at 1 p.m. She will be able to give recommendations for a Psychiatrist at that time  Located at:  [facility]  Close  [address] [place] [address]  Phone: [phone]    You are scheduled for a [place] [facility] appointment to discuss Dysmotility medications with Physicians Assistant Martinez on April 12tha t 10:20 a.m. Located at:  [facility]   [address] [place] [address],  S[address]: [address] (Clinic) / [address] (Endoscopy Center)    [address]  Phone: [phone]                  Additional Services     Occupational Therapy Adult Consult       Evaluate and treat as clinically indicated.    Reason:  deconditioning            Physical Therapy Adult Consult       Evaluate and treat as clinically indicated.    Reason:  deconditioning                  Further instructions from your care team       [facility] at [address] [address]  [phone]     Pending Results     No orders found from 3/23/2017 to 3/26/2017.            Statement of Approval     Ordered          04/04/17 0805  I have reviewed and agree with all the recommendations and orders detailed in this document.  EFFECTIVE NOW     Approved and electronically signed by:  Sanket Hilario DO           04/03/17 1404  I have reviewed and agree with all the recommendations and  "orders detailed in this document.  EFFECTIVE NOW     Approved and electronically signed by:  Sulaiman Sanket Silveira            17 4240  I have reviewed and agree with all the recommendations and orders detailed in this document.  EFFECTIVE NOW     Approved and electronically signed by:  Zohra Beal MD             Admission Information     Date & Time Provider Department Dept. Phone    3/25/2017 Sara Abdullahi MD [place] [address] Oncology [phone]      Your Vitals Were     Blood Pressure Pulse Temperature Respirations Height Weight    143/66 (BP Location: Right arm) 67 98.3  F (36.8  C) (Oral) 16 1.74 m (5' 8.5\") 76.5 kg (168 lb 10.4 oz)    Pulse Oximetry BMI (Body Mass Index)                96% 25.27 kg/m2          [employer] Information     [employer] lets you send messages to your doctor, view your test results, renew your prescriptions, schedule appointments and more. To sign up, go to [email or web address] . Click on \"Log in\" on the left side of the screen, which will take you to the Welcome page. Then click on \"Sign up Now\" on the right side of the page.     You will be asked to enter the access code listed below, as well as some personal information. Please follow the directions to create your username and password.     Your access code is: [email or web address]  Expires: 2017 11:07 AM     Your access code will  in 90 days. If you need help or a new code, please call your [facility] clinic or [phone].        Care EveryWhere ID     This is your Care EveryWhere ID. This could be used by other organizations to access your [facility] medical records  [id number]           Review of your medicines      START taking        Dose / Directions    buPROPion 100 MG 12 hr tablet   Commonly known as:  WELLBUTRIN SR   Used for:  Depression, unspecified depression type        Dose:  100 mg   Take 1 tablet (100 mg) by mouth 2 times daily (with meals)   Quantity:  60 tablet   Refills:  0 "       mirtazapine 7.5 MG Tabs tablet   Commonly known as:  REMERON   Used for:  Depression, unspecified depression type        Dose:  7.5 mg   Take 1 tablet (7.5 mg) by mouth At Bedtime   Quantity:  30 tablet   Refills:  0       tamsulosin 0.4 MG capsule   Commonly known as:  FLOMAX   Used for:  Urinary retention        Dose:  0.4 mg   Take 1 capsule (0.4 mg) by mouth daily   Quantity:  30 capsule   Refills:  0         CONTINUE these medicines which may have CHANGED, or have new prescriptions. If we are uncertain of the size of tablets/capsules you have at home, strength may be listed as something that might have changed.        Dose / Directions    insulin lispro 100 UNIT/ML injection   Commonly known as:  HumaLOG KWAmy   This may have changed:    - how much to take  - how to take this  - when to take this  - additional instructions   Used for:  Type 2 diabetes mellitus without complication, with long-term current use of insulin (H)        1 unit per carb unit with breakfast, lunch and dinner (wait to see how much he eats and then give appropriate coverage)   Refills:  0         CONTINUE these medicines which have NOT CHANGED        Dose / Directions    ACETAMINOPHEN PO        Dose:  1000 mg   Take 1,000 mg by mouth 3 times daily For shoulder pain.   Refills:  0       * amylase-lipase-protease 25951 UNITS Cpep   Commonly known as:  CREON 12        Dose:  1 capsule   Take 1 capsule by mouth Take with snacks or supplements Give at 20:00   Refills:  0       * amylase-lipase-protease 03585 UNITS Cpep   Commonly known as:  CREON 12        Dose:  2 capsule   Take 2 capsules by mouth 3 times daily (with meals) Take at 08:00, 12:00, 17:00   Refills:  0       bisacodyl 10 MG Suppository   Commonly known as:  DULCOLAX        Dose:  10 mg   Place 10 mg rectally daily as needed for constipation   Refills:  0       COMPAZINE PO        Dose:  5 mg   Take 5 mg by mouth every 6 hours as needed for nausea   Refills:  0        insulin glargine 100 UNIT/ML injection   Commonly known as:  LANTUS        Dose:  10 Units   Inject 10 Units Subcutaneous At Bedtime   Refills:  0       polyethylene glycol Packet   Commonly known as:  MIRALAX/GLYCOLAX        Dose:  17 g   Take 17 g by mouth daily as needed for constipation   Refills:  0       PRILOSEC PO        Dose:  20 mg   Take 20 mg by mouth every morning   Refills:  0       RIVAROXABAN PO        Dose:  20 mg   Take 20 mg by mouth daily   Refills:  0       senna-docusate 8.6-50 MG per tablet   Commonly known as:  SENOKOT-S;PERICOLACE   Indication:  Constipation        Dose:  2 tablet   Take 2 tablets by mouth 3 times daily   Refills:  0       * Notice:  This list has 2 medication(s) that are the same as other medications prescribed for you. Read the directions carefully, and ask your doctor or other care provider to review them with you.      STOP taking     METFORMIN HCL PO           REGLAN PO                Where to get your medicines      Some of these will need a paper prescription and others can be bought over the counter. Ask your nurse if you have questions.     You don't need a prescription for these medications     buPROPion 100 MG 12 hr tablet    insulin lispro 100 UNIT/ML injection    mirtazapine 7.5 MG Tabs tablet    tamsulosin 0.4 MG capsule                Protect others around you: Learn how to safely use, store and throw away your medicines at www.disposemymeds.org.             Medication List: This is a list of all your medications and when to take them. Check marks below indicate your daily home schedule. Keep this list as a reference.      Medications           Morning Afternoon Evening Bedtime As Needed    ACETAMINOPHEN PO   Take 1,000 mg by mouth 3 times daily For shoulder pain.   Last time this was given:  1,000 mg on 4/4/2017  9:05 AM                                * amylase-lipase-protease 60177 UNITS Cpep   Commonly known as:  CREON 12   Take 1 capsule by mouth Take with  snacks or supplements Give at 20:00   Last time this was given:  24,000 Units on 4/4/2017  9:04 AM                                * amylase-lipase-protease 89961 UNITS Cpep   Commonly known as:  CREON 12   Take 2 capsules by mouth 3 times daily (with meals) Take at 08:00, 12:00, 17:00   Last time this was given:  24,000 Units on 4/4/2017  9:04 AM                                bisacodyl 10 MG Suppository   Commonly known as:  DULCOLAX   Place 10 mg rectally daily as needed for constipation                                buPROPion 100 MG 12 hr tablet   Commonly known as:  WELLBUTRIN SR   Take 1 tablet (100 mg) by mouth 2 times daily (with meals)   Last time this was given:  100 mg on 4/4/2017  9:05 AM                                COMPAZINE PO   Take 5 mg by mouth every 6 hours as needed for nausea                                insulin glargine 100 UNIT/ML injection   Commonly known as:  LANTUS   Inject 10 Units Subcutaneous At Bedtime   Last time this was given:  10 Units on 4/3/2017 10:02 PM                                insulin lispro 100 UNIT/ML injection   Commonly known as:  HumaLOG KWIKpen   1 unit per carb unit with breakfast, lunch and dinner (wait to see how much he eats and then give appropriate coverage)                                mirtazapine 7.5 MG Tabs tablet   Commonly known as:  REMERON   Take 1 tablet (7.5 mg) by mouth At Bedtime   Last time this was given:  7.5 mg on 4/3/2017 10:02 PM                                polyethylene glycol Packet   Commonly known as:  MIRALAX/GLYCOLAX   Take 17 g by mouth daily as needed for constipation                                PRILOSEC PO   Take 20 mg by mouth every morning   Last time this was given:  20 mg on 4/4/2017  9:05 AM                                RIVAROXABAN PO   Take 20 mg by mouth daily   Last time this was given:  20 mg on 4/3/2017  6:18 PM                                senna-docusate 8.6-50 MG per tablet   Commonly known as:   SENOKOT-S;PERICOLACE   Take 2 tablets by mouth 3 times daily   Last time this was given:  2 tablets on 4/4/2017  9:05 AM                                tamsulosin 0.4 MG capsule   Commonly known as:  FLOMAX   Take 1 capsule (0.4 mg) by mouth daily   Last time this was given:  0.4 mg on 4/4/2017  9:05 AM                                * Notice:  This list has 2 medication(s) that are the same as other medications prescribed for you. Read the directions carefully, and ask your doctor or other care provider to review them with you.

## 2017-03-25 NOTE — ED PROVIDER NOTES
"  History     Chief Complaint:  Failure to Thrive    HPI   History is limited due to patient's altered mental status:    Reignald Riley is a 76 year old male with a history of A-fib, Parkinson's disease, multiple cancers including amullary CA, and diabetes who presents with failure to thrive. Per EMS, the patient comes from his nursing home, [facility], where he normally is alert, oriented, talks, and walks with a walker. Upon review of his chart, the patinet was admitted to the nursing home on 3/13 for parkinsons, diabetes, and severe sepsis. Since then, he has been declining, feeling weak, nauseous, refusing to eat and drink, and has been becoming less responsive by not appearing willing to talk more than yes/no questions or nodding. Today, they state that he is not verbally responsive and they became concerned about his decline. When EMS responded, his blood sugar was 280 and his blood pressure was 84 systolic, and they brought him to the ED for evaluation. While in the ED, the patient responds slowly to questions. When asked how he was feeling, he replied, \"under the weather\" for \"too long.\" He also reports feeling generally week \"for a while\" with decreased intake of food and fluids. He denies pain, recent fever, cough, abdominal pain.    When the patient's family arrived, they state that he was treated at [facility] 6 weeks ago for DKA presumed to be due to pneumonia and since that time has been declining and not wanting to eat or drink. The daughter also states he recently told her \"that he's giving up and no longer can see the light.\"        Allergies:  Compazine  Statins  Hmg-Coa-R Inhibitors  Isopropyl Alcohol     Medications:  As of 6 weeks ago, 2/20/17  Creon Capsule  Metformin  Basal Insulin  Reglan  Omeprazole  Miralax  Zofran PRN  Senna-Docucate     Past Medical History:    A-fib  Diabetes  GERD  Hyperlipidemia  Parkinson disease  Pneumonia  Severe sepsis   Ampullary cancer  Prostate " cancer  AML  Melanoma    Past Surgical History:    Whipple Surgery     Family History:  History reviewed.  No significant family history.     Social History:  Relationship status:   The patient formerly smoked (Quit date 3/25/1986).   The patient does not drink alcohol use.   The patient presents with his family members.     Review of Systems   Constitutional: Positive for appetite change (decreased). Negative for fever.   Respiratory: Negative for cough.    Gastrointestinal: Positive for nausea. Negative for abdominal pain.   Neurological: Positive for weakness (generalized ).   All other systems reviewed and are negative.      Physical Exam     Patient Vitals for the past 24 hrs:   BP Temp Temp src Pulse Resp SpO2   03/25/17 1744 110/60 97.7  F (36.5  C) Oral 70 (!) 40 97 %            Physical Exam  General/Appearance: appears stated age, well-groomed, appears comfortable but very weak, answers questions appropriately but takes a long time to answer and whispers answers, follows commands  Eyes: EOMI, no scleral injection, no icterus  ENT: dry oral mucosa  Neck: supple, nl ROM, no stiffness  Cardiovascular: irregularly irregular, nl S1S2, no m/r/g, 2+ pulses in all 4 extremities, cap refill <2sec  Respiratory: CTAB, good air movement throughout, no wheezes/rhonchi/rales, no increased WOB, no retractions  GI: abd soft and scaphoid, non-distended, nttp,  no HSM, no rebound, no guarding, nl BS  MSK: SAVAGE, good tone, no bony abnormality  Skin: warm and well-perfused, no rash, no edema, no ecchymosis, nl turgor  Neuro: GCS 15, alert and oriented, no gross focal neuro deficits  Psych: flat affect  Heme: no petechia, no purpura, no active bleeding      Emergency Department Course     ECG @ 1758  Indication: Altered mental status   Rate 78 bpm.   AZ interval * ms.   QRS duration 78 ms.   QT/QTc 354/403 ms.   P-R-T axes 1.  Notes: Atrial fibrillation  Time read 1802    Imaging:  Radiographic findings were communicated  with the patient who voiced understanding of the findings.    Chest XR per radiology:   IMPRESSION: Questionable small pleural effusions seen only on the  lateral. Heart size and pulmonary vasculature are normal. No  infiltrates. Degenerative changes noted in right shoulder.    Laboratory:  1744: Glucose by Meter: 208 (H)  UA: Clear, yellow urine;  (A) Bilin Small (A) Albumin 30 (A) Ketons 40 (A) Rest WNL  Urine Microscopic: WBC 0-2 RBC 0-2 Hyaline Casts 2-5 (A)  Urine Culture: Pending  CBC: WBC 11.6 (H) HGB 13.0 (L)  (WNL) HCT 39.6 (L) Absolute Neutrophil 8.5 (H)  CMP: Cr 1.06 (WNL) Glucose 246 (H) BUN 34 (H) Albumin 3.1 (H) Rest WNL  1745: Troponin I: 0.018 (WNL)  Blood culture 1: Pending  Blood culture 2: Pending  ISTAT gases lactate gilberto POCT: pH 7.42 (WNL) pCO2 48 (WNL) p02 45 (WNL) Bicarbonate 31 (H) Lactate 1.7 (WNL) Rest WNL    Interventions:  1801: Normal Saline, 1000 mL, IV   1943: Normal Saline, 1000 mL, IV     ED Course:  The patient arrived by ambulance. He was escorted to the ED where his vitals were measured and recorded.   Nursing notes and past medical history reviewed.   I performed a physical examination of the patient as documented above.  I explained the plan with the patient who consents to this.   An ECG was obtained.   Ballard catheter in place but no urine output was noted.   Blood was drawn and sent to the laboratory for testing, see above results.   A peripheral IV was established.   The patient received the above interventions.   The patient underwent the above imaging studies.   I personally reviewed the laboratory and imaging results with the Patient and answered all related questions prior to admitting.  Findings and plan explained to the Patient who consents to admission. Discussed the patient with Dr. Muniz, who will admit the patient to a medical bed for further monitoring, evaluation, and treatment.     Impression & Plan      Medical Decision Making:  This patient is a  "76 year old male who presents with failure to thrive. He was admitted to a TCU/nursing home a couple weeks ago after a bout in the hospital with DKA secondary to presumed sepsis. Over this time, he has declining health that I suspect is due to underlying depression. He has had decreased activity, refusing to take his meds, drink, eat. Today, he progressed to limited verbal interaction that I suspect is a \"catatonia\" secondary to depression. Although they initially reported that he was altered on exam, he does not seem to be this way. He is verbal and appropriate. He just has long delays before speaking and speaks very quietly. He agrees that his mood is very low and that he thinks he is depressed. I considered things such as sepsis or cardiac dysrhythmia as his etiology. That being said, so far his labs as well as his chest x-ray were negative. We put in a garcia catheter to try to measure ins and outs as well as obtain a sample. However he has not made any urine yet despite 1.5 L IV fluids. He clinically very dehydrated and I again suspect this is due to his significantly decreased PO intake recently. Overall, I feel he would benefit from coming into the hospital for IV fluids as well as psychiatric consultation. The patient feels comfortable with that plan.     Diagnosis:    ICD-10-CM    1. Adult failure to thrive R62.7    2. Dehydration E86.0    3. Depression, unspecified depression type F32.9          Disposition:   Admit to a [facility] bed under the care of Dr. Muniz.         I, Addie Greene, am serving as a scribe on 3/25/2017 at 5:41 PM to personally document services performed by Danica Bailey MD, based on my observations and the provider's statements to me.       Danica Bailey MD  03/26/17 0100    "

## 2017-03-25 NOTE — IP AVS SNAPSHOT
"` `           [place] [address] ONCOLOGY: [phone]                                              INTERAGENCY TRANSFER FORM - NURSING   3/25/2017                    Hospital Admission Date: 3/25/2017  LIANE REIS   : 1940  Sex: Male        Attending Provider: Sara Abdullahi MD     Allergies:  Compazine [Prochlorperazine], Hmg-coa-r Inhibitors, Isopropyl Alcohol    Infection:  None   Service:  HOSPITALIST    Ht:  1.74 m (5' 8.5\")   Wt:  76.5 kg (168 lb 10.4 oz)   Admission Wt:  74 kg (163 lb 2.3 oz)    BMI:  25.27 kg/m 2   BSA:  1.92 m 2            Patient PCP Information     Provider PCP Type    None General      Current Code Status     Date Active Code Status Order ID Comments User Context       Prior      Code Status History     Date Active Date Inactive Code Status Order ID Comments User Context    3/29/2017  9:31 AM  Full Code [id number]  Zohra Beal MD Outpatient    3/25/2017  9:17 PM 3/29/2017  9:31 AM Full Code [id number]  Isis Smart PA-C Inpatient      Advance Directives        Does patient have a scanned Advance Directive/ACP document in EPIC?           No        Hospital Problems as of 2017              Priority Class Noted POA    Failure to thrive (0-17) Medium  3/25/2017 Yes    Parkinsonism (H) Medium  3/29/2017 Yes    Depression Medium  3/29/2017 Yes    Failure to thrive in adult Medium  3/29/2017 Yes      Non-Hospital Problems as of 2017     None      Immunizations     None         END      ASSESSMENT     Discharge Profile Flowsheet     EXPECTED DISCHARGE     Patient's communication style  spoken language (English or Bilingual) 17 1743    Expected Discharge Date  17 1509   FINAL RESOURCES      DISCHARGE NEEDS ASSESSMENT     Resources List  Skilled Nursing Facility 17 1509    Patient/family verbalizes understanding of discharge plan recommendations?  Yes 17 1509   Other Resources  Transportation Services 17 " "1509    Anticipated Changes Related to Illness  none 03/25/17 2153   Transportation [facility] 04/03/17 1509    Equipment Currently Used at Home  none 03/30/17 1421   Transportation Contact Info  [phone] 04/03/17 1509    # of Referrals Placed by CTS  External Care Coordination;Scheduled Follow-up appointments;Communication hand-offs to next level of Care Providers 03/29/17 1141   Skilled Nursing Facility  [facility]) [phone], Fax: [phone] 04/03/17 1509    GASTROINTESTINAL (ADULT,PEDIATRIC,OB)     SKIN      GI WDL  ex 04/04/17 0808   Inspection  Full 04/04/17 0808    Abdominal Appearance  concave 04/04/17 0346   Skin areas NOT inspected  Ear, left;Ear, right;Scapula, left;Scapula, right;Elbow, left;Spine;Elbow, right;Hip, left;Hip, right;Buttock, left;Buttock, right;Sacrum;Coccyx;Heel, left;Heel, right 03/31/17 0217    All Quadrants Bowel Sounds  audible and active in all quadrants 04/04/17 0835   Skin WDL  WDL 04/04/17 0808    Last Bowel Movement  04/03/17 04/03/17 2008   Skin Color/Characteristics  pale;redness blanchable (coccyx) 04/03/17 2008    GI Signs/Symptoms  fecal incontinence (at times) 04/04/17 0808   Skin Integrity  drain/device(s) 04/03/17 2008    Passing flatus  yes 04/04/17 0835   SAFETY      COMMUNICATION ASSESSMENT     Safety WDL  WDL 04/04/17 0808                 Assessment WDL (Within Defined Limits) Definitions           Safety WDL     Effective: 09/28/15    Row Information: <b>WDL Definition:</b> Bed in low position, wheels locked; call light in reach; upper side rails up x 2; ID band on<br> <font color=\"gray\"><i>Item=AS safety wdl>>List=AS safety wdl>>Version=F14</i></font>      Skin WDL     Effective: 09/28/15    Row Information: <b>WDL Definition:</b> Warm; dry; intact; elastic; without discoloration; pressure points without redness<br> <font color=\"gray\"><i>Item=AS skin wdl>>List=AS skin wdl>>Version=F14</i></font>    " "  Vitals     Vital Signs Flowsheet     VITAL SIGNS     Side Effects Monitoring: Respiratory Depth  N 04/04/17 0334    Temp  98.3  F (36.8  C) 04/04/17 0928   Side Effects Monitoring: Sedation Level  1 04/04/17 0334    Temp src  Oral 04/04/17 0928   HEIGHT AND WEIGHT      Resp  16 04/04/17 0928   Height  1.74 m (5' 8.5\") 03/27/17 1332    Pulse  67 04/04/17 0334   Weight  76.5 kg (168 lb 10.4 oz) (Bed weight) 04/04/17 0632    Heart Rate  90 04/04/17 0928   EKG MONITORING      Pulse/Heart Rate Source  Monitor 04/03/17 1949   Cardiac Regularity  Irregular 03/25/17 2046    BP  143/66 04/04/17 0928   Cardiac Rhythm  Atrial fibrillation 03/25/17 2046    BP Location  Right arm 04/04/17 0928   CHARITO COMA SCALE      OXYGEN THERAPY     Best Eye Response  4-->(E4) spontaneous 03/29/17 0037    SpO2  96 % 04/04/17 0928   Best Motor Response  6-->(M6) obeys commands 03/29/17 0037    O2 Device  None (Room air) 04/04/17 0928   Best Verbal Response  5-->(V5) oriented 03/29/17 0037    PAIN/COMFORT     Ethel Coma Scale Score  15 03/29/17 0037    Patient Currently in Pain  denies 04/04/17 0928   POSITIONING      Preferred Pain Scale  word (verbal rating pain scale) 04/04/17 0334   Body Position  supine 04/04/17 0835    0-10 Pain Scale  8 04/03/17 1219   Head of Bed (HOB)  HOB at 30 degrees 04/04/17 0808    Word Pain Scale  4 04/04/17 0334   Positioning/Transfer Devices  pillows;in use 04/03/17 1405    Pain Location  Penis 04/04/17 0334   Chair  Upright in chair 04/03/17 1053    Pain Descriptors  Sharp 04/04/17 0334   DAILY CARE      Pain Intervention(s)  Repositioned (catheter repositioned) 04/04/17 0334   Activity Type  activity adjusted per tolerance;activity encouraged 04/04/17 0835    Response to Interventions  Relief 04/04/17 0334   Activity Level of Assistance  assistance, 1 person 04/04/17 0835    ANALGESIA SIDE EFFECTS MONITORING     Activity Assistive Device  gait belt;walker 04/04/17 1802    Side Effects Monitoring: " Respiratory Quality  R 04/04/17 0334                 Patient Lines/Drains/Airways Status    Active LINES/DRAINS/AIRWAYS     Name: Placement date: Placement time: Site: Days: Last dressing change:    Urethral Catheter Coude 16 fr 03/28/17   1820   Coude   6             Patient Lines/Drains/Airways Status    Active PICC/CVC     None            Intake/Output Detail Report     Date Intake     Output Net    Shift P.O. I.V. IV Piggyback Total Urine Total       Noc 04/02/17 2300 - 04/03/17 0659 -- -- -- -- 550 550 -550    Day 04/03/17 0700 - 04/03/17 1459 -- -- -- -- 200 200 -200    Marisol 04/03/17 1500 - 04/03/17 2259 400 -- -- 400 350 350 50    Noc 04/03/17 2300 - 04/04/17 0659 -- -- -- -- 400 400 -400    Day 04/04/17 0700 - 04/04/17 1459 -- -- -- -- -- -- 0      Last Void/BM       Most Recent Value    Urine Occurrence 0 at 03/28/2017 0253    Stool Occurrence 1 at 04/03/2017 0930      Case Management/Discharge Planning     Case Management/Discharge Planning Flowsheet     REFERRAL INFORMATION     COPING/STRESS      Did the Initial Social Work Assessment result in a Social Work Case?  Yes 03/26/17 1153   Major Change/Loss/Stressor  none 03/25/17 2155    Admission Type  inpatient 03/29/17 1141   EXPECTED DISCHARGE      Arrived From  home or self-care 03/29/17 1141   Expected Discharge Date  04/04/17 04/03/17 1509    Referral Source  interdisciplinary rounds 03/29/17 1141   DISCHARGE PLANNING      New Steerage to  Clinics?  No 03/29/17 1141   Patient/family verbalizes understanding of discharge plan recommendations?  Yes 04/03/17 1509    # of Referrals Placed by CTS  External Care Coordination;Scheduled Follow-up appointments;Communication hand-offs to next level of Care Providers 03/29/17 1141   Anticipated Changes Related to Illness  none 03/25/17 2153    Reason For Consult  discharge planning 03/29/17 1141   FINAL RESOURCES      Record Reviewed  plan of care 03/29/17 1141   Equipment Currently Used at Home  none 03/30/17  1421     Assigned to Case  Martha Garrison MSW 04/03/17 1509   Resources List  Skilled Nursing Facility 04/03/17 1509    Primary Care Clinic Name  [facility] 03/29/17 1141   Other Resources  Transportation Services 04/03/17 1509    LIVING ENVIRONMENT     Transportation Agency  [facility] 04/03/17 1509    Lives With  facility resident 03/30/17 1421   Transportation Contact Info  [phone] 04/03/17 1509    Living Arrangements  residential facility 03/30/17 1421   Skilled Nursing Facility  [facility]) [phone], Fax: [phone] 04/03/17 1509    Provides Primary Care For  no one, unable/limited ability to care for self 03/26/17 1153   / CAREGIVER      Primary Care Provided By  other (see comments) 03/26/17 1153   Filed Complexity Screen Score  4 03/29/17 1501    Quality Of Family Relationships  supportive;involved 03/26/17 1153   ABUSE RISK SCREEN      Able to Return to Prior Living Arrangements  yes 03/26/17 1153   QUESTION TO PATIENT:  Has a member of your family or a partner(now or in the past) intimidated, hurt, manipulated, or controlled you in any way?  patient declined to answer or is unable to answer 03/25/17 1748    HOME SAFETY     QUESTION TO PATIENT: Do you feel safe going back to the place where you are living?  patient declined to answer or is unable to answer 03/25/17 1748    Patient Feels Safe Living in Home?  yes 03/26/17 1153   OBSERVATION: Is there reason to believe there has been maltreatment of a vulnerable adult (ie. Physical/Sexual/Emotional abuse, self neglect, lack of adequate food, shelter, medical care, or financial exploitation)?  no 03/25/17 2153    ASSESSMENT OF FAMILY/SOCIAL SUPPORT     HOMICIDE RISK      Who is your support system?  Children 03/26/17 1153   Homicidal Ideation  no 03/25/17 1748    Description of Support System  Supportive;Involved 03/26/17 1153   (R) MENTAL HEALTH SUICIDE RISK      Support Assessment  Adequate  family and caregiver support;Adequate social supports 03/26/17 1151   Are you depressed or being treated for depression?  Yes 03/31/17 1624    Quality of Family Relationships  supportive;involved 03/26/17 3998

## 2017-03-25 NOTE — IP AVS SNAPSHOT
` [place] [address] ONCOLOGY: [phone]            Medication Administration Report for Reginald Riley as of 04/04/17 0949   Legend:    Given Hold Not Given Due Canceled Entry Other Actions    Time Time (Time) Time  Time-Action       Inactive    Active    Linked        Medications 03/29/17 03/30/17 03/31/17 04/01/17 04/02/17 04/03/17 04/04/17    acetaminophen (TYLENOL) Suppository 650 mg  Dose: 650 mg Freq: EVERY 4 HOURS PRN Route: RE  PRN Reason: mild pain  Start: 03/25/17 2117   Admin Instructions: Alternate ibuprofen (if ordered) with acetaminophen.  Maximum acetaminophen dose from all sources = 75 mg/kg/day not to exceed 4 grams/day.               acetaminophen (TYLENOL) tablet 1,000 mg  Dose: 1,000 mg Freq: 3 TIMES DAILY Route: PO  Start: 03/25/17 2200   Admin Instructions: Maximum acetaminophen dose from all sources = 75 mg/kg/day not to exceed 4 gram     (0900)-Not Given       1751 (1,000 mg)-Given       (2154)-Not Given        0928 (1,000 mg)-Given       1644 (1,000 mg)-Given       2103 (1,000 mg)-Given        0859 (1,000 mg)-Given       1758 (1,000 mg)-Given       2139 (1,000 mg)-Given        0855 (1,000 mg)-Given       1608 (1,000 mg)-Given       2252 (1,000 mg)-Given        0927 (1,000 mg)-Given       1810 (1,000 mg)-Given [C]       2218 (1,000 mg)-Given        0913 (1,000 mg)-Given       1625 (1,000 mg)-Given       2201 (1,000 mg)-Given        0905 (1,000 mg)-Given       [ ] 1600       [ ] 2200           acetaminophen (TYLENOL) tablet 650 mg  Dose: 650 mg Freq: EVERY 4 HOURS PRN Route: PO  PRN Reason: mild pain  Start: 03/25/17 2117   Admin Instructions: Alternate ibuprofen (if ordered) with acetaminophen.  Maximum acetaminophen dose from all sources = 75 mg/kg/day not to exceed 4 grams/day.               amylase-lipase-protease (CREON 12) 93355 UNITS per capsule 12,000 Units  Dose: 1 capsule Freq: WITH SNACKS OR SUPPLEMENTS Route: PO  PRN Comment: with snacks or supplements  Start:  03/25/17 2117   Admin Instructions: DO NOT CRUSH. To be swallowed as whole; Not to be crushed, chewed or retained in mouth. For patients who are unable to swallow intact capsule, the contents may be sprinkled on soft acidic food.                 amylase-lipase-protease (CREON 12) 50768 UNITS per capsule 24,000 Units  Dose: 2 capsule Freq: 3 TIMES DAILY WITH MEALS Route: PO  Start: 03/26/17 0900   Admin Instructions: DO NOT CRUSH. To be swallowed as whole; Not to be crushed, chewed or retained in mouth. For patients who are unable to swallow intact capsule, the contents may be sprinkled on soft acidic food.       0859 (24,000 Units)-Given       (1300)-Not Given [C]       1753 (24,000 Units)-Given        0928 (24,000 Units)-Given       1307 (24,000 Units)-Given       1746 (24,000 Units)-Given        0900 (24,000 Units)-Given       1228 (24,000 Units)-Given       1758 (24,000 Units)-Given        0854 (24,000 Units)-Given       1324 (24,000 Units)-Given       1752 (24,000 Units)-Given        0927 (24,000 Units)-Given       1341 (24,000 Units)-Given       1810 (24,000 Units)-Given        0913 (24,000 Units)-Given       1229 (24,000 Units)-Given       1817 (24,000 Units)-Given        0904 (24,000 Units)-Given       [ ] 1200       [ ] 1800           buPROPion (WELLBUTRIN SR) 12 hr tablet 100 mg  Dose: 100 mg Freq: 2 TIMES DAILY WITH MEALS Route: PO  Start: 03/26/17 0915   Admin Instructions: DO NOT CRUSH.     0900 (100 mg)-Given       1752 (100 mg)-Given        0928 (100 mg)-Given       1746 (100 mg)-Given        0900 (100 mg)-Given       1758 (100 mg)-Given        0855 (100 mg)-Given       1752 (100 mg)-Given        0927 (100 mg)-Given       1810 (100 mg)-Given        0913 (100 mg)-Given       1818 (100 mg)-Given        0905 (100 mg)-Given       [ ] 1800           glucose 40 % gel 15-30 g  Dose: 15-30 g Freq: EVERY 15 MIN PRN Route: PO  PRN Reason: low blood sugar  Start: 03/25/17 2048   Admin Instructions: Give 15 g for  BG 51 to 69 mg/dL IF patient is conscious and able to swallow. Give 30 g for BG less than or equal to 50 mg/dL IF patient is conscious and able to swallow. Do NOT give glucose gel via enteral tube.  IF patient has enteral tube: give apple juice 120 mL (4 oz or 15 g of CHO) via enteral tube for BG 51 to 69 mg/dL.  Give apple juice 240 mL (8 oz or 30 g of CHO) via enteral tube for BG less than or equal to 50 mg/dL.              Or  dextrose 50 % injection 25-50 mL  Dose: 25-50 mL Freq: EVERY 15 MIN PRN Route: IV  PRN Reason: low blood sugar  Start: 03/25/17 2048   Admin Instructions: Use if have IV access, BG less than 70 mg/dL and meet dose criteria below:  Dose if conscious and alert (or disorientated) and NPO = 25 mL  Dose if unconscious / not alert = 50 mL  Vesicant.              Or  glucagon injection 1 mg  Dose: 1 mg Freq: EVERY 15 MIN PRN Route: SC  PRN Reason: low blood sugar  PRN Comment: May repeat x 1 only  Start: 03/25/17 2048   Admin Instructions: May give SQ or IM. IF BG less than or equal to 50 mg/dL and no IV access.  ONLY use glucagon IF patient has NO IV access AND is UNABLE to swallow.               hydrALAZINE (APRESOLINE) half-tab 12.5 mg  Dose: 12.5 mg Freq: 4 TIMES DAILY PRN Route: PO  PRN Comment: for SBP >180  Start: 03/28/17 1231              insulin aspart (NovoLOG) inj (RAPID ACTING)  Dose: 1-16 Units Freq: AT BEDTIME Route: SC  Start: 04/03/17 2200   Admin Instructions: Correction Scale - VERY HIGH INSULIN RESISTANCE DOSING     Do Not give Bedtime Correction Insulin if BG less than 200.   For  - 209 give 1 units.   For  - 219 give 2 units.   For  - 229 give 3 units.   For  - 239 give 4 units.   For  - 249 give 5 units.   For  - 259 give 6 units.   For  - 269 give 7 units.   For  - 279 give 8 units.   For  - 289 give 9 units.   For  - 299 give 10 units.   For  - 309 give 11 units.   For  - 319 give 12 units.   For BG  320 - 329 give 13 units.   For  - 339 give 14 units.   For  - 349 give 15 units.   For BG greater than or equal to 350 give 16 units.  Notify provider if glucose greater than or equal to 350 mg/dL after administration.  If given at mealtime, must be administered 5 min before meal or immediately after.          (2137)-Not Given [C]        [ ] 2200           insulin aspart (NovoLOG) inj (RAPID ACTING)  Dose: 1-22 Units Freq: 3 TIMES DAILY BEFORE MEALS Route: SC  Start: 04/03/17 1700   Admin Instructions: Correction Scale - VERY HIGH INSULIN RESISTANCE DOSING     Do Not give Correction Insulin if Pre-Meal BG less than 140.   For Pre-Meal  - 149 give 1 unit.   For Pre-Meal  - 159 give 2 units.   For Pre-Meal  - 169 give 3 units.   For Pre-Meal  - 179 give 4 units.   For Pre-Meal  - 189 give 5 units.   For Pre-Meal  - 199 give 6 units.   For Pre-Meal  - 209 give 7 units.   For Pre-Meal  - 219 give 8 units.   For Pre-Meal  - 229 give 9 units.   For Pre-Meal  - 239 give 10 units.   For Pre-Meal  - 249 give 11 units.   For Pre-Meal  - 259 give 12 units.   For Pre-Meal  - 269 give 13 units.   For Pre-Meal  - 279 give 14 units.   For Pre-Meal  - 289 give 15 units.   For Pre-Meal  - 299 give 16 units.   For Pre-Meal  - 309 give 17 units.   For Pre-Meal  - 319 give 18 units.   For Pre-Meal  - 329 give 19 units.   For Pre-Meal  - 339 give 20 units.   For Pre-Meal  - 349 give 21 units.   For Pre-Meal BG greater than or equal 350 give 22 units.   To be given with prandial insulin, and based on pre-meal blood glucose.   Notify provider if glucose greater than or equal to 350 mg/dL after administration.  If given at mealtime, must be administered 5 min before meal or immediately after.          1817 (3 Units)-Given [C]        0905 (4 Units)-Given       [ ] 1200       [ ] 1700           insulin  aspart (NovoLOG) inj (RAPID ACTING)  Freq: 3 TIMES DAILY WITH MEALS Route: SC  Start: 04/02/17 0900   Admin Instructions: DOSE: 1 units per CARBOHYDRATE UNIT.  Only chart total amount of units given.  Do not give if pre-prandial glucose is less than 60 mg/dL.  If given at mealtime, must be administered 5 min before meal or immediately after.         (0942)-Not Given [C]       1341 (1 Units)-Given       (1810)-Not Given [C]        (0949)-Not Given [C]       (1418)-Not Given [C]       (1810)-Not Given [C]        0906 (3 Units)-Given       [ ] 1200       [ ] 1800           insulin glargine (LANTUS) injection 10 Units  Dose: 10 Units Freq: AT BEDTIME Route: SC  Start: 04/03/17 2200         2202 (10 Units)-Given        [ ] 2200           mirtazapine (REMERON) tablet TABS 7.5 mg  Dose: 7.5 mg Freq: AT BEDTIME Route: PO  Start: 03/26/17 2200    (2154)-Not Given        2102 (7.5 mg)-Given        2139 (7.5 mg)-Given        2251 (7.5 mg)-Given        2218 (7.5 mg)-Given        2202 (7.5 mg)-Given        [ ] 2200           naloxone (NARCAN) injection 0.1-0.4 mg  Dose: 0.1-0.4 mg Freq: EVERY 2 MIN PRN Route: IV  PRN Reason: opioid reversal  Start: 03/25/17 2117   Admin Instructions: For respiratory rate LESS than or EQUAL to 8.  Partial reversal dose:  0.1 mg titrated q 2 minutes for Analgesia Side Effects Monitoring Sedation Level of 3 (frequently drowsy, arousable, drifts to sleep during conversation).Full reversal dose:  0.4 mg bolus for Analgesia Side Effects Monitoring Sedation Level of 4 (somnolent, minimal or no response to stimulation).               omeprazole (priLOSEC) CR capsule 20 mg  Dose: 20 mg Freq: EVERY MORNING Route: PO  Start: 03/26/17 0900    0900 (20 mg)-Given        0928 (20 mg)-Given        0900 (20 mg)-Given        0855 (20 mg)-Given        0927 (20 mg)-Given        0914 (20 mg)-Given        0905 (20 mg)-Given           ondansetron (ZOFRAN-ODT) ODT tab 4 mg  Dose: 4 mg Freq: EVERY 6 HOURS PRN Route:  PO  PRN Reason: nausea  Start: 03/25/17 2117   Admin Instructions: This is Step 1 of nausea and vomiting management.  If nausea not resolved in 15 minutes, go to Step 2 prochlorperazine (COMPAZINE). Do not push through foil backing. Peel back foil and gently remove. Place on tongue immediately. Administration with liquid unnecessary              Or  ondansetron (ZOFRAN) injection 4 mg  Dose: 4 mg Freq: EVERY 6 HOURS PRN Route: IV  PRN Reasons: nausea,vomiting  Start: 03/25/17 2117   Admin Instructions: This is Step 1 of nausea and vomiting management.  If nausea not resolved in 15 minutes, go to Step 2 prochlorperazine (COMPAZINE).  Irritant.               polyethylene glycol (MIRALAX/GLYCOLAX) Packet 17 g  Dose: 17 g Freq: DAILY PRN Route: PO  PRN Reason: constipation  Start: 03/25/17 2117   Admin Instructions: 1 Packet = 17 grams. Mixed prescribed dose in 8 ounces of water. Follow with 8 oz. of water.               rivaroxaban ANTICOAGULANT (XARELTO) tablet 20 mg  Dose: 20 mg Freq: EVERY EVENING Route: PO  Start: 03/25/17 2200    1752 (20 mg)-Given        1746 (20 mg)-Given        1759 (20 mg)-Given        1752 (20 mg)-Given        1810 (20 mg)-Given        1818 (20 mg)-Given        [ ] 1800           senna-docusate (SENOKOT-S;PERICOLACE) 8.6-50 MG per tablet 2 tablet  Dose: 2 tablet Freq: 3 TIMES DAILY Route: PO  Indications of Use: CONSTIPATION  Start: 03/25/17 2200    (0900)-Not Given       1752 (2 tablet)-Given       (2154)-Not Given        (0928)-Not Given       (1640)-Not Given       2102 (2 tablet)-Given        (0902)-Not Given       1758 (2 tablet)-Given       2139 (2 tablet)-Given        0855 (2 tablet)-Given       1608 (2 tablet)-Given       2251 (2 tablet)-Given        0927 (2 tablet)-Given       1952 (2 tablet)-Given [C]       2218 (2 tablet)-Given        0912 (2 tablet)-Given       1625 (2 tablet)-Given       2201 (2 tablet)-Given        0905 (2 tablet)-Given       [ ] 1600       [ ] 2200            sodium chloride (PF) 0.9% PF flush 3 mL  Dose: 3 mL Freq: EVERY 8 HOURS Route: IK  Start: 03/25/17 2130   Admin Instructions: And Q1H PRN, to lock peripheral IV dormant line.     0219 (3 mL)-Given              1502 (3 mL)-Given       2151 (3 mL)-Given        0108 (3 mL)-Given              1325 (3 mL)-Given       2104 (3 mL)-Given        0521 (3 mL)-Given       1401 (3 mL)-Given       2140 (3 mL)-Given        (0726)-Not Given [C]       0857 (3 mL)-Given              2252 (3 mL)-Given        (0923)-Not Given [C]       (1952)-Not Given [C]       (2320)-Not Given        (0915)-Not Given       (1552)-Not Given        (0149)-Not Given       (0906)-Not Given       [ ] 1600           sodium chloride (PF) 0.9% PF flush 3 mL  Dose: 3 mL Freq: EVERY 1 HOUR PRN Route: IK  PRN Reason: line flush  Start: 03/25/17 2123   Admin Instructions: for peripheral IV flush post IV meds        0140 (3 mL)-Given              tamsulosin (FLOMAX) capsule 0.4 mg  Dose: 0.4 mg Freq: DAILY Route: PO  Start: 03/30/17 1600   Admin Instructions: Administer 30 minutes after the same meal each day.  Capsules should be swallowed whole; do not crush chew or open.      1645 (0.4 mg)-Given        0900 (0.4 mg)-Given        0855 (0.4 mg)-Given        0927 (0.4 mg)-Given        0914 (0.4 mg)-Given        0905 (0.4 mg)-Given          Completed Medications  Medications 03/29/17 03/30/17 03/31/17 04/01/17 04/02/17 04/03/17 04/04/17         Dose: 1,000 mL Freq: ONCE Route: IV  Last Dose: 1,000 mL (04/02/17 0116)  Start: 04/01/17 2345   End: 04/02/17 0316        0116 (1,000 mL)-New Bag               Dose: 4 Units Freq: ONCE Route: SC  Start: 04/01/17 0815   End: 04/01/17 1129       1129 (4 Units)-Given [C]             Discontinued Medications  Medications 03/29/17 03/30/17 03/31/17 04/01/17 04/02/17 04/03/17 04/04/17         Dose: 1-7 Units Freq: AT BEDTIME Route: SC  Start: 04/01/17 2200   End: 04/03/17 1407   Admin Instructions: HIGH INSULIN RESISTANCE DOSING     Do Not give Bedtime Correction Insulin if BG less than 200.   For  - 224 give 1 units.   For  - 249 give 2 units.   For  - 274 give 3 units.   For  - 299 give 4 units.   For  - 324 give 5 units.   For  - 349 give 6 units.   For BG greater than or equal to 350 give 7 units.   Notify provider if glucose greater than or equal to 350 mg/dL after administration of correction dose.  If given at mealtime, must be administered 5 min before meal or immediately after.        (2321)-Not Given        2218 (3 Units)-Given        1407-Med Discontinued          Dose: 1-10 Units Freq: 3 TIMES DAILY BEFORE MEALS Route: SC  Start: 04/01/17 0815   End: 04/03/17 1407   Admin Instructions: Correction Scale - HIGH INSULIN RESISTANCE DOSING     Do Not give Correction Insulin if Pre-Meal BG less than 140.   For Pre-Meal  - 164 give 1 unit.   For Pre-Meal  - 189 give 2 units.   For Pre-Meal  - 214 give 3 units.   For Pre-Meal  - 239 give 4 units.   For Pre-Meal  - 264 give 5 units.   For Pre-Meal  - 289 give 6 units.   For Pre-Meal  - 314 give 7 units.   For Pre-Meal  - 339 give 8 units.   For Pre-Meal  - 364 give 9 units.   For Pre-Meal BG greater than or equal to 365 give 10 units  To be given with prandial insulin, and based on pre-meal blood glucose.   Notify provider if glucose greater than or equal to 350 mg/dL after administration of correction dose.  If given at mealtime, must be administered 5 min before meal or immediately after.        0858 (3 Units)-Given       1321 (4 Units)-Given       (1753)-Not Given [C]        (0921)-Not Given       (1307)-Not Given       1810 (4 Units)-Given        0955 (2 Units)-Given [C]       1407-Med Discontinued  1423 (4 Units)-Given              Dose: 4 Units Freq: 3 TIMES DAILY WITH MEALS Route: SC  Start: 04/01/17 0900   End: 04/01/17 2225   Admin Instructions: If given at mealtime, must be administered 5  min before meal or immediately after.        0858 (4 Units)-Given       1321 (4 Units)-Given       (1754)-Not Given [C]       2225-Med Discontinued            Dose: 13 Units Freq: AT BEDTIME Route: SC  Start: 04/01/17 2200   End: 04/01/17 2225       2225-Med Discontinued                  Dose: 7 Units Freq: AT BEDTIME Route: SC  Start: 04/01/17 2335   End: 04/03/17 1600        0117 (7 Units)-Given       2218 (7 Units)-Given        1600-Med Discontinued          Dose: 7 Units Freq: AT BEDTIME Route: SC  Start: 04/02/17 2200   End: 04/01/17 2335       2335-Med Discontinued       Medications 03/29/17 03/30/17 03/31/17 04/01/17 04/02/17 04/03/17 04/04/17

## 2017-03-25 NOTE — IP AVS SNAPSHOT
"[place] [address] ONCOLOGY: [phone]                                              INTERAGENCY TRANSFER FORM - PHYSICIAN ORDERS   3/25/2017                    Hospital Admission Date: 3/25/2017  LIANE REIS   : 1940  Sex: Male        Attending Provider: Sara Abdullahi MD     Allergies:  Compazine [Prochlorperazine], Hmg-coa-r Inhibitors, Isopropyl Alcohol    Infection:  None   Service:  HOSPITALIST    Ht:  1.74 m (5' 8.5\")   Wt:  76.5 kg (168 lb 10.4 oz)   Admission Wt:  74 kg (163 lb 2.3 oz)    BMI:  25.27 kg/m 2   BSA:  1.92 m 2            Patient PCP Information     Provider PCP Type    None General      ED Clinical Impression     Diagnosis Description Comment Added By Time Added    Adult failure to thrive [R62.7] Adult failure to thrive [R62.7]  Danica Bailey MD 3/25/2017  7:30 PM    Dehydration [E86.0] Dehydration [E86.0]  Danica Bailey MD 3/25/2017  7:30 PM    Depression, unspecified depression type [F32.9] Depression, unspecified depression type [F32.9]  Danica Bailey MD 3/25/2017  7:30 PM      Hospital Problems as of 2017              Priority Class Noted POA    Failure to thrive (0-17) Medium  3/25/2017 Yes    Parkinsonism (H) Medium  3/29/2017 Yes    Depression Medium  3/29/2017 Yes    Failure to thrive in adult Medium  3/29/2017 Yes      Non-Hospital Problems as of 2017     None      Code Status History     Date Active Date Inactive Code Status Order ID Comments User Context    3/29/2017  9:31 AM  Full Code [id number]  Zohra Beal MD Outpatient    3/25/2017  9:17 PM 3/29/2017  9:31 AM Full Code [id number]  Isis Smart PA-C Inpatient         Medication Review      START taking        Dose / Directions Comments    buPROPion 100 MG 12 hr tablet   Commonly known as:  WELLBUTRIN SR   Used for:  Depression, unspecified depression type        Dose:  100 mg   Take 1 tablet (100 mg) by mouth 2 times daily (with " meals)   Quantity:  60 tablet   Refills:  0        mirtazapine 7.5 MG Tabs tablet   Commonly known as:  REMERON   Used for:  Depression, unspecified depression type        Dose:  7.5 mg   Take 1 tablet (7.5 mg) by mouth At Bedtime   Quantity:  30 tablet   Refills:  0        tamsulosin 0.4 MG capsule   Commonly known as:  FLOMAX   Used for:  Urinary retention        Dose:  0.4 mg   Take 1 capsule (0.4 mg) by mouth daily   Quantity:  30 capsule   Refills:  0          CONTINUE these medications which may have CHANGED, or have new prescriptions. If we are uncertain of the size of tablets/capsules you have at home, strength may be listed as something that might have changed.        Dose / Directions Comments    insulin lispro 100 UNIT/ML injection   Commonly known as:  HumaLOG Jairo   This may have changed:    - how much to take  - how to take this  - when to take this  - additional instructions   Used for:  Type 2 diabetes mellitus without complication, with long-term current use of insulin (H)        1 unit per carb unit with breakfast, lunch and dinner (wait to see how much he eats and then give appropriate coverage)   Refills:  0          CONTINUE these medications which have NOT CHANGED        Dose / Directions Comments    ACETAMINOPHEN PO        Dose:  1000 mg   Take 1,000 mg by mouth 3 times daily For shoulder pain.   Refills:  0        * amylase-lipase-protease 35767 UNITS Cpep   Commonly known as:  CREON 12        Dose:  1 capsule   Take 1 capsule by mouth Take with snacks or supplements Give at 20:00   Refills:  0        * amylase-lipase-protease 98375 UNITS Cpep   Commonly known as:  CREON 12        Dose:  2 capsule   Take 2 capsules by mouth 3 times daily (with meals) Take at 08:00, 12:00, 17:00   Refills:  0        bisacodyl 10 MG Suppository   Commonly known as:  DULCOLAX        Dose:  10 mg   Place 10 mg rectally daily as needed for constipation   Refills:  0        COMPAZINE PO        Dose:  5 mg   Take  5 mg by mouth every 6 hours as needed for nausea   Refills:  0        insulin glargine 100 UNIT/ML injection   Commonly known as:  LANTUS        Dose:  10 Units   Inject 10 Units Subcutaneous At Bedtime   Refills:  0        polyethylene glycol Packet   Commonly known as:  MIRALAX/GLYCOLAX        Dose:  17 g   Take 17 g by mouth daily as needed for constipation   Refills:  0        PRILOSEC PO        Dose:  20 mg   Take 20 mg by mouth every morning   Refills:  0        RIVAROXABAN PO        Dose:  20 mg   Take 20 mg by mouth daily   Refills:  0        senna-docusate 8.6-50 MG per tablet   Commonly known as:  SENOKOT-S;PERICOLACE   Indication:  Constipation        Dose:  2 tablet   Take 2 tablets by mouth 3 times daily   Refills:  0        * Notice:  This list has 2 medication(s) that are the same as other medications prescribed for you. Read the directions carefully, and ask your doctor or other care provider to review them with you.      STOP taking     METFORMIN HCL PO           REGLAN PO                     Further instructions from your care team       [facility] [address] [id number] [phone]     Summary of Visit     Reason for your hospital stay       Frailty syndrome, physical deconditioning.             After Care     Activity - Up with nursing assistance           Advance Diet as Tolerated       Follow this diet upon discharge: Regular       Ballard catheter       To straight gravity drainage. Ballard removal in 1-2 weeks to asses ability to void on his own. If still issue with urinary retention, consider urology referral.  If the patient would like a referral to a Urology provider [facility] referral line is:  Central Appointment #: [phone]  Located at: [address] [place] [address], [address]   [place]. [address]       General info for SNF       Length of Stay Estimate: Long Term Care  Condition at Discharge: Stable  Level of care:skilled    Rehabilitation Potential: Fair  Admission H&P remains valid and up-to-date: Yes  Recent Chemotherapy: N/A  Use Nursing Home Standing Orders: Yes       Glucose monitor nursing POCT       Before meals and at bedtime       Mantoux instructions       Give two-step Mantoux (PPD) Per Facility Policy Yes             Referrals     Occupational Therapy Adult Consult       Evaluate and treat as clinically indicated.    Reason:  deconditioning       Physical Therapy Adult Consult       Evaluate and treat as clinically indicated.    Reason:  deconditioning             Follow-Up Appointment Instructions     Future Labs/Procedures    Follow Up and recommended labs and tests     Comments:    Follow up with Nursing home physician.  No follow up labs or test are needed.  You are scheduled for an initial visit with Dr. Safia Apple on Thursday April 5th at 1 p.m. She will be able to give recommendations for a Psychiatrist at that time  Located at:  [facility]  Close  [address] [place] [address]  Phone: [phone]    You are scheduled for a [place] [facility] appointment to discuss Dysmotility medications with Physicians Assistant Martinez on April 12tha t 10:20 a.m. Located at:  [facility]   [address] [place] [address],  Suites: [address] (Clinic) / [address] (Endoscopy Center)    [address]  Phone: [phone]      Follow-Up Appointment Instructions     Follow Up and recommended labs and tests       Follow up with Nursing home physician.  No follow up labs or test are needed.  You are scheduled for an initial visit with Dr. Safia Apple on Thursday April 5th at 1 p.m. She will be able to give recommendations for a Psychiatrist at that time  Located at:  [facility]  Close  [address] [place] [address]  Phone: [phone]    You are scheduled for a [place] [facility]  appointment to discuss Dysmotility medications with Physicians Assistant Martinez on April 12tha t 10:20 a.m. Located at:  [facility]   [address] [facility],  S[address]es: [address] (Clinic) / [address] (Endoscopy Center)    [place] [address]   [place]  Phone: [phone]             Statement of Approval     Ordered          04/04/17 0805  I have reviewed and agree with all the recommendations and orders detailed in this document.  EFFECTIVE NOW     Approved and electronically signed by:  Sanket Hilario DO           04/03/17 1404  I have reviewed and agree with all the recommendations and orders detailed in this document.  EFFECTIVE NOW     Approved and electronically signed by:  Sanket Hilario DO           03/29/17 0947  I have reviewed and agree with all the recommendations and orders detailed in this document.  EFFECTIVE NOW     Approved and electronically signed by:  Zohra Beal MD

## 2017-03-25 NOTE — ED NOTES
"Daughter reports that patient was in the hospital about 6 weeks ago for pneumonia. From there he went to a TCU and then transitioned into assisted living.  Assisted living felt that they could not do enough for him as he was continuing to decline. Patient moved into [facility] on 3/13/17 where it sounds like he has continued to decline.  Daughter states that patient said the other day, \"I can't find the light\".  "

## 2017-03-25 NOTE — IP AVS SNAPSHOT
[address], [address]    [facility] [address]    Phone:  [phone]                                       After Visit Summary   3/25/2017    Reginald Riley    MRN: [id number]           After Visit Summary Signature Page     I have received my discharge instructions, and my questions have been answered. I have discussed any challenges I see with this plan with the nurse or doctor.    ..........................................................................................................................................  Patient/Patient Representative Signature      ..........................................................................................................................................  Patient Representative Print Name and Relationship to Patient    ..................................................               ................................................  Date                                            Time    ..........................................................................................................................................  Reviewed by Signature/Title    ...................................................              ..............................................  Date                                                            Time

## 2017-03-26 NOTE — PHARMACY-ADMISSION MEDICATION HISTORY
Admission medication history interview status for the 3/25/2017  admission is complete. See EPIC admission navigator for prior to admission medications     Medication history source reliability:Good    Actions taken by pharmacist (provider contacted, etc): contacted nursing home to obtain  MAR     Additional medication history information not noted on PTA med list :all meds entered    Medication reconciliation/reorder completed by provider prior to medication history? Yes    Time spent in this activity: 25 min    Prior to Admission medications    Medication Sig Last Dose Taking? Auth Provider   Prochlorperazine Maleate (COMPAZINE PO) Take 5 mg by mouth every 6 hours as needed for nausea prn Yes Unknown, Entered By History   amylase-lipase-protease (CREON 12) 81857 UNITS CPEP Take 1 capsule by mouth Take with snacks or supplements Give at 20:00 3/24/2017 at 20:00 Yes Unknown, Entered By History   amylase-lipase-protease (CREON 12) 17412 UNITS CPEP Take 2 capsules by mouth 3 times daily (with meals) Take at 08:00, 12:00, 17:00 3/25/2017 at 12:00 Yes Unknown, Entered By History   ACETAMINOPHEN PO Take 1,000 mg by mouth 3 times daily For shoulder pain. 3/25/2017 at 12:00 Yes Unknown, Entered By History   bisacodyl (DULCOLAX) 10 MG Suppository Place 10 mg rectally daily as needed for constipation prn Yes Unknown, Entered By History   METFORMIN HCL PO Take 1,000 mg by mouth 2 times daily (with meals) 3/25/2017 at 8:00 Yes Unknown, Entered By History   insulin lispro (HUMALOG KWIKPEN) 100 UNIT/ML injection Inject 2 Units Subcutaneous 3 times daily (before meals) 07:30, 11:45, 17:15 3/25/2017 at 17:15 Yes Unknown, Entered By History   insulin glargine (LANTUS) 100 UNIT/ML injection Inject 10 Units Subcutaneous At Bedtime 3/24/2017 at hs Yes Unknown, Entered By History   Omeprazole (PRILOSEC PO) Take 20 mg by mouth every morning 3/25/2017 at 07:00 Yes Unknown, Entered By History   Metoclopramide HCl (REGLAN PO) Take 5 mg by  mouth 3 times daily 07:30, 11:30, 17:00 3/25/2017 at 11:30 Yes Unknown, Entered By History   RIVAROXABAN PO Take 20 mg by mouth daily 3/24/2017 at 17:30 Yes Unknown, Entered By History   senna-docusate (SENOKOT-S;PERICOLACE) 8.6-50 MG per tablet Take 2 tablets by mouth 3 times daily 3/25/2017 at 12:00 Yes Unknown, Entered By History   polyethylene glycol (MIRALAX/GLYCOLAX) Packet Take 17 g by mouth daily as needed for constipation prn Yes Unknown, Entered By History

## 2017-03-26 NOTE — PROGRESS NOTES
Dtr indicates she is a Co HC Directive Agent w/ her uncle/ Pt's brother.  Pt's Brother is out of the country at present and will arrive back in town tomorrow evening.  Pt's brother and dtr wish to have a family care conference regarding GOC and pt support on Tuesday if Palliative Care is available.  Sticky note to treatment team requesting same    A: Family wishing to have Palliative GOC meeting Tuesday.    Bel Paulinon Care Transitions Nurse,  RN, BSN, [facility]  Cell Phone # [phone]

## 2017-03-26 NOTE — PROGRESS NOTES
[facility]  Hospitalist Progress Note          Assessment and Plan:   Mr Reginald Riley is a 76-year-old male with past medical history of ampullary cancer, AML, hypertension and slow transit constipation as well as gastroparesis history admitted from long-term care facility for failure to thrive.      Failure to thrive:  - per daughter, this sounds like this has been a prolonged drawn out issue since his initial Whipple procedure 04/2015  Due to ampullary cancer. Patient previously evaluated by Palliative Care during a hospitalization at [facility]. He has previously seen Psychiatry, but has been resistant to pharmacotherapy in the past. He has a POLST form from his long-term care facility stating that he is full code. Briefly discussed options with the family. Currently, no reversible illness inside of infection or other acute abnormality. We discussed possibilities including psychiatry consultation for possible pharmacotherapy of depression. The patient reports that he is interested in this and this will be ordered. Additionally, discuss palliative care consult in case more of a hospice type approach is preferred for patient. Family is very much interested in this. Patient's daughter notes that the patient's brother who is also an additional power of  will be arriving from [place] tomorrow. In the interim, we will hydrate gently with IV fluids. Will consult social work to assist with discharge planning.      Major depression:  - resistant to pharmacotherapy in the past.  - appreciate psychiatry consult- started on Wellbutrin  mg bid & Remeron     Insulin-dependent type 2 diabetes:   - Prior to admission regimen includes Lantus 10 units at bedtime, Humalog 2 units t.i.d. with meals &  Metformin 1000 mg twice daily.  - BS in 200- 300s on Lantus & ISS & with holding pre meal Humalog & metformin.   - restart PTA premeal Humalog 10 U at HS & 2 U AC, continue PTA Lantus & ISS     History of  ampullary cancer, status post Whipple procedure in 2015 with subsequent gastroparesis and slow transit constipation: Daughter notes that he is not currently followed by Oncology.   - continue with PTA senna- ducosate - 2 pills tid     Atrial fibrillation: The patient is rate controlled without sho blocking agents. Will continue prior to admission Xarelto.      Deep venous thrombosis prophylaxis: Xarelto.       CODE STATUS: Full code.      Disposition: consult social work to assist with discharge planning.       Sara Abdullahi MD  Hospitalist             Interval History:   Pt's daughter at bedside. No new c/o. Ate better since in hospital.              Medications:       buPROPion  100 mg Oral BID w/meals     mirtazapine  7.5 mg Oral At Bedtime     acetaminophen (TYLENOL) tablet 1,000 mg  1,000 mg Oral TID     amylase-lipase-protease  2 capsule Oral TID w/meals     omeprazole (priLOSEC) CR capsule 20 mg  20 mg Oral QAM     rivaroxaban ANTICOAGULANT  20 mg Oral QPM     senna-docusate  2 tablet Oral TID     insulin aspart  1-7 Units Subcutaneous TID AC     insulin aspart  1-5 Units Subcutaneous At Bedtime     insulin glargine  10 Units Subcutaneous At Bedtime     sodium chloride (PF)  3 mL Intracatheter Q8H     amylase-lipase-protease, polyethylene glycol, naloxone, acetaminophen, acetaminophen, ondansetron **OR** ondansetron, glucose **OR** dextrose **OR** glucagon, sodium chloride (PF)               Physical Exam:   Blood pressure 142/59, pulse 66, temperature 97.6  F (36.4  C), temperature source Oral, resp. rate 18, weight 74 kg (163 lb 2.3 oz), SpO2 96 %.  Wt Readings from Last 4 Encounters:   17 74 kg (163 lb 2.3 oz)         Vital Sign Ranges  Temperature Temp  Av  F (36.1  C)  Min: 96.2  F (35.7  C)  Max: 97.7  F (36.5  C)   Blood pressure Systolic (24hrs), Av , Min:110 , Max:142        Diastolic (24hrs), Av, Min:47, Max:66      Pulse Pulse  Av  Min: 66  Max: 70    Respirations Resp  Av.4  Min: 16  Max: 40   Pulse oximetry SpO2  Av %  Min: 95 %  Max: 99 %         Intake/Output Summary (Last 24 hours) at 17 1456  Last data filed at 17 1105   Gross per 24 hour   Intake              907 ml   Output              850 ml   Net               57 ml       Constitutional: Awake, alert, cooperative, no apparent distress   Lungs: Clear to auscultation bilaterally, no crackles or wheezing   Cardiovascular: Regular rate and rhythm, normal S1 and S2, and no murmur noted   Abdomen: Normal bowel sounds, soft, non-distended, non-tender   Skin: No rashes, no cyanosis, no edema                   Data:   All laboratory data reviewed

## 2017-03-26 NOTE — H&P
PRIMARY CARE PROVIDER:  None.      CHIEF COMPLAINT:  Failure to thrive.      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  History is obtained entirely from patient's daughter and review of medical record.  The patient underwent a Whipple procedure on 04/2015 for ampullary cancer.  This is reportedly a complicated surgery with a prolonged postoperative recovery.  He required a feeding tube and has had issues with gastroparesis and slow transit constipation since the procedure.  Daughter notes he has never fully returned back to baseline.  He has slowly transitioned over the past few years from living independently into assisted living and now into long-term care.  He was most recently living in an assisted living facility in [place].  In mid-February he was admitted to [facility] for evaluation of DKA in the setting of healthcare-acquired pneumonia.  Additionally, he was diagnosed with atrial fibrillation, initiated on Xarelto.  He was discharged to a transitional care facility due to significant weakness and failure to thrive.  Reportedly, at the transitional care facility he was unwilling or unable to participate in therapy.  He exceeded his number of days Medicare for transitional care facility and so he was discharged to a long-term care facility, [facility], 10 days ago.  Daughter notes that over the past 10 days he has refused to get out of bed.  He has refused most of his meals and has been refusing medications.  The facility became concerned and sent him to the Emergency Department for  "further evaluation.      In the Emergency Department, he was evaluated by Dr. Bailey.  Vitals were stable upon arrival.  Laboratory evaluation was obtained which showed a blood glucose of 246, CBC with WBC 11.6, BNP largely unremarkable.  A chest x-ray was obtained and was negative.  Given the patient's failure to thrive, admission was requested for further evaluation.      Presently the patient is evaluated in the Emergency Department.  He offers few complaints at this time.  He answers questions minimally.  He is alert and oriented to person and place.  He does not know the month, but did know the president.  Daughter notes she feels as though her dad has \"lost the will to live\".  The patient does not really elaborate on this.  It does appear as though he was previously evaluated by mental health but has refused pharmacotherapy in the past.  Additionally, he has a questionable diagnosis of Parkinson's.  He has never been evaluated by Neurology.  During a previous hospitalization at [facility] outpatient neurologic workup was recommended; however, it does appear that this was ever pursued.      PAST MEDICAL HISTORY:   1.  Insulin-dependent type 2 diabetes.   2.  History of AML.   3.  History of ampullary cancer, status post Whipple procedure.   4.  Hypertension.   5.  Slow transit constipations with gastroparesis.      PRIOR TO ADMISSION MEDICATIONS:  Prior to Admission medications    Medication Sig Last Dose Taking? Auth Provider   Prochlorperazine Maleate (COMPAZINE PO) Take 5 mg by mouth every 6 hours as needed for nausea prn Yes Unknown, Entered By History   amylase-lipase-protease (CREON 12) 35429 UNITS CPEP Take 1 capsule by mouth Take with snacks or supplements Give at 20:00 3/24/2017 at 20:00 Yes Unknown, Entered By History   amylase-lipase-protease (CREON 12) 93993 UNITS CPEP Take 2 capsules by mouth 3 times daily (with meals) Take at 08:00, 12:00, 17:00 3/25/2017 at 12:00 Yes Unknown, Entered By History "   ACETAMINOPHEN PO Take 1,000 mg by mouth 3 times daily For shoulder pain. 3/25/2017 at 12:00 Yes Unknown, Entered By History   bisacodyl (DULCOLAX) 10 MG Suppository Place 10 mg rectally daily as needed for constipation prn Yes Unknown, Entered By History   METFORMIN HCL PO Take 1,000 mg by mouth 2 times daily (with meals) 3/25/2017 at 8:00 Yes Unknown, Entered By History   insulin lispro (HUMALOG KWIKPEN) 100 UNIT/ML injection Inject 2 Units Subcutaneous 3 times daily (before meals) 07:30, 11:45, 17:15 3/25/2017 at 17:15 Yes Unknown, Entered By History   insulin glargine (LANTUS) 100 UNIT/ML injection Inject 10 Units Subcutaneous At Bedtime 3/24/2017 at hs Yes Unknown, Entered By History   Omeprazole (PRILOSEC PO) Take 20 mg by mouth every morning 3/25/2017 at 07:00 Yes Unknown, Entered By History   Metoclopramide HCl (REGLAN PO) Take 5 mg by mouth 3 times daily 07:30, 11:30, 17:00 3/25/2017 at 11:30 Yes Unknown, Entered By History   RIVAROXABAN PO Take 20 mg by mouth daily 3/24/2017 at 17:30 Yes Unknown, Entered By History   senna-docusate (SENOKOT-S;PERICOLACE) 8.6-50 MG per tablet Take 2 tablets by mouth 3 times daily 3/25/2017 at 12:00 Yes Unknown, Entered By History   polyethylene glycol (MIRALAX/GLYCOLAX) Packet Take 17 g by mouth daily as needed for constipation prn Yes Unknown, Entered By History       PAST SURGICAL HISTORY:   1.  Cataracts.   2.  Prostatectomy.   3.  Appendectomy.   4.  ERCP.   5.  Whipple procedure.      FAMILY HISTORY:  Sister with diabetes.      SOCIAL HISTORY:  He is a nonsmoker, does not drink alcohol.  He is .  Currently living in a long-term care facility.      REVIEW OF SYSTEMS:  A 10-point review of systems was completed.  Pertinent positives in the HPI.  All other systems negative.      PHYSICAL EXAMINATION:     GENERAL:  Reginald Riley is a well-developed, well-nourished 76-year-old male who appears his stated age and is lying in bed.   VITAL SIGNS:  Blood pressure is  111/47, heart rate 98, temperature 96.2.   HEENT:  Head normocephalic, atraumatic.  Eyes:  Pupils equal, round, reactive to light.   NECK:  Supple, no adenopathy, no thyromegaly.   CARDIOVASCULAR:  Irregularly irregular, no murmurs noted.   PULMONARY:  Normal effort.  Clear to auscultation bilaterally.   GASTROINTESTINAL:  Abdomen nondistended, nontender, diminished bowel sounds.   EXTREMITIES:  Moves all 4 extremities, trace lower extremity edema.   NEUROLOGIC:  He is alert to person and place, knows the year, but not the month, knows the president.  Cranial nerves II-XII are grossly intact.      LABORATORY EVALUATION:  Reviewed in Epic.      ASSESSMENT:  Reginald Riley is a 76-year-old male with past medical history of ampullary cancer, AML, hypertension and slow transit constipation as well as gastroparesis history admitted from long-term care facility for failure to thrive.   1.  Failure to thrive.  In discussion with daughter, this sounds like this has been a prolonged drawn out issue since his initial Whipple procedure 04/2015.  I do see that he was previously evaluated by Palliative Care during a hospitalization at [facility].  He has previously seen Psychiatry, but has been resistant to pharmacotherapy in the past.  He has a POLST form from his long-term care facility stating that he is full code.  Briefly discussed options with the family.  Currently, no reversible illness or sign of infection or other acute abnormality.  We discussed possibilities including psychiatry consultation for possible pharmacotherapy of depression.  The patient reports that he is interested in this and this will be ordered.  Additionally, discuss palliative care consult in case more of a hospice type approach is preferred for patient.  Family is very much interested in this.  Patient's daughter notes that the patient's brother who is also an additional power of  will be arriving from [place] tomorrow.  In the interim, we will  hydrate gently with IV fluids.  Will consult social work to assist with discharge planning.   2.  Insulin-dependent type 2 diabetes.  Prior to admission regimen includes Lantus 10 units at bedtime,   Humalog 2 units t.i.d. with meals and Metformin 1000 mg twice daily.  The patient has reportedly had significantly diminished oral intake.  His glucose is 246.  Will continue prior to admission Lantus.  Hold metformin.  Will hold meal dose Humalog.  Will place on sliding scale insulin with meals and at bedtime.   3.  History of ampullary cancer, status post Whipple procedure in 2015 with subsequent gastroparesis and slow transit constipation.  Daughter notes that he is not currently followed by Oncology.  Will continue prior to admission Creon with meals as well as bowel regimen and Reglan.   4.  Atrial fibrillation.  The patient is rate controlled without sho blocking agents.  Will continue prior to admission Xarelto.   5.  Deep venous thrombosis prophylaxis.  Xarelto.      CODE STATUS:  Full code.      The patient was discussed with Dr. Dominga Christopher of the Hospitalist Service who independently interviewed the patient.  She is agreeable to plan.         SOPHIE CHRISTOPHER DO       As dictated by ENOCH KATHLEEN PA-C            D: 2017 21:20   T: 2017 22:39   MT: [id number]      Name:     LIANE REIS   MRN:      [id number]        Account:      [id number]   :      1940           Admitted:     [id number]      Document: [id number]       cc: ENOCH Christopher DO

## 2017-03-26 NOTE — ED NOTES
"[facility]  ED Nurse Handoff Report    ED Chief complaint: Altered Mental Status (Patient coming from [facility]; staff told EMS that patient has not been verbally responsive today, refusing to eat, refusing to get out of bed - this is a change from yesterday. )      ED Diagnosis:   Final diagnoses:   Adult failure to thrive   Dehydration   Depression, unspecified depression type       Code Status: Full Code    Allergies:   Allergies   Allergen Reactions     Compazine [Prochlorperazine]      Hmg-Coa-R Inhibitors      Isopropyl Alcohol        Activity level:  Total Care     Needed?: No    Isolation: No  Infection: Not Applicable    Bariatric?: No      Vital Signs:   Vitals:    03/25/17 1744 03/25/17 1845 03/25/17 1900   BP: 110/60 117/63 135/64   Pulse: 70     Resp: (!) 40 22 26   Temp: 97.7  F (36.5  C)     TempSrc: Oral     SpO2: 97% 97% 98%       Cardiac Rhythm: ,    Atrial Fibrillation in the 90s     Pain level: 0-10 Pain Scale: 0    Is this patient confused?: No    Patient Report: Initial Complaint: Daughter reports that patient was in the hospital about 6 weeks ago for pneumonia. From there he went to a TCU and then transitioned into assisted living. Assisted living felt that they could not do enough for him as he was continuing to decline. Patient moved into [facility] on 3/13/17 where it sounds like he has continued to decline. Daughter states that patient said the other day, \"I can't find the light\".  Focused Assessment: Cardiac: Atrial fibrillation (Rate in the 70s to 90s). Respiratory: LS diminished throughout. Neuro: Pt is generally weak and lethargic. Hx parkinson's. Speech is quiet (almost a whisper) and slow. Affect is flat. GI: Decreased appetite and nausea reported. Pt has had 4 juices here in ED thus far. He reports being quite thirsty.  : Markedly decreased UO. Initially after catheter was inserted, there was no urine but it has slowly started " flowing. Urine sent to lab.   Tests Performed: CBC w/diff. CMP. VBGS. CXR. Blood cultures. UA   Abnormal Results: Gluc 246.. WBC 11.6. Hgb 13.   Treatments provided: 2 L Bolus.     Family Comments: At bedside     OBS brochure/video discussed/provided to patient: N/A    ED Medications:   Medications   0.9% sodium chloride BOLUS (1,000 mLs Intravenous New Bag 3/25/17 1943)   0.9% sodium chloride BOLUS (0 mLs Intravenous Stopped 3/25/17 1940)       Drips infusing?:  Yes      ED NURSE PHONE NUMBER: [phone]

## 2017-03-26 NOTE — PROGRESS NOTES
Care Transition Initial Assessment - SW  Reason For Consult: discharge planning  Met with: SW will follow up with pt/family after discussion with palliative team on Monday   Active Problems:    Failure to thrive (0-17)         DATA  Lives With: facility resident  Living Arrangements: residential facility  Description of Support System: Supportive, Involved  Who is your support system?: Children  Support Assessment: Adequate family and caregiver support, Adequate social supports.   Quality Of Family Relationships: supportive, involved     Per social service protocol for discharge planning. Ot is a 76-year-old male admitted on 3/25/17 with Failure to Thrive. Per chart review,  in mid-February pt was admitted to [facility] for evaluation of DKA in the setting of healthcare-acquired pneumonia. He was discharged to a transitional care facility due to significant weakness and failure to thrive. Reportedly, at the transitional care facility he was unwilling or unable to participate in therapy and exceeded his  number of days Medicare for transitional care facility and so he was discharged to [facility] unit 11 days ago. Per chart review, i pt's daughter noted that over the past 11 days pt has refused to get out of bed. He has refused most of his meals and has been refusing medications. The facility became concerned and sent him to the Emergency Department for further evaluation.     Palliative has been consulted and should hopefully meet with pt/daughter on Monday. SW awaits discharge recommendations and will follow up with family after discussion of Goals of Care.     ASSESSMENT  Cognitive Status:  Unable to assess at this time   Concerns to be addressed: Discharge disposition       PLAN  Financial costs for the patient includes: If requiring w/c ride for transport.   Patient given options and choices for discharge: N/A   Patient Goals and Preferences: Unknown at this time  Patient  anticipates discharging to:  Likely back to LTC at [facility].       MATILDE Solis, [facility]  Ph: [phone]

## 2017-03-26 NOTE — PLAN OF CARE
Problem: Goal Outcome Summary  Goal: Goal Outcome Summary  Outcome: No Change  Reason for Admission: Adult failure to thrive [R62.7]  Dehydration [E86.0]  Depression, unspecified depression type [F32.9]  Pertinent Medical History: A fib, DM2, multiple cancers     Plans for DC: pt from [facility] when stable?     DO NOT REMOVE ANY FIELDS BELOW - TYPE N/A OR WNL IF NO INFO TO ADD  Procedure/POD: none  Neuro/Psych/Sleep: very tired; slow to respond to questions but AOx4  CV/Tele/Abnormal VS:hx Afib  Resp: WDL  Skin/Wound:   /GI/Diet: decreased appetite; regular diet; Blood sugars QID  Ballard  IV: R arm; infusing  Pain: denies  Chemo/Radiation: n/a  Abnormal Labs/Glucose: BG= 345  Activity/Safety: alarms on  Consults:  Education:call, don't fall

## 2017-03-26 NOTE — PLAN OF CARE
Problem: Goal Outcome Summary  Goal: Goal Outcome Summary  Outcome: Improving  Reason for Admission: Adult failure to thrive [R62.7]  Dehydration [E86.0]  Depression, unspecified depression type [F32.9]  Pertinent Medical History: A fib, DM2, HTN, multiple cancers     Plans for DC: pt from [facility] back when stable.     DO NOT REMOVE ANY FIELDS BELOW - TYPE N/A OR WNL IF NO INFO TO ADD  Procedure/POD: none  Neuro/Psych/Sleep: Pt alert to self/place. Disoriented to time/situation. Extremely quiet, slow to respond.   CV/Tele/Abnormal VS: Hx of Afib, tele not ordered.  Resp: Diminished  Skin/Wound: WDL, pale, cool extremities.  /GI/Diet: Reg diet, decreased appetite. Ballard patent, good output. Loose BM - sample sent to r/o cdiff. Enteric precautions initiated.   IV: R PIV, infusing NS @ 100mL/hr.  Pain: Pain at tip of penis, declined any interventions offered.   Chemo/Radiation: n/a  Abnormal Labs/Glucose: BS checks, given SSI as needed.  Activity/Safety: Up A2/GB/Walker. Unsteady, needs encouragement for transfers from bed to chair or commode.   Consults: Psych, palliative, social work  Education: Call light.

## 2017-03-26 NOTE — UTILIZATION REVIEW
"  Admission Status; Secondary Review Determination         Under the authority of the Utilization Management Committee, the utilization review process indicated a secondary review on the above patient.  The review outcome is based on review of the medical records, discussions with staff, and applying clinical experience noted on the date of the review.          (x) Observation Status Appropriate - This patient does not meet hospital inpatient criteria and is placed in observation status. If this patient's primary payer is Medicare and was admitted as an inpatient, Condition Code 44 should be used and patient status changed to \"observation\".     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 BMP normal after gentle hydration overnight. No clear indication for inpatient admission, patient receiving gentle hydration and coordination of care. The severity of illness, intensity of service provided, expected LOS and risk for adverse outcome make the care appropriate for further observation; however, doesn't meet criteria for hospital inpatient admission. Discussed with Dr Abdullahi.    This document was produced using voice recognition software.      The information on this document is developed by the utilization review team in order for the business office to ensure compliance.  This only denotes the appropriateness of proper admission status and does not reflect the quality of care rendered.   "       The definitions of Inpatient Status and Observation Status used in making the determination above are those provided in the CMS Coverage Manual, Chapter 1 and Chapter 6, section 70.4.      Sincerely,     IRAIS FERNÁNDEZ MD    System Medical Director  Utilization Management  [facility].

## 2017-03-26 NOTE — CONSULTS
"PSYCHIATRY CONSULTATION       REQUESTING PHYSICIAN:  Isis Smart PA-C       REASON FOR CONSULTATION:   Depression.         IDENTIFYING DATA:  Mr. Reignald Riley is an unfortunate 76-year-old  male who presents with failure to thrive in the context of a history of ampullary cancer, status post Whipple procedure in 2015.      CHIEF COMPLAINT:  \"I don't feel good.\"      HISTORY OF PRESENT ILLNESS:  The patient is a 76-year-old gentleman who presents with a failure to thrive picture.  He underwent a Whipple procedure in 2015 for ampullary cancer.  He has been in a long-term care facility.  According to his daughter, he has not been doing well there.  He has had gastroparesis, issues with constipation and really never got back to baseline after that procedure.  He had been living independently but now is in long-term care.  He was at [facility] with DKA and had pneumonia.  He has a history of atrial fibrillation and got put on Xarelto.  He has become increasingly weak and eventually moved into [facility] about 10 days ago.  He is not getting out of bed.  He is not taking his pills and is not eating.  They have not been able to define any specific acute medical issues though there have been questions about whether he might have Parkinson's disease.  He has never seen Neurology.      On interview, the patient is hypophonic, he responds softly and slowly, he has a bit of a piercing stare, does not identify being depressed, but he has some very significant psychomotor slowing and admits that he has no appetite and he cannot sleep at night.  He does not really identify being depressed.  He does not appear grossly tremulous but he seems to be having trouble manipulating his utensils to eat.  I reviewed his labs and I do not see anything grossly abnormal though he did have some glucose in his urine and a few white blood cells as well as an elevated blood sugar on admission.  He is a full code.  " They have consulted Palliative Care.      On further questioning, the patient denies a specific history of mental illness, chemical dependency, previous treatment for depression or family history as such.  He seems fully oriented, though very slow to answer my questions.  He has a very limited list of medications at this point, nothing that I would be concerned about as far as delirium.  He is not endorsing any psychosis, generalized anxiety, panic disorder, obsessive-compulsive disorder, past hypomania or mary.      PAST PSYCHIATRIC HISTORY:  Unremarkable.      PAST CHEMICAL DEPENDENCY HISTORY:  Negative.      PAST MEDICAL HISTORY:  Whipple procedure for ampullary cancer, hypertension, constipation, gastroparesis, insulin-dependent type 2 diabetes, history of AML and his current presentation with failure to thrive.        LISTED MEDICATIONS:  Currently, IV fluids, Creon, NovoLog insulin, Lantus insulin, Reglan, Prilosec, Xarelto, Senokot-S, p.r.n. Tylenol, p.r.n. Zofran, p.r.n. MiraLax.      FAMILY HISTORY:  Negative for mental illness, chemical dependency or suicide.      SOCIAL HISTORY:  The patient is .  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  He now has been in long-term care because of his failing health.  He comes from a large family of 8 children.        REVIEW OF SYSTEMS:  A 10-point review is unchanged from the initial H&P dated 03/25/2017 by Isis Smart PA-C at 9:20 p.m.  Most recent vital signs:  Temperature 97.6, pulse 66, heart rate 82, respiratory rate 18, blood pressure 142/59, oxygen saturation 96%.      MENTAL STATUS EXAMINATION:  Appearance:  The patient is a slightly disheveled man sitting at the side of his bed, attempting to eat.  He has a blank facial appearance and seems to be moving very slowly.  He is a marginal historian.  Speech is hypophonic, use of language, poor.  Motor exam is slowed and bradyphrenic.  Coordination, station,  "gait impaired.  Muscle strength and tone are diminished.  Affect blunted.  Mood \"okay.\"  Thought process logical, coherent and goal directed, though he is slow to respond and very impoverished.  Thought content negative for hallucinations, delusions, paranoia, suicidal or homicidal ideation.  Insight and judgment fair.  Cognition:  The patient is alert, oriented x3.  Concentration poor.  Recent memory fair.  Remote memory grossly intact.  General fund of knowledge average.      IMPRESSION:  The patient is a 76-year-old man presenting with a failure to thrive picture.  He looks very bradyphrenic, clinically depressed, though I cannot rule out some parkinsonian type features, given his masked facial appearance.  I note that he is on Reglan and I would suggest that this be discontinued and could certainly be a contributing factor if there is underlying Parkinson's.  Adding a small dose of Wellbutrin, which is a dopaminergic and more activating antidepressant, is a reasonable course of action as well as adding a small dose of mirtazapine at bedtime to help sleep and promote appetite.  I think it would be reasonable ask Neurology to see him.      DIAGNOSES:     1.  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   2.  Failure to thrive.   3.  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   4.  Type 2 diabetes.   5.  Rule out Parkinson's disease versus parkinsonism from Reglan.      PLAN:   1.  Initiate Wellbutrin  mg b.i.d. along with a small dose of Remeron 7.5 mg each day at bedtime.   2.  Consider Neurology consult.   3.  Discontinue Reglan to see if this improves the situation in the off chance this could be inducing some parkinsonism.   4.  We will follow as needed.   5.  I did try to leave a message with his daughter.  She did not  on her cell phone but I am assuming she will try to call back.         AGNIESZKA TEIXEIRA MD             D: 03/26/2017 09:22   T: " 2017 10:24   MT: jay      Name:     LIANE REIS   MRN:      [id number]        Account:       [id number]   :      1940           Consult Date:  2017      Document: [id number]       cc: ENOCH KATHLEEN PA-C

## 2017-03-26 NOTE — PLAN OF CARE
Problem: Discharge Planning  Goal: Discharge Planning (Adult, OB, Behavioral, Peds)  CM: Anticipate goal for safe discharge plan, probably back to LTC - 3/26

## 2017-03-27 NOTE — CONSULTS
[facility] Follow-up Psychiatric Consult Progress Note      Interval History:   Pt seen, chart reviewed, care reviewed with treatment team.Reginald reports he slept well. He is oriented to self. Tolerating medications without side effects. Side effects, risks, and benefits of medications reviewed with patient. Denies suicidal or homicidal ideation. He denies feeling depressed, but is very slowed and hypophonic with c/o poor appetite. I don't see coggwheeling or tremor, and he is off reglan. Palliative care will be seeing him.   Review of systems:    10 point Review of Systems completed is negative other than noted in the HPI,  BP/P/temp, weight reviewed.     Medications:       buPROPion  100 mg Oral BID w/meals     mirtazapine  7.5 mg Oral At Bedtime     insulin aspart  2 Units Subcutaneous TID w/meals     acetaminophen (TYLENOL) tablet 1,000 mg  1,000 mg Oral TID     amylase-lipase-protease  2 capsule Oral TID w/meals     omeprazole (priLOSEC) CR capsule 20 mg  20 mg Oral QAM     rivaroxaban ANTICOAGULANT  20 mg Oral QPM     senna-docusate  2 tablet Oral TID     insulin aspart  1-7 Units Subcutaneous TID AC     insulin aspart  1-5 Units Subcutaneous At Bedtime     insulin glargine  10 Units Subcutaneous At Bedtime     sodium chloride (PF)  3 mL Intracatheter Q8H     amylase-lipase-protease, polyethylene glycol, naloxone, acetaminophen, acetaminophen, ondansetron **OR** ondansetron, glucose **OR** dextrose **OR** glucagon, sodium chloride (PF)      Mental Status Examination:     Appearance:  dressed in hospital scrubs, appeared as age stated, poorly groomed and somnolent  Eye Contact:  intense  Speech:  decreased prosody and paucity of speech  Use of Language:Appropriate  Psychomotor Behavior:  no evidence of tardive dyskinesia, dystonia, or tics and physical retardation  Mood:  good  Affect:  intensity is blunted and intensity is flat  Thought Process:  logical, linear, goal oriented and slowed no loose  "associations  Thought Content:  no evidence of suicidal ideation or homicidal ideation and no evidence of psychotic thought  Oriented to:  person and day, said \"I'm at [facility]\"  Attention Span and Concentration:  poor  Recent and Remote Memory:  limited  Fund of Knowledge: delayed  Muscle Strength and Tone: weak  Coordination, Station and Gait:not tested  Insight:  limited  Judgment:  limited        Labs/vitals:     Recent Results (from the past 24 hour(s))   Glucose by meter    Collection Time: 03/26/17  7:44 AM   Result Value Ref Range    Glucose 254 (H) 70 - 99 mg/dL   Clostridium difficile toxin B PCR    Collection Time: 03/26/17 10:30 AM   Result Value Ref Range    Specimen Description Feces     C Diff Toxin B PCR  NEG     Negative  Negative: Clostridium difficile target DNA sequences NOT detected, presumed   negative for Clostridium difficile toxin B or the number of bacteria present   may be below the limit of detection for the test.   FDA approved assay performed using [employer] GeneXpert real-time PCR.   A negative result does not exclude actual disease due to Clostridium difficile   and may be due to improper collection, handling and storage of the specimen or   the number of organisms in the specimen is below the detection limit of the   assay.     Glucose by meter    Collection Time: 03/26/17  1:49 PM   Result Value Ref Range    Glucose 235 (H) 70 - 99 mg/dL   Glucose by meter    Collection Time: 03/26/17  6:14 PM   Result Value Ref Range    Glucose 351 (H) 70 - 99 mg/dL   Glucose by meter    Collection Time: 03/26/17 10:01 PM   Result Value Ref Range    Glucose 370 (H) 70 - 99 mg/dL   Glucose by meter    Collection Time: 03/27/17  2:05 AM   Result Value Ref Range    Glucose 315 (H) 70 - 99 mg/dL     B/P: 108/45, T: 96.2, P: 74, R: 20    Impression:   Reginald looks clinically depressed, though parkinsonism, and possibly a component of delirium are present. Reglan could worsen parkinsonism. Palliative " care will be seeing him      DIagnoses:   1. 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 Parkinsonism from meds??   3. Delirium, multifactorial suspected       Plan:   1. Written information given on medications. Side effects, risks, benefits reviewed.  2. Continue wellbutrin, low dose remeron for AD effect-these will take time to work  3. Reglan on hold  4. Palliative care to see  5. Consider neuro consult if there is a clearer sense he might have parkinsons-difficult to say in this context with so many variables      Attestation:  Patient has been seen and evaluated by me,  Kevin Murphy MD

## 2017-03-27 NOTE — PLAN OF CARE
Problem: Goal Outcome Summary  Goal: Goal Outcome Summary  Outcome: No Change  Reason for Admission: Adult failure to thrive [R62.7]  Dehydration [E86.0]  Depression, unspecified depression type [F32.9]  Pertinent Medical History: A fib, DM2, HTN, multiple cancers     Plans for DC: pt from [facility] back when stable.     DO NOT REMOVE ANY FIELDS BELOW - TYPE N/A OR WNL IF NO INFO TO ADD  Procedure/POD: none  Neuro/Psych/Sleep: Pt disoriented to time & situation. Extremely quiet, slow to respond.   CV/Tele/Abnormal VS: Hx of Afib, tele not ordered.  Resp: Diminished  Skin/Wound: WDL, pale  /GI/Diet: Reg diet, decreased appetite. Ballard patent, good output. Loose BM(Cdiff negative)   IV: R PIV, infusing NS @ 100mL/hr.  Pain: Pain at tip of penis, declined any interventions offered.   Chemo/Radiation: n/a  Abnormal Labs/Glucose: BS checks @0200 315 no coverage ordered  Activity/Safety: Up A2/GB/Walker. Unsteady, needs encouragement for transfers from bed to chair or commode  Consults: Psych, palliative, social work  Education: Call light.

## 2017-03-27 NOTE — PLAN OF CARE
Problem: Goal Outcome Summary  Goal: Goal Outcome Summary  Outcome: No Change  Reason for Admission: Adult failure to thrive [R62.7]  Dehydration [E86.0]  Depression, unspecified depression type [F32.9]  Pertinent Medical History: A fib, DM2, HTN, multiple cancers     Plans for DC: pt from [facility] back when stable.     DO NOT REMOVE ANY FIELDS BELOW - TYPE N/A OR WNL IF NO INFO TO ADD  Procedure/POD: none  Neuro/Psych/Sleep: Pt disoriented to time. Extremely quiet, slow to respond.   CV/Tele/Abnormal VS: Hx of Afib, tele not ordered.  Resp: Diminished  Skin/Wound: WDL, pale, cool extremities.  /GI/Diet: Reg diet, decreased appetite. Ballard patent, good output. Loose BM(Cdiff negative)  IV: R PIV, infusing NS @ 100mL/hr.  Pain: Pain at tip of penis, declined any interventions offered.   Chemo/Radiation: n/a  Abnormal Labs/Glucose: BS checks, SSI and additional 2 Units with meals  Activity/Safety: Up A2/GB/Walker. Unsteady, needs encouragement for transfers from bed to chair or commode.   Consults: Psych, palliative, social work  Education: Call light.

## 2017-03-27 NOTE — PROGRESS NOTES
[facility]  Hospitalist Progress Note          Assessment and Plan:   Mr Reginald Riley is a 76-year-old male with past medical history of ampullary cancer, AML, hypertension and slow transit constipation as well as gastroparesis history admitted from long-term care facility for failure to thrive.      Failure to thrive:  - per daughter, this sounds like this has been a prolonged drawn out issue since his initial Whipple procedure 04/2015 Due to ampullary cancer. Patient previously evaluated by Palliative Care during a hospitalization at [facility]. He has previously seen Psychiatry, but has been resistant to pharmacotherapy in the past. He has a POLST form from his long-term care facility stating that he is full code. Briefly discussed options with the family. Currently, no reversible illness inside of infection or other acute abnormality. We discussed possibilities including psychiatry consultation for possible pharmacotherapy of depression. The patient reports that he is interested in this and this will be ordered. Additionally, discuss palliative care consult in case more of a hospice type approach is preferred for patient. Family is very much interested in this. Patient's daughter notes that the patient's brother who is also an additional power of  will be arriving from [place] tomorrow. In the interim, we will hydrate gently with IV fluids. Will consult social work to assist with discharge planning.      Major depression:  - resistant to pharmacotherapy in the past.  - appreciate psychiatry consult- started on Wellbutrin  mg bid & Remeron  - recommended neuro consult for poss drug induced parkinsonism, off reglan.     Insulin-dependent type 2 diabetes:   - Prior to admission regimen includes Lantus 10 units at bedtime, Humalog 2 units t.i.d. with meals &  Metformin 1000 mg twice daily.  - BS in 200- 300s on Lantus & ISS & with holding pre meal Humalog & metformin.   - restart PTA  "premeal Humalog 10 U at HS & 2 U AC, continue PTA Lantus & ISS     History of ampullary cancer, status post Whipple procedure in 04/2015 with subsequent gastroparesis and slow transit constipation: Daughter notes that he is not currently followed by Oncology.   - continue with PTA senna- ducosate - 2 pills tid     Atrial fibrillation: The patient is rate controlled without sho blocking agents. Will continue prior to admission Xarelto.      Deep venous thrombosis prophylaxis: Xarelto.       CODE STATUS: Full code.      Disposition: waiting for palliative care consult per pt/family wishes later today or am. Pt's son coming into town today.      Sara Abdullahi MD  Hospitalist               Interval History:   No new symptoms. Pt eating v well. Denies any new c/o, no acute overnight events.              Medications:       buPROPion  100 mg Oral BID w/meals     mirtazapine  7.5 mg Oral At Bedtime     insulin aspart  2 Units Subcutaneous TID w/meals     acetaminophen (TYLENOL) tablet 1,000 mg  1,000 mg Oral TID     amylase-lipase-protease  2 capsule Oral TID w/meals     omeprazole (priLOSEC) CR capsule 20 mg  20 mg Oral QAM     rivaroxaban ANTICOAGULANT  20 mg Oral QPM     senna-docusate  2 tablet Oral TID     insulin aspart  1-7 Units Subcutaneous TID AC     insulin aspart  1-5 Units Subcutaneous At Bedtime     insulin glargine  10 Units Subcutaneous At Bedtime     sodium chloride (PF)  3 mL Intracatheter Q8H     amylase-lipase-protease, polyethylene glycol, naloxone, acetaminophen, acetaminophen, ondansetron **OR** ondansetron, glucose **OR** dextrose **OR** glucagon, sodium chloride (PF)               Physical Exam:   Blood pressure 123/53, pulse 74, temperature 96.5  F (35.8  C), temperature source Oral, resp. rate 18, height 1.74 m (5' 8.5\"), weight 76 kg (167 lb 8.8 oz), SpO2 96 %.  Wt Readings from Last 4 Encounters:   03/27/17 76 kg (167 lb 8.8 oz)         Vital Sign Ranges  Temperature Temp  Avg: " 96.2  F (35.7  C)  Min: 95.4  F (35.2  C)  Max: 96.7  F (35.9  C)   Blood pressure Systolic (24hrs), Av , Min:99 , Max:123        Diastolic (24hrs), Av, Min:32, Max:63      Pulse Pulse  Av  Min: 74  Max: 74   Respirations Resp  Av.5  Min: 16  Max: 20   Pulse oximetry SpO2  Av.5 %  Min: 95 %  Max: 99 %         Intake/Output Summary (Last 24 hours) at 17 1338  Last data filed at 17 0649   Gross per 24 hour   Intake             2874 ml   Output              900 ml   Net             1974 ml       Constitutional: Awake, alert, cooperative, no apparent distress   Lungs: Clear to auscultation bilaterally, no crackles or wheezing   Cardiovascular: Regular rate and rhythm, normal S1 and S2, and no murmur noted   Abdomen: Normal bowel sounds, soft, non-distended, non-tender   Skin: No rashes, no cyanosis, no edema   Neuro:                Data:     Lab Results   Component Value Date     (H) 2017     (H) 2017

## 2017-03-27 NOTE — PROGRESS NOTES
CLINICAL NUTRITION SERVICES  -  ASSESSMENT NOTE      Recommendations Ordered by Registered Dietitian (RD):   Ensure Plus Shakes PO BID between meals (10am and 2pm)   Malnutrition: Non severe in the context of environmental/social circumstances as well as acute illness.         REASON FOR ASSESSMENT  Reginald Riley is a 76 year old male seen by Registered Dietitian for Admission Nutrition Risk Screen - reduced oral intake over the last month      NUTRITION HISTORY  - Information obtained from EMR. Pt fairly somnolent and not verbally answering questions upon interview (no family present). He shakes his head when asked if he has utilized dietary supplements in the past.     Per review of notes; pt has generally had a gradual decline since Whipple procedure 4/2015. Needed a FT initially and has continued with slow transit constipation and gastroparesis. DKA at [facility] ~ 6 weeks ago, appears to have had poor motivation and outlook since then (psych following). Also with poor PO intake that got progressively worse when moved to [facility] ~ 10 days ago, per H&P's history obtained from daughter.    Pt also with history of ampullary cancer, HTN, T2DM and failure to thrive.      CURRENT NUTRITION ORDERS  Diet Order:     Regular Diet    Current Intake/Tolerance:  50-75% of two meals yesterday      PHYSICAL FINDINGS  Observed  Muscle Wasting in face, very mild  Obtained from Chart/Interdisciplinary Team  None noted    ANTHROPOMETRICS  Height: 6'1 (per OSH records)  Weight: 167 lbs 8.79 oz (76 kg)  BMI: 22.1 kg/m2  Weight Status:  Normal BMI  IBW: 83.6 kg  % IBW: 91%  Weight History: Per review of OSH, baseline weight appears to be ~77 kg. Unable to confirm this with patient or family, however.    LABS  Labs reviewed    MEDICATIONS  Medications reviewed    Creon   Senokot  Reglan  Novolog medium SSI  Lantus 10U HS    NaCl @ 100mL/h    Dosing Weight 74 kg (lowest weight since admission)    ASSESSED NUTRITION NEEDS:  Estimated  Energy Needs: 8124-8438 kcals (30-35 Kcal/Kg)  Justification: repletion  Estimated Protein Needs:  grams protein (1.2-1.5 g pro/Kg)  Justification: Repletion and preservation of lean body mass  Estimated Fluid Needs: 5522-8252  mL (1 mL/Kcal)  Justification: maintenance    MALNUTRITION:  % Weight Loss:  Up to 5% in 1 month (non-severe malnutrition)  % Intake:  Decreased intake does not meet criteria for malnutrition (unable to quantify intake)  Subcutaneous Fat Loss:  None observed  Muscle Loss:  Temporal region mild depletion  Fluid Retention:  None noted    Malnutrition Diagnosis: Non-Severe malnutrition  In Context of:  Environmental or social circumstances, acute illness    NUTRITION DIAGNOSIS:  Inadequate oral intake related to refusing meals as evidenced by 3.8% weight loss, history of poor intake and mild muscle loss.       NUTRITION INTERVENTIONS  Recommendations / Nutrition Prescription  Ensure Plus Shakes BID - pt has no flavor preference, will send variety. Pt nods head in agreement with plan.    .  Implementation  Nutrition education: Not appropriate at this time due to patient condition  Medical Food Supplement  .      Nutrition Goals  Pt will consume at least 75% of meals TID and 100% of supplements BID.   .      MONITORING AND EVALUATION:  Progress towards goals will be monitored and evaluated per protocol and Practice Guidelines      Alyson Hui RD, LD  Clinical Dietitian

## 2017-03-27 NOTE — PLAN OF CARE
Problem: Goal Outcome Summary  Goal: Goal Outcome Summary  Outcome: Improving  Reason for Admission: Adult failure to thrive [R62.7]  Dehydration [E86.0]  Depression, unspecified depression type [F32.9]  Pertinent Medical History: A fib, DM2, HTN, multiple cancers     Plans for DC: pt from [facility] back when stable.     DO NOT REMOVE ANY FIELDS BELOW - TYPE N/A OR WNL IF NO INFO TO ADD  Procedure/POD: none  Neuro/Psych/Sleep: A/O, pleasant, thankful and cooperative today  CV/Tele/Abnormal VS: Hx of Afib, tele not ordered.  Resp: Diminished  Skin/Wound: WDL, pale  /GI/Diet: Reg diet, decreased appetite. Ballard dc'd, due to void. Loose BM(Cdiff negative)   IV: R PIV, S/L today  Pain: denies  Chemo/Radiation: n/a  Abnormal Labs/Glucose: BG with ss insulin  Activity/Safety: Up A1/GB/Walker. Unsteady, needs encouragement for transfers from bed to chair or commode  Consults: Psych, palliative, social work  Education: Call light.

## 2017-03-28 NOTE — PLAN OF CARE
Problem: Goal Outcome Summary  Goal: Goal Outcome Summary  Outcome: No Change  Pt alert. Disoriented to place/situation. Flat affect. Slightly hypertensive, other VSS. Denies pain. Up A1/GB/Walker. Up to chair for meals. Repo q2hrs while in bed. Poor appetite during day, refused to get up for lunch. BG checked, SSI given. Ballard removed yesterday. Straight cathed x2 since removal. BS at 1200 for 200mL. Notify when BS > 650mL. Plan for palliative meeting this afternoon.

## 2017-03-28 NOTE — PROGRESS NOTES
Spiritual Assessment Progress Note  [facility]      PRIMARY FOCUS:      Goals of care    Symptom/pain management    Emotional/spiritual/Yazidism distress    Support for coping    ILLNESS CIRCUMSTANCES:    Reviewed documentation. Reflective conversation shared with pt's daughter, brother and son which integrated elements of illness and family narratives.         Context of Serious Illness/Symptom(s) - after discussion with the palliative team, the family is going to try a regimen of meds to see if they can bring their dad out of the depression.  According to Svitlana pt daughter, pt's whole personality changed after a Whipple procedure 2 years ago. Pt also has broken up with former wife and according to daughter, is devastated about this. More recently, he has stopped eating, taking his meds and sleeps all day.  The family was concerned about what would happen if pt decided he would not take depression meds.  Palliative doc talked about options for the family.  Pt was not present for meeting and according to , very hard to arouse.  Daughter took one of my cards and said she thought she would like to talk at some time in the future.  She is struggling with how her dad's personality has changed.    Resources for Support - daughterSvitlana seems to have emotional support available.  I'm not sure about the pt's brother and son.      DISTRESS:      Emotional/ ExistentialRelational Distress - all family members have seen this depression and it's very stressful for them to watch it happen.    Spiritual/Faith Distress - not discussed    Social/Cultural/EconomicDistress - not discussed.       SPIRITUAL/Yarsanism (Coping):      Quaker/Elvia - Pt identifies as Confucianist but not sure if Oriental orthodox or spirituality is important to him.  Not sure if Oriental orthodox is important to family.    Spiritual Practice(s) - not discussed    Emotional/Existential/ Relationall/Connections - not discussed      GOALS OF CARE:    Goals of Care -  trying meds for depression to see if they help    Meaning/Sense-Making - how would pt define quality of life if given the chance?      PLAN:  is available for pt and family.      Karely Mai  Chaplain Resident  Pager [phone]

## 2017-03-28 NOTE — PROVIDER NOTIFICATION
MD Notification    Notified Person:  MD    Notified Persons Name: Dr. Beal    Notification Date/Time: 3/28/2017, 12:30    Notification Interaction:  Talked with Physician    Purpose of Notification: Elevated BP     Orders Received: PRN dosing ordered for systolic >180. Continue to monitor.     Comments:

## 2017-03-28 NOTE — CONSULTS
[facility]  Palliative Care Consultation    Reginald Riley MRN# [id number]   Age: 76 year old YOB: 1940   Date of Admission: 3/25/2017      Reason for consult: Goals of care  Patient and family support       Requesting physician/service: HERMINIO Smart, PAC, hospitalist       Recommendations/Orders Written        1)  Return to [facility] on continued care  2)  Continue antidepressant medications, if pt agrees  3)  Pt to reconnect with prior therapist, Dr. Apple at [facility] & associated psychiatrist for medication management  4)  See GI specialist at [facility] for follow-up of alternatives for management of GI dysmotility  - Reglan should not be restarted  5)  Code status should be readdressed when pt capable  6)  Hospice transition should be considered if FTT/depression issues not reversible or pt refuses to continue rx for latter.     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     Support/Coping   We ask each of our patients the following questions:    How do you make sense of this? can't    Are you Bahai? Spiritual? Not so much? Church, uncertain importance    Are you at peace? Does not appear to be.    What are your concerns, medical and non-medical, that are not being addressed? Won't engage for  "discussion    Who are your \"go-to\" people when you need support? family    Plan for psychosocial/spiritual support/follow-up: as above     Decision-Making & Goals of Care     Discussion/counseling today about prognosis/goals of care/decisions:   75 minute conversation with family about all of above.  Daughter Svitlana, son Bharat, youngest & only close brother Humza who is primary POA per family report.  Svitlana closer than Bharat to pt.  Reviewed medical hx & life issues.  Pt has recovered from 3 prior CA before ampullary CA dx/surgery.  Apparently expressed to family \"why did I  have 4 cancers??\"   twice with most recent divorce prior to last CA due to financial issues.  Lost all to ex-wife & was homeless for time.  Ex-wife briefly took back into home for rent but neglected his care when he became ill with CA before dx.  Pt distraught by relationship/losses & saw therapist, Dr. Apple at [facility] a few months after surgery with \"good response\" per family.  Then worse as life-style & illness issues evolved to move pt from independent to fully assisted care at current facility.  Pt reports not feeling \"belongs\" there.  Has just escaped into sleep for much time for many months & no interest in anything.  Was reportedly \"very social & liked to cook, eat out, entertain\" in prior life.  Reviewed with family that pt did not have medical illness other than FTT that would take his life & that if he agreed to engage in depression management/grief/loss counseling, he may have a different choice.  Advised that he also has a choice to refuse such treatment even though his emotional state cloud his judgment.  They are aware if he continues to refuse nutrition/treatment, then hospice care at facility would be appropriate to consider.  Review GI dysmotility issues & potential adverse reaction of Reglan on pt leading to discontinuation.  Advised other potential medications that could be helpful & recommended reevaluation by current GI " provider for same.  Code status will need to be reviewed, pending decided course of care.  Patient has a completed health care directive available in the chart (Y/N): No  Health care agent (only if patient has an available, complete HCD): legally son, daughter but family reports directive available with brother Humza as POA & Svitlana, dtr as alternative  There is no POLST (Physician orders for life-sustaining treatment) form on file for this patient but reportedly one from [facility] advising full code 2 yrs ago prior to surgery.  Code Status: Full code   Patient has decision-making capacity (Y/N): limited judgment per psych    Coordination of Care     Findings & plan of care discussed with: unit CM & hospitalist  Follow-up plan from palliative team: revisit tomorrow if pt not discharged    Thank you for the opportunity to participate in the care of this patient and family.      Palliative Physician Attestation:  Total time spent:   90   Minutes, with > 50% in counseling and coordination of care.  Myra Hatfield MD; pager [phone]       Chief Complaint     Doesn't voice         History of Present Illness     History obtained from:chart review, pt as possible, family    75 yo WM admitted from SNF with adult FTT after 3 hospitalizations over last month related to chronic medical issues.  Pt had ampullatory CA dxed & resected with Whipple's procedure 4/15 with complications & poor recovery/decline since.  Went from independent to [facility] living situation with full care over last 2 years.  Recent Afib with RVR controlled now with medication & anticoagulated. BS regulation problems over last mo due to reduced intake.   Chronic GI dysmotility on Reglan PTA.  Discontinued due to question of medication -related Parkinson's syndrome.  Mild malnutrition with no desire to eat.  Illness -related depression per Psych started on medication yesterday.  Drowsy today from same.    Family care conference with son/dtr with  conclusions/POC as above.          Past Medical History:   I have reviewed this patient's past medical history and commented on sigificant events within the HPI           Past Surgical History:   I have reviewed this patient's past surgical history and commented on sigificant events within the HPI            Social/Spiritual History:     Living situation: recently in SNF  Family system:son & daughter  Functional status (needs help with ADLs or IADLs): yes  Employment/education: some college;  Construction industry as /vendor  Use of community resources: SNF  Activities/interests: none  History of substance use/abuse:  Reformed smoker            Family History:   I have reviewed this patient's family history           Allergies:   Allergies   Allergen Reactions     Compazine [Prochlorperazine]      Hmg-Coa-R Inhibitors      Isopropyl Alcohol               Medications:   I have reviewed this patient's medication profile and medications during this hospitalization  Remeron 7.5 & buproprion 100 mg BID started by psychiatry yesterday; Reglan d/c           Review of Systems:   The comprehensive review of systems is negative other than noted here and in the HPI.    Palliative Symptom Review (0=no symptom/no concern, 1=mild, 2=moderate, 3=severe):      Pain:0      Fatigue: 1-2      Nausea: 0      Constipation: 0      Diarrhea: 0      Depressive Symptoms: yes but doesn't rate      Anxiety: 0      Drowsiness: 2      Poor Appetite: 3      Shortness of Breath: 0      Insomnia: 0      Other: 0      Overall (0 good/no concerns, 3 very poor):  Won't express            Physical Exam:   Vitals were reviewed  Temp: 95.2  F (35.1  C) Temp src: Axillary BP: 172/57 Pulse: 68 Heart Rate: 72 Resp: 18 SpO2: 97 % O2 Device: None (Room air)    Constitutional:   Drowsy but awakes, briefly smiles & will engage with body language or soft speech.  NAD     Lungs:   No increased work of breathing, good air exchange, clear to  auscultation bilaterally, no crackles or wheezing     Cardiovascular:   Irreg/irreg RR controlled     Abdomen:   Tympanic, hypoactive normal BS, surgical scar well-healed upper abd, nontender     Musculoskeletal:   1-2+edema          Delirium Screen/CAM:  Delirium = (#1 and #2 = YES) + (#3 and/or #4)       Delirium/CAM score  0/4  Interpretation:  Delirium: not present          Data Reviewed:   Alb 3.1, normal renal function, Hbg A1C 6.8, normal CBA, ECG controlled a fib, CXR negative

## 2017-03-28 NOTE — PLAN OF CARE
Problem: Goal Outcome Summary  Goal: Goal Outcome Summary  Outcome: No Change  VSS and denies pain.  A&Ox4.  Soft spoken.  Up with A of 1, walker, and gait belt.  Tolerating regular diet with good appetite.  and 345, SSI coverage provided.  Ballard catheter d/c'd today at 1300 and has been unable to void.  Bladder scan for 620cc and straight cathed for 575cc.  Plan to d/c back to [facility] when plan in place after son arrives from out of town and after seen by Palliative.  Continue to monitor closely.

## 2017-03-28 NOTE — PLAN OF CARE
Problem: Goal Outcome Summary  Goal: Goal Outcome Summary  Outcome: No Change  Pt slept most of the night. Alert, oriented to self only w/ flat affect. VSS on RA, denies pain/SOB. 2 AM . Ballard catheter removed yesterday. Straight cathed x2 since removal, DTV. BS at 2:50 AM for 84mL. BS again at 6:00 AM for 605, straight cathed 525 mL. Plan to d/c back to [facility] when plan in place after son arrives from out of town and after seen by Palliative. Will continue to monitor.

## 2017-03-28 NOTE — PROGRESS NOTES
[facility]  Hospitalist Progress Note  Zohra Beal MD  03/28/2017    Assessment & Plan   Reginald Riley is a 76-year-old male with past medical history of ampullary cancer, AML, hypertension and slow transit constipation as well as gastroparesis history admitted from long-term care facility for failure to thrive.      Failure to thrive:  - per daughter, this sounds like this has been a prolonged drawn out issue since his initial Whipple procedure 04/2015 Due to ampullary cancer. Patient previously evaluated by Palliative Care during a hospitalization at [facility]. He has previously seen Psychiatry, but has been resistant to pharmacotherapy in the past. He has a POLST form from his long-term care facility stating that he is full code. Currently, no reversible illness inside of infection or other acute abnormality.   - evaluated by psych and initiated on meds as below  - seen by neuro on 3/28, difficult to assess parkinson'ssymptoms due to multiple factors and recommend outpatient eval once acute  Issues are stable  - care conference scheduled with palliative care later today     Major depression:  - resistant to pharmacotherapy in the past.  - appreciate psychiatry consult- started on Wellbutrin  mg bid & Remeron     Insulin-dependent type 2 diabetes:   - Prior to admission regimen includes Lantus 10 units at bedtime, Humalog 2 units t.i.d. with meals &  Metformin 1000 mg twice daily.  - continue Lantus 15 units at HS & 2 U humalog AC  - willl start back PTA Metformin     History of ampullary cancer, status post Whipple procedure in 04/2015 with subsequent gastroparesis and slow transit constipation: Daughter notes that he is not currently followed by Oncology.   - continue with PTA senna- ducosate - 2 pills tid     Atrial fibrillation: The patient is rate controlled without sho blocking agents. Will continue prior to admission Xarelto.      Deep venous thrombosis prophylaxis: Xarelto.  "      CODE STATUS: Full code.      Disposition: consult social work to assist with discharge planning.     Interval History   discussed with RN. Patient with minimal PO intake.   He response with brief yes and no questions. Denies pain. Reports he is tired. Does not interact much and has flat affect    -Data reviewed today: I reviewed all new labs and imaging over the last 24 hours. I personally reviewed no images or EKG's today.    Physical Exam   Heart Rate: 110, Blood pressure (!) 160/99, pulse 68, temperature 98.2  F (36.8  C), temperature source Oral, resp. rate 16, height 1.74 m (5' 8.5\"), weight 77.3 kg (170 lb 6.7 oz), SpO2 96 %.  Vitals:    03/26/17 0527 03/27/17 0649 03/28/17 0638   Weight: 74 kg (163 lb 2.3 oz) 76 kg (167 lb 8.8 oz) 77.3 kg (170 lb 6.7 oz)     Vital Signs with Ranges  Temp:  [96  F (35.6  C)-98.2  F (36.8  C)] 98.2  F (36.8  C)  Pulse:  [68] 68  Heart Rate:  [] 110  Resp:  [16-18] 16  BP: (103-160)/(45-99) 160/99  SpO2:  [96 %-99 %] 96 %  I/O's Last 24 hours  I/O last 3 completed shifts:  In: 480 [P.O.:480]  Out: 1550 [Urine:1550]    Constitutional: Alert, awake and no apparent distress   Respiratory: Clear to auscultation bilaterally  Cardiovascular: Regular rate and rhythm  GI: soft, non-tender  Ext: trace to 1+  LE edema bilaterally  Other: flat affect,    Medications   All medications I am responsible for were reviewed.       insulin glargine  15 Units Subcutaneous At Bedtime     buPROPion  100 mg Oral BID w/meals     mirtazapine  7.5 mg Oral At Bedtime     insulin aspart  2 Units Subcutaneous TID w/meals     acetaminophen (TYLENOL) tablet 1,000 mg  1,000 mg Oral TID     amylase-lipase-protease  2 capsule Oral TID w/meals     omeprazole (priLOSEC) CR capsule 20 mg  20 mg Oral QAM     rivaroxaban ANTICOAGULANT  20 mg Oral QPM     senna-docusate  2 tablet Oral TID     insulin aspart  1-7 Units Subcutaneous TID AC     insulin aspart  1-5 Units Subcutaneous At Bedtime     sodium " chloride (PF)  3 mL Intracatheter Q8H        Data     Recent Labs  Lab 03/26/17  0717 03/25/17  1745   WBC  --  11.6*   HGB  --  13.0*   MCV  --  87   PLT  --  276    139   POTASSIUM 4.3 4.6   CHLORIDE 105 102   CO2 28 28   BUN 25 34*   CR 0.78 1.06   ANIONGAP 7 9   EMELIA 9.1 9.6   * 246*   ALBUMIN  --  3.1*   PROTTOTAL  --  6.8   BILITOTAL  --  0.3   ALKPHOS  --  60   ALT  --  20   AST  --  11   TROPI  --  0.018       No results found for this or any previous visit (from the past 24 hour(s)).

## 2017-03-29 PROBLEM — R62.7 FAILURE TO THRIVE IN ADULT: Status: ACTIVE | Noted: 2017-01-01

## 2017-03-29 PROBLEM — F32.A DEPRESSION: Status: ACTIVE | Noted: 2017-01-01

## 2017-03-29 PROBLEM — G20.C PARKINSONISM (H): Status: ACTIVE | Noted: 2017-01-01

## 2017-03-29 NOTE — DISCHARGE SUMMARY
[facility]    Discharge Summary  Hospitalist    Date of Admission:  3/25/2017  Date of Discharge:  4/4/2017  Discharging Provider: Korey Andrews  Date of Service (when I saw the patient): 04/4/17    Discharge Diagnoses   Adult failure to thrive  Dehydration  Major depression, active and recurrent  Urinary retention  Type 2 diabetes mellitus without complication, with long-term current use of insulin (H)  History of ampullary cancer, status post Whipple procedure in 04/2015 with subsequent gastroparesis and slow transit constipation  Atrial fibrillation    History of Present Illness   Reginald Riley is a 76-year-old male with past medical history of ampullary cancer, AML, hypertension and slow transit constipation as well as gastroparesis history admitted from [facility] for failure to thrive. See H&P for detail.    Hospital Course   Reginald Riley was admitted on 3/25/2017. Reginald Riley is a 76-year-old male with past medical history of ampullary cancer, AML, hypertension and slow transit constipation as well as gastroparesis history admitted from [facility] for failure to thrive. The following problems were addressed during his hospitalization:      Failure to thrive  Per daughter, this is a prolonged drawn out issue since his initial Whipple procedure 04/2015 Due to ampullary cancer. Patient previously evaluated by Palliative Care during a hospitalization at [facility] but did not qualify. He has previously seen Psychiatry, but has been resistant to pharmacotherapy in the past. Currently, no reversible illness such as infection or other acute abnormality that would explain his failure to thrive. He was evaluated by psychiatry and initiated on medications this stay which he now agrees to take (see below.) Palliative care has also evaluated patient and had family conference with his daughter and brother.   - plan is to have him follow up with his therapist and  psychiatrist at [facility] for further management including titration of psychotropic medications. Patient is in agreement with the above plan.   - Per palliative care, he currently does not meet criteria for hospice, however hospice should be considered if depression issue not reversible or pt refuses to continue medication/treatment.   - Neurology also evaluated for possible parkinson symptoms, however unable to assess due to multiple factors, thus recommend f/u as outpatient when acute issues stabilize.       Major depression:  - resistant to pharmacotherapy in the past. Patient currently agrees to take medications.   - appreciate psychiatry consult- started on Wellbutrin  mg twice daily & Remeron this stay  - will schedule f/u appt with his prior therapist and psychiatrist prior to discharge which patient agrees to follow up       Urinary retention:  Has required multiple straight cath due to urinary retention, as a Result garcia catheter has been placed  - recommend voiding trial in 1-2 weeks at U, if continues to have issue, would benefit from urology referral  - also started Flomax daily this stay      Type II diabetes:   -resumed home lantus 10 units daily  -discontinued metformin this stay to avoid hypoglycemia with insulin when he chooses not to eat  -meal aspart changed this stay to 1 unit per carb unit three times daily to try to better match insulin to what he eats since it is variable  -follow up with TCU physician and PCP for ongoing management      History of ampullary cancer, status post Whipple procedure in 04/2015 with subsequent gastroparesis and slow transit constipation: Daughter notes that he is not currently followed by Oncology.   - continue with PTA senna- docusate - 2 pills three times daily  - reglan stopped due to prior intolerance  - GI follow up as outpatient for his gastroparesis as arranged and ordered in follow up section      Atrial fibrillation: The patient is rate controlled  without sho blocking agents.   - Will continue prior to admission Xarelto.     Significant Results and Procedures   TSH normal, UA negative, C. Diff negative, urine culture negative, blood culture negative, chest x-ray negative    Pending Results   None    GENERAL: NAD.   HEENT: Normocephalic. Nares normal.   LUNGS: No dyspnea at rest.   HEART: Extremities perfused.   NEUROLOGIC: Moves extremities x4     Code Status   Full Code       Primary Care Physician   None on file, will follow with TCU physician    Discharge Disposition   Discharged to long-term care facility  Condition at discharge: Fair    Consultations This Hospital Stay   SOCIAL WORK IP CONSULT  PSYCHIATRY IP CONSULT  PALLIATIVE CARE ADULT IP CONSULT  NEUROLOGY IP CONSULT  PHYSICAL THERAPY ADULT IP CONSULT  OCCUPATIONAL THERAPY ADULT IP CONSULT    Discharge Orders     General info for SNF   Length of Stay Estimate: Long Term Care  Condition at Discharge: Stable  Level of care:skilled   Rehabilitation Potential: Fair  Admission H&P remains valid and up-to-date: Yes  Recent Chemotherapy: N/A  Use Nursing Home Standing Orders: Yes     Mantoux instructions   Give two-step Mantoux (PPD) Per Facility Policy Yes     Glucose monitor nursing POCT   Before meals and at bedtime     Ballard catheter   To straight gravity drainage. Ballard removal in 1-2 weeks to asses ability to void on his own. If still issue with urinary retention, consider urology referral.  If the patient would like a referral to a Urology provider [facility] referral line is:  Central Appointment #: [phone]  Located at: [address] [place] [address], [address]  [place]. [address]     Follow Up and recommended labs and tests   Follow up with Nursing home physician.  No follow up labs or test are needed.  You are scheduled for an initial visit with Dr. Safia Apple on Thursday April 5th at 1 p.m. She will be able to give recommendations for a Psychiatrist at that time  Located at:  [facility]  Close  [address] [place] [address]  Phone: [phone]    You are scheduled for a [place] [facility] appointment to discuss Dysmotility medications with Physicians Assistant Martinez on April 12tha t 10:20 a.m. Located at:  [facility]   [address] [place] [address],  S[address]: [address] (Clinic) / [address] (Endoscopy Center)    [place], [place] [address]   [place]  Phone: [phone]     Activity - Up with nursing assistance     Full Code     Physical Therapy Adult Consult   Evaluate and treat as clinically indicated.    Reason:  deconditioning     Occupational Therapy Adult Consult   Evaluate and treat as clinically indicated.    Reason:  deconditioning     Advance Diet as Tolerated   Follow this diet upon discharge: Regular       Discharge Medications   Current Discharge Medication List      START taking these medications    Details   buPROPion (WELLBUTRIN SR) 100 MG 12 hr tablet Take 1 tablet (100 mg) by mouth 2 times daily (with meals)  Qty: 60 tablet    Associated Diagnoses: Depression, unspecified depression type      mirtazapine (REMERON) 7.5 MG TABS tablet Take 1 tablet (7.5 mg) by mouth At Bedtime  Qty: 30 tablet    Associated Diagnoses: Depression, unspecified depression type      tamsulosin (FLOMAX) 0.4 MG capsule Take 1 capsule (0.4 mg) by mouth daily  Qty: 30 capsule    Associated Diagnoses: Urinary retention         CONTINUE these medications which have CHANGED    Details   insulin lispro (HUMALOG KWIKPEN) 100 UNIT/ML injection 1 unit per carb unit with breakfast, lunch and dinner (wait to see how much he eats and then give appropriate coverage)    Associated Diagnoses: Type 2 diabetes mellitus without complication, with long-term current use of insulin (H)         CONTINUE these medications which have NOT CHANGED    Details   Prochlorperazine Maleate (COMPAZINE PO) Take 5 mg by mouth every 6 hours as needed for nausea       !! amylase-lipase-protease (CREON 12) 21739 UNITS CPEP Take 1 capsule by mouth Take with snacks or supplements Give at 20:00      !! amylase-lipase-protease (CREON 12) 68352 UNITS CPEP Take 2 capsules by mouth 3 times daily (with meals) Take at 08:00, 12:00, 17:00      ACETAMINOPHEN PO Take 1,000 mg by mouth 3 times daily For shoulder pain.      bisacodyl (DULCOLAX) 10 MG Suppository Place 10 mg rectally daily as needed for constipation      insulin glargine (LANTUS) 100 UNIT/ML injection Inject 10 Units Subcutaneous At Bedtime      Omeprazole (PRILOSEC PO) Take 20 mg by mouth every morning      RIVAROXABAN PO Take 20 mg by mouth daily      senna-docusate (SENOKOT-S;PERICOLACE) 8.6-50 MG per tablet Take 2 tablets by mouth 3 times daily      polyethylene glycol (MIRALAX/GLYCOLAX) Packet Take 17 g by mouth daily as needed for constipation       !! - Potential duplicate medications found. Please discuss with provider.      STOP taking these medications       METFORMIN HCL PO Comments:   Reason for Stopping:         Metoclopramide HCl (REGLAN PO) Comments:   Reason for Stopping:             Allergies   Allergies   Allergen Reactions     Compazine [Prochlorperazine]      Hmg-Coa-R Inhibitors      Isopropyl Alcohol      Data   Most Recent 3 CBC's:  Recent Labs   Lab Test  03/25/17   1745   WBC  11.6*   HGB  13.0*   MCV  87   PLT  276      Most Recent 3 BMP's:  Recent Labs   Lab Test  04/01/17   0708  03/26/17   0717  03/25/17   1745   NA   --   140  139   POTASSIUM   --   4.3  4.6   CHLORIDE   --   105  102   CO2   --   28  28   BUN   --   25  34*   CR  0.66  0.78  1.06   ANIONGAP   --   7  9   EMELIA   --   9.1  9.6   GLC   --   273*  246*     Most Recent 2 LFT's:  Recent Labs   Lab Test  03/25/17   1745   AST  11   ALT  20   ALKPHOS  60   BILITOTAL  0.3     Most Recent INR's and Anticoagulation Dosing History:  Anticoagulation Dose History     There is no flowsheet data to display.        Most Recent 3  Troponin's:  Recent Labs   Lab Test  03/25/17   1745   TROPI  0.018     Most Recent Cholesterol Panel:No lab results found.  Most Recent 6 Bacteria Isolates From Any Culture (See EPIC Reports for Culture Details):  Recent Labs   Lab Test  03/25/17 2015 03/25/17   1839  03/25/17   1758   CULT  No growth  No growth  No growth     Most Recent TSH, T4 and A1c Labs:  Recent Labs   Lab Test  03/28/17   1210  03/26/17   0717   TSH  0.76   --    A1C   --   6.8*     Results for orders placed or performed during the hospital encounter of 03/25/17   XR Chest 2 Views    Narrative    CHEST TWO VIEW   3/25/2017 6:15 PM     HISTORY: Chest pain, shortness of breath.    COMPARISON: None.      Impression    IMPRESSION: Questionable small pleural effusions seen only on the  lateral. Heart size and pulmonary vasculature are normal. No  infiltrates. Degenerative changes noted in right shoulder.    JEVON SMITH MD

## 2017-03-29 NOTE — PLAN OF CARE
Disoriented to place/sitaution. Flat affect. /86, decreased to 121/55 with prn hydralazine. Refused many medications and repositioning in the afternoon, repositioned q2 hrs in evening. Assist x2 with walker and GB. Bottom reddened. Up in chair for dinner, tolerated 75%. Denies pain. Bladder scan at 1700 for 769 mL, attempted to void and only able to go 100 mL. Blood sugars 304, 211 on SSI.     1740- Contact Dr. Beal regarding urinary retention and order to place and keep garcia in place.

## 2017-03-29 NOTE — PROGRESS NOTES
[facility]  Progress Note  [facility]/Palliative    Pt was sitting up in chair when I came to follow up.  Pt said it was fine to sit down and visit.  He was hardly verbal though he did say several times that he didn't know me.  He also thought he was in [place] when I asked him where he was.  I asked him how he was feeling and he said his stomach hurt.  When I asked him how he was feeling emotionally, he didn't answer.  He did have several different expressions on his face but wasn't able to verblize what he was feeling.  When I left, he said he would see me later.  SH will follow him as long as he is here.      Karely Mai  Chaplain Resident  Pager [phone]

## 2017-03-29 NOTE — PROGRESS NOTES
[facility]  Palliative Care Daily Progress Note      Recommendations, Patient/Family Counseling & Coordination     Continue current discharge plan of care - pt appears willing to try to improve     Myra Hatfield MD, Palliative Medicine Physician    Palliative Physician Attestation:  Total time spent:   25   Minutes, with > 50% in counseling and coordination of care.  Myra Hatfield MD; pager [phone]     Thank you for the opportunity to participate in the care of this patient and family.      Assessment       Diagnoses & symptoms:        1) Adult FTT - mild malnutrition  2) Clinical depression related to prolonged illness recovery, unresolved grief/loss related to failed relationships, loss of home, etc, & elements of essential distress over recurrent life-threatening illnesses - responding to current therapy.  Appears to agree to restorative trial of same.  3) Hx ampullary CA s/p curative resection with Whipple 4/15, Difficult recovery with progressive decline  4) DM2, insulin rxed   5) Gastric dysmotility disorder likely 2o #4/3 - FU OP GI specialist  6) A fib - controlled, anticoagulation  7) Frequent rehospitalizations  8) ? Mild Parkinsonian syndrome due to medication (Reglan) - resolved  9) Prior hx of 3 other CA (AML,prostate, melanoma) inactive at this time.        2)  Psychosocial/Spiritual Needs:   Ongoing pt/family support              Interval History:   Pt much better emotionally today with some good intake for breakfast but still weak & debilitated.  Happily engaged in conversation.  Explain issue of severe depression likely causing him not to want to eat/drink/engage & that currently on medication to improve.  He was please & said felt better.  Discussed that many life events related to same & that continued medication & re-engaging in therapy with prior counselor would be needed for continued improvement in life outlook & physical improvement.  He agreed he would  be willing to do so with hopes of getting better.  Called daughter to inform her.  Advised that can't return to prior facility so discharge pending new placement.           Review of Systems:   Palliative Symptom Review (0=no symptom/no concern, 1=mild, 2=moderate, 3=severe):      Pain: 0      Fatigue: 1      Nausea: 0      Constipation: 0      Diarrhea: 0      Depressive Symptoms: 2      Anxiety: 0      Drowsiness: 1      Poor Appetite: 2      Shortness of Breath: 0      Insomnia: 0      Other: 0      Overall (0 good/no concerns, 3 very poor):  1            Medications:   I have reviewed this patient's medication profile and medications given in past 24 hours.  Remeron, Wellbutrin           Physical Exam:   Vitals were reviewed  Temp: 98.3  F (36.8  C) Temp src: Oral BP: 127/77   Heart Rate: 78 Resp: 16 SpO2: 97 % O2 Device: None (Room air)    Constitutional:   Awake, alert, smiled, took my hand & engaged in conversation well today.  NAD              Data Reviewed:   NA

## 2017-03-29 NOTE — PROGRESS NOTES
Called patients daughter Svitlana regarding d/c planning. Patient is currently an A2 pivot from bed to chair.  Svitlana has asked JACKIE to call [facility] to see if they are able to take him back with current d/c plan.  She informed me that both her and her brother or uncle would transport the patient. Awaiting update from JACKIE.  -Scheduled the patient for f/up Therapist with referral to Psychiatry, Urology referral, [place] [facility] appointment all in AVS with Svitlana's permission.    Addendum: JACKIE informed that [facility] unable to take the patient and TCU referrals have been placed.    Will continue to follow for further d/c planning needs.

## 2017-03-29 NOTE — PLAN OF CARE
Problem: Goal Outcome Summary  Goal: Goal Outcome Summary  Outcome: No Change  Pt alert. Oriented to self. Flat affect. VSS on RA, denies pain. LS diminished. Up A2/GB/walker. Ballard patent w/ good output. Incont of stool x1. Good intake for breakfast. Refused to get OOB for lunch. BG checked, SSI given in AM. Pt will not be accepted back to [facility] - per family's request would like to find a TCU bed with assist of JACKIE.

## 2017-03-29 NOTE — PLAN OF CARE
Problem: Goal Outcome Summary  Goal: Goal Outcome Summary  Outcome: No Change  Pt slept most of the night. Alert, oriented to self only w/ flat affect. VSS on RA, denies pain/SOB. 2 AM . Ballard patent w/ 150mL UOP. Plan to d/c back to [facility] when plan in place. Will continue to monitor.

## 2017-03-29 NOTE — PROGRESS NOTES
JACKIE  I: JACKIE called [facility] board and Care, where patient was residing prior to hospitalization. JACKIE left message for RN to discuss patient's condition and to ensure if they are able to meet the patient's needs. Patient's family will be able to transport patient at d/c. JACKIE will update patient's daughter once RN contacts SW to confirm patient is able to d/c back to facility.    P: SW will continue to follow and assist as needed.    ADDENDUM  I: JACKIE spoke with staff at [facility] and they stated that patient cannot d/c back to facility as they do not have staff to assist patient as he is an assist of 2. JACKIE called [facility] LTC/TCU and they have no beds for patient. JACKIE called patient's daughter and discussed situation and attempted to get choices from family for d/c facility. JACKIE suggested LTC may be most appropriate bed at this time as patient was not working therapy, family would like choices sent to TCU beds not LTC. Patient's daughter informed JACKIE that her uncle is the POA and wouild like to talk with him about this situation. Daughter left message on JACKIE VM stating she would like SW to look at facilities in the [facility]/[place] area. JACKIE called daughter back to give care options web site, JACKIE left message. SW awaits a call back.      ADDENDUM  I: Patient's daughter would like a referral sent to [facility] and [facility]. JACKIE sent referral via DOD.    ADDENDUM  I: [facility] is unable to accept patient due to not participating in therapies and not meeting criteria. JACKIE will await a response from Thelma Marcus.    Desire Hogan, AMARI   *[phone]

## 2017-03-30 NOTE — PLAN OF CARE
Problem: Goal Outcome Summary  Goal: Goal Outcome Summary  Outcome: No Change  No major changes overnight. Lethargic, oriented to self only w/ flat affect. VSS on RA, denies pain/SOB. 2 AM . Ballard patent w/ 300 mL UOP. Plan: DC pending TCU placement, SW following. Will continue to monitor.

## 2017-03-30 NOTE — PLAN OF CARE
Problem: Goal Outcome Summary  Goal: Goal Outcome Summary  Outcome: No Change  Pt in chair today.  Up with gait belt/walker and assistance.  Ambulated to bathroom today.  Eating fair.  Flat affect.  Little verbal.  Ballard in place, draining well.  Covered for blood sugars.  SWS looking for TCU and pt could discharge.

## 2017-03-30 NOTE — PROGRESS NOTES
[facility]  Hospitalist Progress Note  Zohra Beal MD  03/30/2017    Assessment & Plan   Reginald Riley is a 76-year-old male with past medical history of ampullary cancer, AML, hypertension and slow transit constipation as well as gastroparesis history admitted from long-term care facility for failure to thrive.      Failure to thrive  Per daughter, this sounds like this has been a prolonged drawn out issue since his initial Whipple procedure 04/2015 Due to ampullary cancer. Patient previously evaluated by Palliative Care during a hospitalization at [facility]. He has previously seen Psychiatry, but has been resistant to pharmacotherapy in the past. He has a POLST form from his long-term care facility stating that he is full code. Currently, no reversible illness inside of infection or other acute abnormality that would explain. He was evaluated by psychiatry and initiated on medications (see below.) Palliative care has also evaluated patient and had family conference with his daughter and brother.   - plan is to have him follow up with his therapist and psychiatrist at [facility] for further management including titration of psychotropic medications. Patient is in agreement with the above plan.   - Per palliative care, he currently does not meet criteria for hospice, however hospice should be considered if depression issue not reversible or pt refuses to continue medication/treatment.  - Neurology also evaluated for possible parkinson symptoms, however unable to assess due to multiple factors, thus recommend f/u as outpatient when acute issues stabilize.      Major depression:  - resistant to pharmacotherapy in the past. Patient currently agrees to take medications.   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   - continue with PTA senna- ducosate - 2 pills tid  - reglan stopped due to prior intolerance  - GI follow up as outpatient for his gatroperesis      Atrial fibrillation: The patient is rate controlled without sho blocking agents.   - Will continue prior to admission Xarelto.     DVT Prophylaxis: xarelto  Code Status: full    Disposition: ready for discharge, awaiting placement    Interval History   C/o some penile pain which he feels he injured when moving. No hemautria in the catheter. Otherwise, minimal interactive with flat affect.     -Data reviewed today: I reviewed all new labs and imaging over the last 24 hours. I personally reviewed no images or EKG's today.    Physical Exam   Heart Rate: 75, Blood pressure 96/42, pulse 75, temperature 97.9  F (36.6  C), temperature source Oral, resp. rate 16, height 1.74 m (5' 8.5\"), weight 81.2 kg (179 lb 1.6 oz), SpO2 97 %.  Vitals:    03/28/17 0638 03/29/17 0500 03/30/17 0550   Weight: 77.3 kg (170 lb 6.7 oz) 78.4 kg (172 lb 13.5 oz) 81.2 kg (179 lb 1.6 oz)     Vital Signs with Ranges  Temp:  [96.9  F (36.1  C)-98.5  F (36.9  C)] 97.9  F (36.6  C)  Pulse:  [75] 75  Heart Rate:  [62-77] 75  Resp:  [16] 16  BP: ()/(41-61) 96/42  SpO2:  [96 %-97 %] 97 %  I/O's Last 24 hours  I/O last 3 completed shifts:  In: 120 [P.O.:120]  Out: 625 [Urine:625]    Constitutional: Alert, awake and no apparent distress   Respiratory: Clear to auscultation " bilaterally  Cardiovascular: Regular rate and rhythm  GI: soft, non-tender      Medications   All medications I am responsible for were reviewed.       insulin glargine  15 Units Subcutaneous At Bedtime     buPROPion  100 mg Oral BID w/meals     mirtazapine  7.5 mg Oral At Bedtime     insulin aspart  2 Units Subcutaneous TID w/meals     acetaminophen (TYLENOL) tablet 1,000 mg  1,000 mg Oral TID     amylase-lipase-protease  2 capsule Oral TID w/meals     omeprazole (priLOSEC) CR capsule 20 mg  20 mg Oral QAM     rivaroxaban ANTICOAGULANT  20 mg Oral QPM     senna-docusate  2 tablet Oral TID     insulin aspart  1-7 Units Subcutaneous TID AC     insulin aspart  1-5 Units Subcutaneous At Bedtime     sodium chloride (PF)  3 mL Intracatheter Q8H        Data     Recent Labs  Lab 03/26/17  0717 03/25/17  1745   WBC  --  11.6*   HGB  --  13.0*   MCV  --  87   PLT  --  276    139   POTASSIUM 4.3 4.6   CHLORIDE 105 102   CO2 28 28   BUN 25 34*   CR 0.78 1.06   ANIONGAP 7 9   EMELIA 9.1 9.6   * 246*   ALBUMIN  --  3.1*   PROTTOTAL  --  6.8   BILITOTAL  --  0.3   ALKPHOS  --  60   ALT  --  20   AST  --  11   TROPI  --  0.018       No results found for this or any previous visit (from the past 24 hour(s)).

## 2017-03-30 NOTE — PLAN OF CARE
"Problem: Goal Outcome Summary  Goal: Goal Outcome Summary  Outcome: No Change  Reason for Admission:   Adult failure to thrive [R62.7]  Dehydration [E86.0]  Depression, unspecified depression type [F32.9]  Pertinent Medical History:   A fib, DM2, HTN, multiple cancers     Plans for DC: [facility] not being [facility] back to [facility]. Per family request would like to find TCU bed - SW assisting.      DO NOT REMOVE ANY FIELDS BELOW - TYPE N/A OR WNL IF NO INFO TO ADD  Procedure/POD: none  Neuro/Psych/Sleep: Oriented to self only. Flat affect, refusing HS meds, lethargic and slept most of shift  CV/Tele/Abnormal: HR irregular, Hx of Afib  VS: /51 (BP Location: Left arm)  Pulse 68  Temp 96.3  F (35.7  C) (Oral)  Resp 16  Ht 1.74 m (5' 8.5\")  Wt 78.4 kg (172 lb 13.5 oz)  SpO2 99%  BMI 25.9 kg/m2  Resp: LS diminished  Skin/Wound: Pale, blanchable redness on coccyx, Turn/repo q2hrs. Up to chair for supper.  /GI/Diet: Reg diet, fair appetite. Ballard patent, low urine output, MD notified. Incontinent of stool during day shift. No BM on eves  Pain: Denies  Chemo/Radiation: N/A  Abnormal Labs/Glucose: BG checks, HS BG elevated.  Activity/Safety: Up A2/GB/Walker. Unsteady, increasingly weak, recommend using commode.  Consults: Psych, Palliative, Neurology, SW.  Education: Turn and reposition, medication reviewed.        "

## 2017-03-30 NOTE — PROGRESS NOTES
03/30/17 1418   Quick Adds   Type of Visit Initial PT Evaluation   Living Environment   Lives With facility resident   Living Arrangements residential facility   Living Environment Comment Per chart, pt was living at [facility] with room and board   Self-Care   Usual Activity Tolerance moderate   Current Activity Tolerance moderate   Equipment Currently Used at Home none   Functional Level Prior   Ambulation 0-->independent   Transferring 0-->independent   Toileting 0-->independent   Bathing 0-->independent   Dressing 0-->independent   Fall history within last six months no   Which of the above functional risks had a recent onset or change? ambulation;transferring   Prior Functional Level Comment pt reports he was IND with mobility and ADLs prior to this admission   General Information   Onset of Illness/Injury or Date of Surgery - Date 03/25/17   Referring Physician Zohra Beal MD   Patient/Family Goals Statement none specifically stated   Pertinent History of Current Problem (include personal factors and/or comorbidities that impact the POC) pt  is a 76-year-old male with past medical history of ampullary cancer, AML, hypertension and slow transit constipation as well as gastroparesis history admitted from long-term care facility for failure to thrive   Precautions/Limitations no known precautions/limitations   General Info Comments in bed, verbalizes minmally   Cognitive Status Examination   Orientation orientation to person, place and time   Level of Consciousness alert   Follows Commands and Answers Questions 75% of the time   Personal Safety and Judgment impaired   Memory impaired   Cognitive Comment Difficult to fully assess cognition as pt resistant to conversation and responds mostly only with head nods   Posture    Posture Forward head position;Protracted shoulders;Kyphosis   Range of Motion (ROM)   ROM Comment WFL in BLE   Strength   Strength Comments WFL in BLE as observed through  "functional mobility   Bed Mobility   Bed Mobility Comments min A for BLE supine <> sit with HOB elevated   Transfer Skills   Transfer Comments min A x1, CGA x2nd person for safety sit <> stand with FWW   Gait   Gait Comments CGA x2 with FWW to amb x35'; signficantly flexed forward posture, pushed walker too far in front, not overly receptive to cues at times   Balance   Balance Comments Fair, no overt LOB but pt demonstrating at risk for fall behaviors with decreased walker safety, flexed forward posture   Sensory Examination   Sensory Perception Comments denies N/T   Clinical Impression   Criteria for Skilled Therapeutic Intervention evaluation only   PT Diagnosis decreased functional mobility   Influenced by the following impairments weakness   Functional limitations due to impairments bed mobility, transfers, ambulation   Clinical Presentation Stable/Uncomplicated   Clinical Presentation Rationale stable clinical picture this date   Clinical Decision Making (Complexity) Low complexity   Predicted Duration of Therapy Intervention (days/wks) Eval Only   Anticipated Discharge Disposition Transitional Care Facility   Risk & Benefits of therapy have been explained Yes   Patient, Family & other staff in agreement with plan of care Yes   [facility] [employer] TM \"6 Clicks\"   2016, Trustees of [facility], under license to [employer].  All rights reserved.   6 Clicks Short Forms Basic Mobility Inpatient Short Form   [facility] [employer]  \"6 Clicks\" V.2 Basic Mobility Inpatient Short Form   1. Turning from your back to your side while in a flat bed without using bedrails? 3 - A Little   2. Moving from lying on your back to sitting on the side of a flat bed without using bedrails? 3 - A Little   3. Moving to and from a bed to a chair (including a wheelchair)? 2 - A Lot   4. Standing up from a chair using your arms (e.g., wheelchair, or bedside chair)? 3 - A Little   5. To walk in hospital room? 3 - A Little "   6. Climbing 3-5 steps with a railing? 1 - Total   Basic Mobility Raw Score (Score out of 24.Lower scores equate to lower levels of function) 15   Total Evaluation Time   Total Evaluation Time (Minutes) 26

## 2017-03-30 NOTE — PROGRESS NOTES
"SWS  D:  SWS following pt for coordination of SNF discharge plan.  TCU vs LTC to be determined by PT assessment later today.  PT is aware pt needs to be seen today.  SW received message from [facility] informing SW pt has already used 100 of his Medicare days and they do not have LTC bed for pt.  P:  SW will need to get more facility choices from pt/family.  Plan will be based on PT recommendation.      Update:  SW spoke with daughter Svitlana by phone.  [facility].  SW explained PT to assess pt this afternoon.  Per daughter report, pt worked minimally with PT while at TCU and she doesn't want pt to go to TCU for hospital to \"get rid of him\".  She does not want pt placed at multiple facilities.  Daughter agreed with referral being sent to [facility], [facility], and [facility]-referrals sent by DOD process.      Update:  [facility] has no open bed for pt,they may have openings next week.  [facility] [place] due to past behaviors and refusing meds.  [facility] will assess pt and let SW know tomorrow.  At this time, PT recommends TCU as pt has potential and desire to improve and become stronger.  Daughter prefers pt discharge to a facility where he can eventually transition into LTC as she does not want him placed at multiple facilities.  SW left message for daughter requesting more facility choices.  "

## 2017-03-30 NOTE — PROGRESS NOTES
MD Notification    Notified Person:  MD    Notified Persons Name: Dr. Thayer    Notification Date/Time: 3/29 2230    Notification Interaction:  Talked with Physician    Purpose of Notification: Low urine output    Orders Received: No change to POC at this time, will continue to push fluids    Comments:

## 2017-03-30 NOTE — PROGRESS NOTES
SPIRITUAL HEALTH SERVICES Progress Note  [facility]    Met with pt, per palliative consult.   Pt was sitting up in bed, reading the paper, when I arrived.  He spoke softly and his affect was quite flat.  Pt said that he's having great pain and discomfort due to his catheter.  He requested that I speak with his nurse to see if he can get some relief.  I offered to pray with him, and he quite willingly accepted my offer of prayer.  Pt seemed grateful for my spiritual support, and I then shared our conversation with the nurse so that she could speak with him about his physical needs.  SH team will be available if further needs arise.                                                                                                                                                 Archana Vieyra  Staff   Pager [phone]

## 2017-03-30 NOTE — PLAN OF CARE
"Problem: Goal Outcome Summary  Goal: Goal Outcome Summary  PT: Orders received, eval completed.  Pt admitted with failure to thrive.  He reports that he was IND with mobility and ADLs at baseline at [place], where he was residing prior to this admission.  Currently, pt needing min A for supine <> sit with BLE, min A x1 and CGA x2nd person for sit <> stand with FWW; amb x35' with FWW and CGA x2 for safety.  Pt needing frequent cues for upright posture with ambulation as pt demonstrates significantly flexed posture, cues to keep the walker closer to him, and to scan environment for objects.     Pt demonstrating decreased strength, impaired balance, decreased activity tolerance, and poor safety awareness that would benefit being addressed from further skilled PT intervention.  When asked this date, pt stated \"I am willing to work with therapy to get stronger.\"  Recommend TCU to further address needs.      "

## 2017-03-31 NOTE — PROGRESS NOTES
[facility] Palliative Progress Note - Brief:      All goals of Palliative Care consult met.  Pt is stable on current medication with plan in place for TCU discharge when placement available.  GOC clear.  Will sign off.    Thank you for the opportunity to participate in the care of this patient and family.    Myra Hatfield MD, Palliative Medicine Physsician

## 2017-03-31 NOTE — PLAN OF CARE
Problem: Goal Outcome Summary  Goal: Goal Outcome Summary  Outcome: No Change   Alert to self with flat affect. Pt VS, soft BP's. Denies pain. IV saline locked. Ballard patent with adequate urine output. Tolerating reg diet, ordered dinner. Repositioned q2 hrs. Will continue to monitor.

## 2017-03-31 NOTE — PLAN OF CARE
Problem: Goal Outcome Summary  Goal: Goal Outcome Summary  Outcome: No Change  [id number]  Somnolent most shift. Ox4. VSS, RA. CMS intact. Regular diet. Reposition in bed. Elio patent. Denies pain. Possible d/c tomorrow pending placement. Will continue to monitor.

## 2017-03-31 NOTE — PROGRESS NOTES
"Social Work Progress Note     D:   SW and CC met with pt's daughter to discuss d/c. Pt's daughter was not pleased that some LTC locations only have private rooms which would require an additional amount for her to pay out of pocket per day. Pt's daughter also states she will not \"just send him anywhere\" and refuses pt to go to a facility that is not highly ranked. SW explained that due to pt's Observation status and limited availability in LTC beds (LTC beds are more difficult to find and some are only private, however she cannot pay for private room and insurance will not cover the fee) that we will need to expand our search to beyond her top 1-5 choices. Pt's daughter was upset with this information and feels that the hospital should \"have more pull\" than she or a non-hospital employee would have in finding an appropriate LTC bed. SW agreed that we cannot force pt to go to a LTC facility, so perhaps we should explore other options. Patient's daughter reports she cannot take pt home as she cannot adequately care for him and she refuses TCU placement. SW reiterated that we will then need to expand our LTC search to other facilities.    JACKIE recapped to dtr regarding the referral updates: No bed at [facility] as they do not take MSH[facility]; no beds at [facility] or [facility] as they only have privates and pt cannot pay for private and his MSHO does not cover private; no LTC or TCU bed at [facility]; no bed at [facility] (they may have openings next week); no bed at [facility] as they declined pt due to past behaviors and refusing meds.     JACKIE then offered to send referrals to all LTC facilities in the [facility] and pt could discharge to the first available bed; pt's daughter stated \"I refuse to do that\". JACKIE again asked for LTC facility choices and pt's daughter was upset, stating she needed more time to research them. SW explained we need to sent " referrals by EOD. Pt's daughter then expressed interest in: [facility] (they are only Board and Care- so referral not sent), [facility] (referral already sent- waiting to hear back), [facility], [facility]. SW also suggested [facility] and [facility] as these appeared to have double rooms on their LTC unit. Pt's daughter was agreeable to referrals being sent to the above facilities, which JACKIE did via Discharge on the Double.        P: Will continue to follow for support and d/c planning.      MATILDE Solis SW  [phone]

## 2017-03-31 NOTE — PROGRESS NOTES
Social Work Progress Note     D:   SW following for d/c planning. Per previous SW note, pt's daughter has requested referrals be sent to TCUs that also have LTC options for patient to hopefully transition into. PT and OT have recommended TCU. There was not an appropriate bed [facility] and [facility] (may have bed next week). Pt is ready for discharge.  [facility] is still assessing pt.     A/I: SW received voicemail from pt's dtr early this morning, requesting referrals be sent to the following facilities: [facility], [facility], [facility], [facility], [facility] and [facility]. SW sent referrals to these TCUs via Discharge on the Double.     P: JACKIE awaits response from TCUs.       MATILDE Solis  *[phone]            Social Work Progress Note- ADDENDUM    D: Suraj at [facility] states they do not have any MA beds; they only have private rooms which are $70/day fee not covered by patient's MA. Celia at [facility] states their long term facility only has private rooms with a $35/day fee not covered by patient's MA.     JACKIE left message for [facility] (ph:[phone]).    JACKIE called pt's daughter, Svitlana (ph:[phone]) and updated her on the above. Pt's daughter states that she is only interested in Long Term Care and she states that the MD is only recommending LTC and therefore pt should not go to a TCU. Pt's daughter states pt is not appropriate for TCU. JACKIE explained that referrals were sent to facilities for both LTC and TCU beds, as per chart review, pt has been referred to both.    JACKIE inquired if pt's daughter would be able to pay for private room fees (anywhere from between $35-70 per night) and she confirms she cannot pay for this.     P: JACKIE awaits response from TC/LTC.       MATILDE Solis  *[phone]

## 2017-03-31 NOTE — PLAN OF CARE
Problem: Goal Outcome Summary  Goal: Goal Outcome Summary  Outcome: No Change  VSS. Arouses to voice but does not engage in much conversation. Flat affect. T/R every 2 hours. Elio patent. D/C pending TCU placement.

## 2017-03-31 NOTE — PLAN OF CARE
Problem: Goal Outcome Summary  Goal: Goal Outcome Summary  Outcome: No Change  Pt continues with flat affect but does interact with care givers at times.  Ate well today.  Up in chair for meals.  Ballard in place.  Plan for discharge to TCU once bed available.  Up with 1-2 with gait belt and walker.  MARCI.

## 2017-03-31 NOTE — PROGRESS NOTES
[facility]  Hospitalist Progress Note  Zohra Beal MD  03/31/2017    Assessment & Plan   Reginald Riley is a 76-year-old male with past medical history of ampullary cancer, AML, hypertension and slow transit constipation as well as gastroparesis history admitted from long-term care facility for failure to thrive.      Failure to thrive  Per daughter, this sounds like this has been a prolonged drawn out issue since his initial Whipple procedure 04/2015 Due to ampullary cancer. Patient previously evaluated by Palliative Care during a hospitalization at [facility]. He has previously seen Psychiatry, but has been resistant to pharmacotherapy in the past. He has a POLST form from his long-term care facility stating that he is full code. Currently, no reversible illness inside of infection or other acute abnormality that would explain. He was evaluated by psychiatry and initiated on medications (see below.) Palliative care has also evaluated patient and had family conference with his daughter and brother.   - plan is to have him follow up with his therapist and psychiatrist at [facility] for further management including titration of psychotropic medications. Patient is in agreement with the above plan.   - Per palliative care, he currently does not meet criteria for hospice, however hospice should be considered if depression issue not reversible or pt refuses to continue medication/treatment.    - Neurology also evaluated for possible parkinson symptoms, however unable to assess due to multiple factors, thus recommend f/u as outpatient when acute issues stabilize.      Major depression:  - resistant to pharmacotherapy in the past. Patient currently agrees to take medications.   - appreciate psychiatry consult- started on Wellbutrin  mg bid & Remeron   - will schedule f/u appt with his prior therapist and psychiatrist prior to discharge which patient agrees to follow up      Urinary retention:  Has  "required multiple straight cath due to urinary retention, as a Result garcia catheter has been placed  - recommend voiding trial in 1-2 weeks at TCU, if continues to have issue, would benefit from urology referral  - also started Flomax daily      Type II diabetes:   - AM BG 87 this am  - decrease Lantus to PTA dose of 15 units qhs  - continue with Aspart 2units TID w/meals  - resume metformin at discharge.       History of ampullary cancer, status post Whipple procedure in 04/2015 with subsequent gastroparesis and slow transit constipation: Daughter notes that he is not currently followed by Oncology.   - continue with PTA senna- ducosate - 2 pills tid  - reglan stopped due to prior intolerance  - GI follow up as outpatient for his gatroperesis      Atrial fibrillation: The patient is rate controlled without sho blocking agents.   - Will continue prior to admission Xarelto.     DVT Prophylaxis: xarelto  Code Status: full    Disposition: ready for discharge, awaiting placement    Interval History   No acute  Issues. Awaiting for placement.   Flat affect. Minimal interactive.     -Data reviewed today: I reviewed all new labs and imaging over the last 24 hours. I personally reviewed no images or EKG's today.    Physical Exam   Heart Rate: 114, Blood pressure 115/57, pulse 75, temperature 96.9  F (36.1  C), temperature source Oral, resp. rate 16, height 1.74 m (5' 8.5\"), weight 81.2 kg (179 lb 1.6 oz), SpO2 94 %.  Vitals:    03/28/17 0638 03/29/17 0500 03/30/17 0550   Weight: 77.3 kg (170 lb 6.7 oz) 78.4 kg (172 lb 13.5 oz) 81.2 kg (179 lb 1.6 oz)     Vital Signs with Ranges  Temp:  [96.9  F (36.1  C)-97.9  F (36.6  C)] 96.9  F (36.1  C)  Pulse:  [75] 75  Heart Rate:  [] 114  Resp:  [15-16] 16  BP: ()/(42-57) 115/57  SpO2:  [94 %-97 %] 94 %  I/O's Last 24 hours  I/O last 3 completed shifts:  In: 360 [P.O.:360]  Out: 700 [Urine:700]    Constitutional: sleeping, easily arouses and responds to " questions  Respiratory: Clear to auscultation bilaterally  Cardiovascular: Regular rate and rhythm  GI: soft, non-tender      Medications   All medications I am responsible for were reviewed.       tamsulosin  0.4 mg Oral Daily     insulin glargine  15 Units Subcutaneous At Bedtime     buPROPion  100 mg Oral BID w/meals     mirtazapine  7.5 mg Oral At Bedtime     insulin aspart  2 Units Subcutaneous TID w/meals     acetaminophen (TYLENOL) tablet 1,000 mg  1,000 mg Oral TID     amylase-lipase-protease  2 capsule Oral TID w/meals     omeprazole (priLOSEC) CR capsule 20 mg  20 mg Oral QAM     rivaroxaban ANTICOAGULANT  20 mg Oral QPM     senna-docusate  2 tablet Oral TID     insulin aspart  1-7 Units Subcutaneous TID AC     insulin aspart  1-5 Units Subcutaneous At Bedtime     sodium chloride (PF)  3 mL Intracatheter Q8H        Data     Recent Labs  Lab 03/26/17  0717 03/25/17  1745   WBC  --  11.6*   HGB  --  13.0*   MCV  --  87   PLT  --  276    139   POTASSIUM 4.3 4.6   CHLORIDE 105 102   CO2 28 28   BUN 25 34*   CR 0.78 1.06   ANIONGAP 7 9   EMELIA 9.1 9.6   * 246*   ALBUMIN  --  3.1*   PROTTOTAL  --  6.8   BILITOTAL  --  0.3   ALKPHOS  --  60   ALT  --  20   AST  --  11   TROPI  --  0.018       No results found for this or any previous visit (from the past 24 hour(s)).

## 2017-04-01 NOTE — PROGRESS NOTES
[facility]  Progress Note  [facility]    Stopped by to see if pt was up for a visit.  He was lying in bed and had little to no response to me.  I did offer a prayer for him and let him know that  would continue to visit with him.        Karely Mai  Chaplain Resident  Pager [phone]

## 2017-04-01 NOTE — PLAN OF CARE
Problem: Goal Outcome Summary  Goal: Goal Outcome Summary  Outcome: No Change  VSS. Denies pain. A&O, flat affect. Ax2+GB/walker, denied any activity this shift. Good appetite, /331 this shift. Ballard patent w/ adequate UO. Plan for dc to TCU pending placement. Will continue to monitor.

## 2017-04-01 NOTE — PLAN OF CARE
Problem: Goal Outcome Summary  Goal: Goal Outcome Summary  Outcome: No Change  Reason for Admission: Adult failure to thrive [R62.7]  Dehydration [E86.0]  Depression, unspecified depression type [F32.9]  Pertinent Medical History:  A fib, DM2, HTN, multiple cancers     Plans for DC: pending TCU placement     DO NOT REMOVE ANY FIELDS BELOW - TYPE N/A OR WNL IF NO INFO TO ADD  Procedure/POD: n/a  Neuro/Psych/Sleep: A&O to self, flat affect, cooperative  CV/Tele/Abnormal VS: WNL on RA  Resp: Anterior diminshed  Skin/Wound: pale, T/R Q2  /GI/Diet: Ballard patent, GI concave abd, Regular diet  IV: PIV SL  Pain: denies  Chemo/Radiation: n/a  Abnormal Labs/Glucose: 0200   Activity/Safety: bedrest this shift  Consults: completed  Education:pressure relief

## 2017-04-01 NOTE — PROGRESS NOTES
[facility]    Hospitalist Progress Note    Assessment & Plan   Reginald Riley is a 76-year-old male with past medical history of ampullary cancer, AML, hypertension and slow transit constipation as well as gastroparesis history admitted from long-term care facility for failure to thrive.       Failure to thrive  Per daughter, this is a prolonged drawn out issue since his initial Whipple procedure 04/2015 Due to ampullary cancer. Patient previously evaluated by Palliative Care during a hospitalization at [facility] but did not qualify. He has previously seen Psychiatry, but has been resistant to pharmacotherapy in the past. Currently, no reversible illness such as infection or other acute abnormality that would explain his failure to thrive. He was evaluated by psychiatry and initiated on medications this stay which he now agrees to take (see below.) Palliative care has also evaluated patient and had family conference with his daughter and brother.   - plan is to have him follow up with his therapist and psychiatrist at [facility] for further management including titration of psychotropic medications. Patient is in agreement with the above plan.   - Per palliative care, he currently does not meet criteria for hospice, however hospice should be considered if depression issue not reversible or pt refuses to continue medication/treatment.   - Neurology also evaluated for possible parkinson symptoms, however unable to assess due to multiple factors, thus recommend f/u as outpatient when acute issues stabilize.       Major depression:  - resistant to pharmacotherapy in the past. Patient currently agrees to take medications.   - appreciate psychiatry consult- started on Wellbutrin  mg twice daily & Remeron this stay  - will schedule f/u appt with his prior therapist and psychiatrist prior to discharge which patient agrees to follow up       Urinary retention:  Has required multiple straight cath due to  urinary retention, as a Result garcia catheter has been placed  - recommend voiding trial in 1-2 weeks at TCU, if continues to have issue, would benefit from urology referral  - also started Flomax daily this stay      Type II diabetes:   -uncontrolled with hyperglycemia  -4 units NPH x 1 this AM  -increase Lantus to 13 units qhs  -increase meal aspart to 4 units TID (hold if not eating)  -plan to resume metformin at discharge  -changed to high SSI  -monitor glucose for hypoglycemia/toxicity      History of ampullary cancer, status post Whipple procedure in 04/2015 with subsequent gastroparesis and slow transit constipation: Daughter notes that he is not currently followed by Oncology.   - continue with PTA senna- docusate - 2 pills three times daily  - reglan stopped due to prior intolerance  - GI follow up as outpatient for his gastroparesis      Atrial fibrillation: The patient is rate controlled without sho blocking agents.   - Will continue prior to admission Xarelto.      DVT Prophylaxis: xarelto  Code Status: full code     Disposition: ready for discharge, awaiting placement    Korey Andrews MD  [phone] (C)  [phone] (P)  Text Page (7 am to 6 pm)    Interval History   Went on walk this morning but is warn out now and sleeping.  Does arouse and talk to me.  No complaints. Depressed. Looking for placement.    -Data reviewed today: I reviewed all new labs and imaging results over the last 24 hours. I personally reviewed no images or EKG's today.    Physical Exam   Temp: 96.6  F (35.9  C) Temp src: Oral BP: 100/54 Pulse: 67 Heart Rate: 85 Resp: 16 SpO2: 96 % O2 Device: None (Room air)    Vitals:    03/29/17 0500 03/30/17 0550 04/01/17 0646   Weight: 78.4 kg (172 lb 13.5 oz) 81.2 kg (179 lb 1.6 oz) 76.8 kg (169 lb 5 oz)     Vital Signs with Ranges  Temp:  [96.6  F (35.9  C)-97.8  F (36.6  C)] 96.6  F (35.9  C)  Pulse:  [57-67] 67  Heart Rate:  [77-85] 85  Resp:  [16] 16  BP: (100-129)/(52-67)  100/54  SpO2:  [92 %-97 %] 96 %  I/O last 3 completed shifts:  In: 480 [P.O.:480]  Out: 1115 [Urine:1115]    Constitutional: Sleeping but arouses, cooperative, no apparent distress, flat affect  Respiratory: Clear to auscultation bilaterally, no crackles or wheezing  Cardiovascular: Regular rate and rhythm, normal S1 and S2, and no murmur noted  GI: Normal bowel sounds, soft, non-distended, non-tender  Skin/Integumen: No rashes, no cyanosis, 1+ leg edema bilaterally  Other:     Medications        insulin glargine  13 Units Subcutaneous At Bedtime     insulin aspart  4 Units Subcutaneous TID w/meals     insulin aspart  1-10 Units Subcutaneous TID AC     insulin aspart  1-7 Units Subcutaneous At Bedtime     tamsulosin  0.4 mg Oral Daily     buPROPion  100 mg Oral BID w/meals     mirtazapine  7.5 mg Oral At Bedtime     acetaminophen (TYLENOL) tablet 1,000 mg  1,000 mg Oral TID     amylase-lipase-protease  2 capsule Oral TID w/meals     omeprazole (priLOSEC) CR capsule 20 mg  20 mg Oral QAM     rivaroxaban ANTICOAGULANT  20 mg Oral QPM     senna-docusate  2 tablet Oral TID     sodium chloride (PF)  3 mL Intracatheter Q8H       Data     Recent Labs  Lab 04/01/17  0708 03/26/17  0717 03/25/17  1745   WBC  --   --  11.6*   HGB  --   --  13.0*   MCV  --   --  87   PLT  --   --  276   NA  --  140 139   POTASSIUM  --  4.3 4.6   CHLORIDE  --  105 102   CO2  --  28 28   BUN  --  25 34*   CR 0.66 0.78 1.06   ANIONGAP  --  7 9   EMELIA  --  9.1 9.6   GLC  --  273* 246*   ALBUMIN  --   --  3.1*   PROTTOTAL  --   --  6.8   BILITOTAL  --   --  0.3   ALKPHOS  --   --  60   ALT  --   --  20   AST  --   --  11   TROPI  --   --  0.018       No results found for this or any previous visit (from the past 24 hour(s)).

## 2017-04-01 NOTE — PROGRESS NOTES
SWS  D:  SWS following pt for LTC placement.    SW received the following updates from facilities pt info was sent to for review:  [facility]-no appropriate bed for pt  [facility]- admissions staff requests SW  Call back later today  [facility] in [place]- accepting no more admissions this weekend  [facility]- no weekend admissions  [facility]- will assess and review with nursing staff  [facility]- JACKIE left message requesting update  [facility]- still assessing  P:  SW will continue to follow pt for LTC placement.  SW may need to get more choices from daughter.    Update:  JACKIE left message for [facility] admissions to see if admission determination made on pt yet, awaiting call back.  Pt declined at [facility] as they have no open bed in LTC for pt, could check back next week.

## 2017-04-01 NOTE — PLAN OF CARE
Problem: Goal Outcome Summary  Goal: Goal Outcome Summary  Outcome: No Change  Plans for DC: Pending TCU placement, SW working with pt's daughter on placement.     Procedure/POD: n/a  Neuro/Psych/Sleep: A/O to self, flat affect. Sad/tearful at times today.  CV/Tele/Abnormal VS: HR irregular, h/o a-fib   Resp:  LS dim  Skin/Wound: pale, blanchable redness on coccyx. T/R Q2  /GI/Diet: Garcia patent, will d/c w/ garcia. Regular diet, good appetite.   IV: PIV SL  Pain: denies  Chemo/Radiation: n/a  Abnormal Labs/Glucose: , 236 (Changes made to SS and scheduled insulin today)  Activity/Safety: Up to chair for meals, ambulated in gibbons w/ assist of 1-2/walker/GB.  Consults: completed  Education:Medications given, call light, pressure ulcer prevention

## 2017-04-02 NOTE — PROVIDER NOTIFICATION
MD Notification    Notified Person:  MD    Notified Persons Name: Dr. Leos    Notification Date/Time: 4/1 2215    Notification Interaction:  Talked with Physician    Purpose of Notification: low BGs, not eating, lantus due    Orders Received: decrease lantus to 7 unit, change to carb count with meals    Comments:

## 2017-04-02 NOTE — PROVIDER NOTIFICATION
Brief update:      Regarding blood glucose in the 80s range    Patient with significant depression, minimal oral intake    Note Lantus increased to 13 units from 10 units. Blood glucose currently in the 80s at bedtime check. Patient only ate breakfast today, supper yesterday.    Lantus decreased to 7 units    Prandial 4 units with meal insulin discontinued as patient's oral intake is variable and patient has been receiving short acting insulin at mealtime though he is not necessarily eating. This has been converted to 1 unit insulin per carb unit carbohydrate correction scale. Correction scale made need to be uptitrated, though would be hesitant to increase basal insulin significantly given variable intake.    Jethro Leos MD  10:27 PM    Low UOP page;     1 L NS. Pt with poor intake. Has occasionally refused interventions.    Jethro Leos MD  11:37 PM

## 2017-04-02 NOTE — PLAN OF CARE
Problem: Goal Outcome Summary  Goal: Goal Outcome Summary  PT: Contacted by SW to discuss PT recommendations.  Conversation with pt and SW regarding PT recommendations of continued skilled PT intervention as pt is needing more assistance currently than what is his baseline.  Pt seemed engaged in conversation and stated he wanted to work with therapy to get stronger.  Continue to recommend TCU to address skilled PT needs including strengthening, gait training, balance, and safety awareness.

## 2017-04-02 NOTE — PROGRESS NOTES
"SWS  D:  SWS following pt for LTC placement.  I:  SW has been in contact with the following facilities:  [facility]-pt looks appropriate for LTC, however, no open LTC bed.  Admissions will keep pt referral for weekday admissions staff.  [facility]- SW emailed pt referral today as requested as Friday referral info could not be located and fax is down.  [facility]- cannot assess pt for LTC placement until Monday  [facility]- SW left message for admissions, awaiting return call.  P:  SW will call daughter to get more facility choices.    Update:  [facility] cannot review pts for LTC placement on weekends, just TCU.  [facility] will keep pt referral info until tomorrow when admissions can review for LTC.  PT is no longer working with pt due to obs status.  It is still unclear what plan is for pt because at last PT session, pt showed motivation to rehab, however, family indicates preference for LTC placement.  PT and SW to meet with pt together later this afternoon.  SW left message for loy Shane to come to hospital to be part of this discussion or call SW to discuss plan, awaiting call back.    Update at 9534:  SW and PT Chayo met with pt.  SW explained to pt that pt cannot return to [facility] board and care at this time as he is requiring too much staff assistance for board and care unit.  When JACKIE asked if pt liked it at [facility], pt nodded yes and said it was \"well organized\".   When SW asked pt if he was wanting to return to [facility] and is willing to work with therapy, pt indicated yes.  When JACKIE asked pt about his apartment on [address], pt indicated he no longer has the apartment.  Pt also informed SW that his son Bharat lives in [place].  Pt had no further questions for SW and when SW suggested SW would work on pt potential return to [facility] for TCU, pt responded \"good\".  Loy Shane has not been at hospital yet today and has not returned SW call.  SW then spoke to Christ at [facility] " "and explained pt desire to return to [facility]CU so he can eventually return to [facility] and care.  Per Christ, [facility] does not have an open male bed for pt today, but likely will tomorrow and they can accept pt then.  Christ requested updated notes on pt-referral sent by DOD process.  SW will attempt to speak to loy Shane later today about pt's interest in returning to [facility] (TCU).    Update:  Around 1430, son Bharat came to visit pt.  Visit was brief, however, SW explained goal for pt to return to [facility] and care after TCU stay.  Son understands and is in agreement with this plan and will discuss with sister Svitlana.  Son wishes pt showed more interest by watching TV or reading a book \"he just sleeps\".  Pt appears more alert today, has ambulated with staff, and is now taking psych meds:some improvements noted.  SW has not been able to reach loy Shane.  Dr. Andrews is also in agreement with proposed plan.  "

## 2017-04-02 NOTE — PLAN OF CARE
Problem: Goal Outcome Summary  Goal: Goal Outcome Summary  Outcome: No Change  VSS. Flat affect, denies pain. IV infiltrated, arm elevated on pillows. Slept well between cares. Will continue to monitor.

## 2017-04-02 NOTE — PLAN OF CARE
Problem: Goal Outcome Summary  Goal: Goal Outcome Summary  Outcome: Declining  Reason for Admission: Adult failure to thrive [R62.7]  Dehydration [E86.0]  Depression, unspecified depression type [F32.9]  Pertinent Medical History:  A fib, DM2, HTN, multiple cancers     Plans for DC: pending TCU placement     DO NOT REMOVE ANY FIELDS BELOW - TYPE N/A OR WNL IF NO INFO TO ADD  Procedure/POD: n/a  Neuro/Psych/Sleep: A/O to self, flat affect. Sad/tearful at times today.  CV/Tele/Abnormal VS: HR irregular, h/o a-fib   Resp:  LS dim  Skin/Wound: pale, blanchable redness on coccyx. T/R Q2  /GI/Diet: Garcia patent, will d/c w/ garcia, low urine output. Regular diet, ate breakfast but refused lunch and supper.  IV: PIV SL  Pain: denies  Chemo/Radiation: n/a  Abnormal Labs/Glucose: BG 95 and 87, pagexin BAIRD who decreased Lantus and started on carb coverage for meals  Activity/Safety: Up to chair for meals this AM, refused to get out of bed tonight, normally assist of 1/walker/belt  Consults: completed  Education:Medications given, call light, pressure ulcer prevention

## 2017-04-02 NOTE — PROGRESS NOTES
[facility]    Hospitalist Progress Note    Assessment & Plan   Reginald Riley is a 76-year-old male with past medical history of ampullary cancer, AML, hypertension and slow transit constipation as well as gastroparesis history admitted from long-term care facility for failure to thrive.       Failure to thrive  Per daughter, this is a prolonged drawn out issue since his initial Whipple procedure 04/2015 Due to ampullary cancer. Patient previously evaluated by Palliative Care during a hospitalization at [facility] but did not qualify. He has previously seen Psychiatry, but has been resistant to pharmacotherapy in the past. Currently, no reversible illness such as infection or other acute abnormality that would explain his failure to thrive. He was evaluated by psychiatry and initiated on medications this stay which he now agrees to take (see below.) Palliative care has also evaluated patient and had family conference with his daughter and brother.   - plan is to have him follow up with his therapist and psychiatrist at [facility] for further management including titration of psychotropic medications. Patient is in agreement with the above plan.   - Per palliative care, he currently does not meet criteria for hospice, however hospice should be considered if depression issue not reversible or pt refuses to continue medication/treatment.   - Neurology also evaluated for possible parkinson symptoms, however unable to assess due to multiple factors, thus recommend f/u as outpatient when acute issues stabilize.       Major depression:  - resistant to pharmacotherapy in the past. Patient currently agrees to take medications.   - appreciate psychiatry consult- started on Wellbutrin  mg twice daily & Remeron this stay  - will schedule f/u appt with his prior therapist and psychiatrist prior to discharge which patient agrees to follow up       Urinary retention:  Has required multiple straight cath due to  urinary retention, as a Result garcia catheter has been placed  - recommend voiding trial in 1-2 weeks at TCU, if continues to have issue, would benefit from urology referral  - also started Flomax daily this stay      Type II diabetes:   -better controlled today  -decreased Lantus to 7 units qhs  -meal aspart changed to 1 unit per carb unit three times daily to try to better match insulin to what he eats since it is variable  -discontinued metformin this stay to avoid hypoglycemia with insulin when he chooses not to eat  -continue high SSI while in hospital and plan to not use SSI at discharge  -monitor glucose for hypoglycemia/toxicity      History of ampullary cancer, status post Whipple procedure in 04/2015 with subsequent gastroparesis and slow transit constipation: Daughter notes that he is not currently followed by Oncology.   - continue with PTA senna- docusate - 2 pills three times daily  - reglan stopped due to prior intolerance  - GI follow up as outpatient for his gastroparesis as arranged and ordered in follow up section      Atrial fibrillation: The patient is rate controlled without sho blocking agents.   - Will continue prior to admission Xarelto.      DVT Prophylaxis: xarelto  Code Status: full code     Disposition: ready for discharge, discharge orders and summary updated on 4/2; awaiting placement, SW was looking into LTC but none available and now with improving energy and participation we are considering transfer back to TCU on 4/3 with the goal of getting stronger and eventually back to his board and care facility (services somewhere between LTC and assisted living but would need to be more independent to go back there)    Korey Andrews MD  [phone] (C)  [phone] (P)  Text Page (7 am to 6 pm)    Interval History   Went on walk again this morning.  Is getting out to walk with nursing more.  Continues to be observation care and looking into options of TCU versus LTC and SW working  with family.  Does arouse and talk to me.  No complaints. Depressed still with flat affect but does have more energy.    -Data reviewed today: I reviewed all new labs and imaging results over the last 24 hours. I personally reviewed no images or EKG's today.    Physical Exam   Temp: 96.6  F (35.9  C) Temp src: Oral BP: 106/51 Pulse: 69 Heart Rate: 79 Resp: 16 SpO2: 96 % O2 Device: None (Room air)    Vitals:    03/30/17 0550 04/01/17 0646 04/02/17 0558   Weight: 81.2 kg (179 lb 1.6 oz) 76.8 kg (169 lb 5 oz) 80.5 kg (177 lb 7.5 oz)     Vital Signs with Ranges  Temp:  [96  F (35.6  C)-97.2  F (36.2  C)] 96.6  F (35.9  C)  Pulse:  [69] 69  Heart Rate:  [70-79] 79  Resp:  [16-20] 16  BP: (106-128)/(46-59) 106/51  SpO2:  [95 %-98 %] 96 %  I/O last 3 completed shifts:  In: 360 [P.O.:360]  Out: 875 [Urine:875]    Constitutional: Sleeping but arouses, cooperative, no apparent distress, flat affect  Respiratory: Clear to auscultation bilaterally, no crackles or wheezing  Cardiovascular: Regular rate and rhythm, normal S1 and S2, and no murmur noted  GI: Normal bowel sounds, soft, non-distended, non-tender  Skin/Integumen: No rashes, no cyanosis, 1+ leg edema bilaterally  Other:     Medications        insulin aspart  1-10 Units Subcutaneous TID AC     insulin aspart  1-7 Units Subcutaneous At Bedtime     insulin aspart   Subcutaneous TID w/meals     insulin glargine  7 Units Subcutaneous At Bedtime     tamsulosin  0.4 mg Oral Daily     buPROPion  100 mg Oral BID w/meals     mirtazapine  7.5 mg Oral At Bedtime     acetaminophen (TYLENOL) tablet 1,000 mg  1,000 mg Oral TID     amylase-lipase-protease  2 capsule Oral TID w/meals     omeprazole (priLOSEC) CR capsule 20 mg  20 mg Oral QAM     rivaroxaban ANTICOAGULANT  20 mg Oral QPM     senna-docusate  2 tablet Oral TID     sodium chloride (PF)  3 mL Intracatheter Q8H       Data     Recent Labs  Lab 04/01/17  0708   CR 0.66       No results found for this or any previous visit  (from the past 24 hour(s)).

## 2017-04-02 NOTE — PLAN OF CARE
Problem: Goal Outcome Summary  Goal: Goal Outcome Summary  Outcome: Improving  Neuro/Psych/Sleep: A/O to self, flat affect. Sad/tearful at times.  CV/Tele/Abnormal VS: HR irregular, h/o a-fib   Resp:  LS dim  Skin/Wound: pale, blanchable redness on coccyx. T/R Q2 when in bed.  /GI/Diet: Garcia patent, will d/c w/ garcia.  Regular diet, good appetite. Ate 100% of breakfast and 50% of lunch. 2 large BM's, 1 loose.   IV: No IV access.  Right PIV infiltrated overnight, R arm elevated. Denies pain/numbness/tingling.  Pain: denies  Chemo/Radiation: n/a  Abnormal Labs/Glucose: AM BG 71, pt now on carb count insulin w/ meals  Activity/Safety: Improving, up to BSC x2 w/ assist of 1. Refused to sit in chair for meals (tired after getting up to BSC). Ambulates w/ assist of 1/walker/GB.   Consults: completed  Education:Medications, call light, pressure ulcer prevention.     D/C pending placement, SW following. Will continue to monitor and support.

## 2017-04-03 NOTE — PLAN OF CARE
Problem: Goal Outcome Summary  Goal: Goal Outcome Summary  Outcome: No Change  Plans for DC: Pending TCU placement, SW following     Neuro/Psych/Sleep: A/O to self, flat affect. Sad/tearful at times today.  CV/Tele/Abnormal VS: HR irregular  Resp:  LS dim  Skin/Wound: pale, blanchable redness on coccyx. T/R q2hr.  /GI: Garcia patent, will d/c w/ garcia.    Diet: Regular diet, poor appetite this evening.    IV: Lost IV access. MD notified. No IV meds ordered.   Pain: denies  Abnormal Labs/Glucose: GLU checks with meals and at bedtime.   Activity/Safety: up to commode w/ assist of 1/walker/GB.

## 2017-04-03 NOTE — PROGRESS NOTES
CLINICAL NUTRITION SERVICES - REASSESSMENT NOTE    EVALUATION OF PROGRESS TOWARD GOALS   Diet:  Regular  Ensure Plus BID between meals    Intake:  Pt reports eating 50% of most meals. His appetite kind of comes a goes, generally it's not very good. He does like the ensure and consumes most of it. Per RN documentation, pt is eating % of meals.    Lunch today refused.      Dosing Weight 74 kg (lowest weight since admission)     ASSESSED NUTRITION NEEDS:  Estimated Energy Needs: 4382-1708 kcals (30-35 Kcal/Kg)  Justification: repletion  Estimated Protein Needs:  grams protein (1.2-1.5 g pro/Kg)  Justification: Repletion and preservation of lean body mass    Previous Goals:   Pt will consume at least 75% of meals TID and 100% of supplements BID.   Evaluation: Not met    Previous Nutrition Diagnosis:   Inadequate oral intake related to refusing meals as evidenced by 3.8% weight loss, history of poor intake and mild muscle loss.   Evaluation: ongoing, updated    CURRENT NUTRITION DIAGNOSIS  Inadequate oral intake related to decreased appetite as evidenced by % of meals consumed, pt report of poor appetite.     INTERVENTIONS  Recommendations / Nutrition Prescription  Regular diet  Ensure Plus BID  Room service appropriate w/ assist    Implementation  Education: encouraged pt to drink all of his Ensure supplement if having ongoing reduced appetite.     Goals  Pt to consume average of 50% of meals TID + 75% of supps.       MONITORING AND EVALUATION:  Progress towards goals will be monitored and evaluated per protocol and Practice Guidelines    Belén Pagan, ANNA, LD

## 2017-04-03 NOTE — DOWNTIME EVENT NOTE
The EMR was down for 4 hours on 4/2/2017.    Mariposa Tim was responsible for completing the paper charting during this time period.     The following information was re-entered into the system by Mariposa Tim: Medications    The following information will remain in the paper chart: Head to toe assessment, VS, Progress note  Mariposa Tim  4/2/2017

## 2017-04-03 NOTE — PROGRESS NOTES
[facility]  Hospitalist Progress Note        Sanket Hilario,   04/03/2017        Interval History:      Patient working with nursing staff. Pending placement.          Assessment and Plan:        Reginald Riley is a 76-year-old male with past medical history of ampullary cancer, AML, hypertension and slow transit constipation as well as gastroparesis history admitted from long-term care facility for failure to thrive.       Failure to thrive  Per daughter, this is a prolonged drawn out issue since his initial Whipple procedure 04/2015 Due to ampullary cancer. Patient previously evaluated by Palliative Care during a hospitalization at [facility] but did not qualify. He has previously seen Psychiatry, but has been resistant to pharmacotherapy in the past. Currently, no reversible illness such as infection or other acute abnormality that would explain his failure to thrive. He was evaluated by psychiatry and initiated on medications this stay which he now agrees to take (see below.) Palliative care has also evaluated patient and had family conference with his daughter and brother.   - plan is to have him follow up with his therapist and psychiatrist at [facility] for further management including titration of psychotropic medications.   - Per palliative care, he currently does not meet criteria for hospice, however hospice should be considered if depression issue not reversible or pt refuses to continue medication/treatment.   - Neurology also evaluated for possible parkinson symptoms, however unable to assess due to multiple factors, thus recommend f/u as outpatient when acute issues stabilize.       Major depression:  - resistant to pharmacotherapy in the past. Patient currently agrees to take medications.   - appreciate psychiatry consult- started on Wellbutrin  mg twice daily & Remeron this stay  - will schedule f/u appt with his prior therapist and psychiatrist prior to discharge which patient  "agrees to follow up       Urinary retention:  Has required multiple straight cath due to urinary retention, as a Result garcia catheter has been placed  - recommend voiding trial in 1-2 weeks at TCU, if continues to have issue, would benefit from urology referral  - also started Flomax daily this stay      Type II diabetes:   -better controlled   -adjusted Lantus to 10 units qhs  -meal aspart changed to 1 unit per carb unit three times daily to try to better match insulin to what he eats since it is variable  -discontinued metformin this stay to avoid hypoglycemia with insulin when he chooses not to eat  -continue SSI while in hospital and plan to not use SSI at discharge  -monitor glucose for hypoglycemia/toxicity      History of ampullary cancer, status post Whipple procedure in 2015 with subsequent gastroparesis and slow transit constipation: Daughter notes that he is not currently followed by Oncology.   - continue with PTA senna- docusate - 2 pills three times daily  - reglan stopped due to prior intolerance  - GI follow up as outpatient for his gastroparesis as arranged and ordered in follow up section      Atrial fibrillation: The patient is rate controlled without sho blocking agents.   - Will continue prior to admission Xarelto.       DVT Prophylaxis: xarelto  Code Status: full code      Disposition: ready for discharge when verified location.                    Physical Exam:      Heart Rate: 88    Blood pressure 129/55, pulse 69, temperature 96.6  F (35.9  C), temperature source Oral, resp. rate 16, height 1.74 m (5' 8.5\"), weight 76.5 kg (168 lb 10.4 oz), SpO2 97 %.    Vitals:    17 0646 17 0558 17 0236   Weight: 76.8 kg (169 lb 5 oz) 80.5 kg (177 lb 7.5 oz) 76.5 kg (168 lb 10.4 oz)       Vital Sign Ranges  Temperature Temp  Av.8  F (36  C)  Min: 96  F (35.6  C)  Max: 97.8  F (36.6  C)   Blood pressure Systolic (24hrs), Av , Min:102 , Max:138        Diastolic (24hrs), " Av, Min:42, Max:80      Pulse No Data Recorded   Respirations Resp  Av  Min: 16  Max: 16   Pulse oximetry SpO2  Av.2 %  Min: 96 %  Max: 97 %     Vital Signs with Ranges  Temp:  [96  F (35.6  C)-97.8  F (36.6  C)] 96.6  F (35.9  C)  Heart Rate:  [72-92] 88  Resp:  [16] 16  BP: (102-138)/(42-80) 129/55  SpO2:  [96 %-97 %] 97 %    I/O Last 3 Shifts:   I/O last 3 completed shifts:  In: 250 [P.O.:250]  Out: 1250 [Urine:1250]    I/O past 24 hours:     Intake/Output Summary (Last 24 hours) at 17 1600  Last data filed at 17 1400   Gross per 24 hour   Intake              250 ml   Output             1250 ml   Net            -1000 ml     GENERAL: NAD.   HEENT: Normocephalic. Nares normal.   LUNGS: No dyspnea at rest.   HEART: Extremities perfused.   NEUROLOGIC: Moves extremities x4          Prior to Admission Medications:        Prescriptions Prior to Admission   Medication Sig Dispense Refill Last Dose     Prochlorperazine Maleate (COMPAZINE PO) Take 5 mg by mouth every 6 hours as needed for nausea   prn     amylase-lipase-protease (CREON 12) 10937 UNITS CPEP Take 1 capsule by mouth Take with snacks or supplements Give at 20:00   3/24/2017 at 20:00     amylase-lipase-protease (CREON 12) 95305 UNITS CPEP Take 2 capsules by mouth 3 times daily (with meals) Take at 08:00, 12:00, 17:00   3/25/2017 at 12:00     ACETAMINOPHEN PO Take 1,000 mg by mouth 3 times daily For shoulder pain.   3/25/2017 at 12:00     bisacodyl (DULCOLAX) 10 MG Suppository Place 10 mg rectally daily as needed for constipation   prn     insulin glargine (LANTUS) 100 UNIT/ML injection Inject 10 Units Subcutaneous At Bedtime   3/24/2017 at hs     Omeprazole (PRILOSEC PO) Take 20 mg by mouth every morning   3/25/2017 at 07:00     RIVAROXABAN PO Take 20 mg by mouth daily   3/24/2017 at 17:30     senna-docusate (SENOKOT-S;PERICOLACE) 8.6-50 MG per tablet Take 2 tablets by mouth 3 times daily   3/25/2017 at 12:00     polyethylene glycol  (MIRALAX/GLYCOLAX) Packet Take 17 g by mouth daily as needed for constipation   prn     [DISCONTINUED] METFORMIN HCL PO Take 1,000 mg by mouth 2 times daily (with meals)   3/25/2017 at 8:00     [DISCONTINUED] Metoclopramide HCl (REGLAN PO) Take 5 mg by mouth 3 times daily 07:30, 11:30, 17:00   3/25/2017 at 11:30            Medications:        Current Facility-Administered Medications   Medication Last Rate     Current Facility-Administered Medications   Medication Dose Route Frequency     insulin aspart  1-22 Units Subcutaneous TID AC     insulin aspart  1-16 Units Subcutaneous At Bedtime     insulin glargine  10 Units Subcutaneous At Bedtime     insulin aspart   Subcutaneous TID w/meals     tamsulosin  0.4 mg Oral Daily     buPROPion  100 mg Oral BID w/meals     mirtazapine  7.5 mg Oral At Bedtime     acetaminophen (TYLENOL) tablet 1,000 mg  1,000 mg Oral TID     amylase-lipase-protease  2 capsule Oral TID w/meals     omeprazole (priLOSEC) CR capsule 20 mg  20 mg Oral QAM     rivaroxaban ANTICOAGULANT  20 mg Oral QPM     senna-docusate  2 tablet Oral TID     sodium chloride (PF)  3 mL Intracatheter Q8H     Current Facility-Administered Medications   Medication Dose Route Frequency     hydrALAZINE  12.5 mg Oral 4x Daily PRN     amylase-lipase-protease  1 capsule Oral With Snacks or Supplements     polyethylene glycol  17 g Oral Daily PRN     naloxone  0.1-0.4 mg Intravenous Q2 Min PRN     acetaminophen  650 mg Oral Q4H PRN     acetaminophen  650 mg Rectal Q4H PRN     ondansetron  4 mg Oral Q6H PRN    Or     ondansetron  4 mg Intravenous Q6H PRN     glucose  15-30 g Oral Q15 Min PRN    Or     dextrose  25-50 mL Intravenous Q15 Min PRN    Or     glucagon  1 mg Subcutaneous Q15 Min PRN     sodium chloride (PF)  3 mL Intracatheter Q1H PRN            Data:      Lab data reviewed.     Recent Labs  Lab 04/01/17  0708   CR 0.66           Imaging:      Imaging data reviewed.     Dr. Sanket Hilario D.O.  [facility]  Hospitalist  Pager [phone]

## 2017-04-03 NOTE — PROGRESS NOTES
"Social Work Progress Note     D:  JACKIE received voicemail from pt's daughter yesterday, asking SW to send referrals to: the [facility] (referral was sent multiple times- no response yet), [facility] in [place] (referral sent last week), and [facility] in [place]. In her message, Svitlana also stated she was not available to meet or have family conference on Monday or Tuesday as she as working and going to school.     A/I: JACKIE reviewed chart and SW notes and called dtr Svitlana back. Per chart review, pt's son Bharat (ph: [phone]) met with pt, JACKIE Jimenez and PT yesterday. Per their conversation, pt and son were agreeable with pt returning to [facility] TCU then transition back to the board and care unit at [facility]. Pt was alert and ambulating with staff yesterday. JACKIE had sent referral to [facility] at [facility] who stated they did not have an open bed yesterday but would have one today.    JACKIE updated pt's daughter on the above and she replied \"What are you talking about? We don't want a TCU. No one wants a TCU. My brother  made it clear to that lady yesterday that he was only there to visit pt and that he has no decision making power, so going to [facility] TCU is not an option,. We just moved my dad's stuff out of his board and care yesterday\". JACKIE read portions of previous JACKIE note to pt's daughter, stating that both son and pt were okay with plan to return to TCU at [facility]. Pt's daughter responded \"Go ahead and call Bharat. He will agree with anything I say\", and then provided Bharat's phone number to JACKIE. Pt's daughter then stated \"My uncle and I are the co-decision makers\". JACKIE asked if she had paperwork for this and she did not respond. JACKIE asked again if she could provide paperwork for the record and she stated \"Well not today or tomorrow. I'm working today and tomorrow. I cannot fax it or e-mail it\". JACKIE then inquired if Alena/Carie Wright had paperwork and she stated " "she didn't know; pt's daughter stated her uncle may have paperwork.     SW asked pt's dtr if she was avail;able for family conference so all members could be present; she stated she wasn't in the next day or two. She went on to say \"Why do we need another one. We had one yesterday and we are all on the same page.You people are the ones that can't get it right\". SW asked when the family conference was and she replied \"At my house\". SW explained we would need one with pt's treatment team and pt's daughter responded \"We did that last week. Why do we need another one; you can't take good notes? How am I supposed to place my dad at a LTC facility if you can't even do it\".     JACKIE attempted to speak with kevin Jennings at [facility] (ph: [phone]) to inquire if they had POA/HCA paperwork.     JACKIE called and left message for pt's brother, Humza, ([phone]) to request copies of POA/HCA paperwork.    JACKIE updated Care Coordinator.        P: Will continue to follow for support and d/c planning.      MATILDE Solis [facility]  *[phone]            Social Work Progress Note- ADDENDUM     D: Jessica at the [facility] called regarding referral. Jessica states their admissions representative, Jan, will come meet pt today to assess in person.    MATILDE Solis [facility]  *[phone]     "

## 2017-04-03 NOTE — PROGRESS NOTES
"Social Work Progress Note     D:   JACKIE following for discharge planning. James from [facility] performed an assessment with patient this AM. JACKIE then spoke with Jessica, admissions at [facility], who confirms they can accept pt onto their Long Term Care unit as soon as a bed opens up. Jessica confirms that since pt has [employer] insurance, his [place] portion of insurance will cover his room and board at the facility and therefore patient will not have any out-of-pocket expenses. Jessica hopes to know by the end of the day or tomorrow when a bed will open up.    JACKIE left voicemail for pt's daughter, Svitlana, updating her on the above information. Per previous conversations wit pt's daughter and chart review, [facility] was one of her first choices for placement. JACKIE also informed Svitlana via voicemail that SW can arrange for w/c ride which will be covered by pt's [place] insurance, as Svitlana is busy today and tomorrow.     JACKIE updated Care Coordinator and bedside RN.     P: JACKIE awaits return call from Jessica about bed availability.     MATILDE Solis [facility]  *[phone]        Social Work Progress Note- ADDENDUM      D:  Jessica at the [facility] confirms they have a bed available for pt tomorrow. JACKIE updated pt and he is agreeable to this plan, stating \"Okay, so I leave tomorrow? Can you let my brother know?\". JACKIE left voicemail for pt's brother, Humza, regarding discharge plan. JACKIE arranged for w/c ride to [facility] via [employer] for tomorrow at 1045am.     JACKIE updated Care Coordinator, bedside RN and charge RN.     P: SW needs to complete a PAS, per Jessica.     MATILDE Solis [facility]  *[phone]      "

## 2017-04-03 NOTE — PLAN OF CARE
Problem: Goal Outcome Summary  Goal: Goal Outcome Summary  Outcome: No Change  Pt is alert to self.  VSS.  Pt blood sugars were 229 and 250, insulin given per sliding scale.  Assist of 1 with walker.  Regular diet with carb counting for insulin.  Poor appetite this evening.  Pt very withdrawn this evening, refused vital signs and medications at beginning of shift.  Catheter cares preformed.  Ballard patent with good urine output.  Will continue to monitor.

## 2017-04-03 NOTE — PLAN OF CARE
Problem: Goal Outcome Summary  Goal: Goal Outcome Summary  Outcome: Improving  Neuro/Psych/Sleep: A/O to self, flat affect. Sad/tearful at times today.  CV/Tele/Abnormal VS: HR irregular  Resp:  LS dim  Skin/Wound: pale, blanchable redness on coccyx. T/R q2hr.  /GI/Diet: Garcia patent, will d/c w/ garcia.  Regular diet, poor appetite.    IV: Lost IV access. MD notified. No IV meds ordered.   Pain: denies  Chemo/Radiation: n/a  Abnormal Labs/Glucose: GLU checks with meals and at bedtime.   Activity/Safety: up to commode w/ assist of 1/walker/GB.   Consults: completed  Education: Medications, call light, pressure ulcer prevention

## 2017-04-04 NOTE — PLAN OF CARE
Problem: Goal Outcome Summary  Goal: Goal Outcome Summary  Outcome: No Change  Plans for DC: Likely d/c today to [facility]. Ride arranged for 1045. SW following     Neuro/Psych/Sleep: A/O to self, flat affect. Sad/tearful at times.  CV/Tele/Abnormal VS: HR irregular  Resp:  LS dim  Skin/Wound: WNL. T/R q2hr.  /GI/Diet: Garcia patent, will d/c w/ garcia d/t retention.  Regular diet, poor appetite. Experiences nausea with food. Zofran available PRN.  IV: Lost IV access. MD notified. No IV meds ordered.   Pain: denies  Abnormal Labs/Glucose: GLU checks with meals and at bedtime.   Activity/Safety: up to commode w/ assist x1+walker/GB.

## 2017-04-04 NOTE — PROGRESS NOTES
Social Work Progress Note     D:   Discharge orders received for pt. JACKIE received phone call from pt's daughter, Svitlana (she called last evening) who states that she and her uncle (pt's brother Humza) were going to visit the [facility] last night. Svitlana also wanted to know what time the ride was set for today.     A/I: JACKIE returned phone call to Svitlana and left voicemail, stating ride was set for today at 1045a and she could call SW with any questions. JACKIE then spoke with pt's brother Humza, who is agreeable with discharge plan and states either he or pt's daughter will meet pt at the [facility] this morning. Pt's brother then thanked JACKIE for assisting with placement; he denies any questions or concerns at this time.    JACKIE left message for [place] at the [facility], informing her of the transport time today. JACKIE updated HUC, charge RN and bedside RN.     JACKIE faxed orders at [phone]. JACKIE faxed PAS at [phone].     MATILDE [facility]  [phone]      PAS-RR    D: Per DHS regulation, JACKIE completed and submitted PAS-RR to [place] Board on Aging Direct Connect via the Senior LinkAge Line.    PAS-RR confirmation # is : [id number]    I: SW spoke with patient and they are aware a PAS-RR has been submitted.  JACKIE reviewed with patient that they may be contacted for a follow up appointment within 10 days of hospital discharge if their SNF stay is < 30 days.  Contact information for Senior LinkAge Line was also provided.    A: Patient verbalized understanding.    P: Further questions may be directed to Senior LinkAge Line at #[phone], option #4 for PAS-RR staff.      MATILDE [facility]  Ph: [phone]

## 2017-04-04 NOTE — PLAN OF CARE
Problem: Goal Outcome Summary  Goal: Goal Outcome Summary  Outcome: Adequate for Discharge Date Met:  04/04/17  VSS and denies pain at time of discharge.  All belongings gathered and being sent with patient.  Discharge packet sent with transport.  Patient transitioning to TCU.  Left floor at 11:05 AM via [facility] wheelchair transport.

## 2017-04-04 NOTE — PLAN OF CARE
Problem: Goal Outcome Summary  Goal: Goal Outcome Summary  Outcome: No Change  Plans for DC: Likely tomorrow to [facility]. Ride arranged for 1045. SW following     Neuro/Psych/Sleep: A/O to self, flat affect. Sad/tearful at times.  CV/Tele/Abnormal VS: HR irregular  Resp:  LS dim  Skin/Wound: pale, blanchable redness on coccyx. T/R q2hr.  /GI/Diet: Garcia patent, will d/c w/ garcia d/t retention.  Regular diet, poor appetite. Occasional nausea.  IV: Lost IV access. MD notified. No IV meds ordered.   Pain: denies  Chemo/Radiation: n/a  Abnormal Labs/Glucose: GLU checks with meals and at bedtime.   Activity/Safety: up to commode w/ assist of 1-2/walker/GB.   Consults: completed  Education: Medications, call light, pressure ulcer prevention

## 2017-05-30 NOTE — ED AVS SNAPSHOT
Emergency Department    [address] [facility] [address]    Phone:  [phone]    Fax:  [phone]                                       Reginald Riley   MRN: [id number]    Department:   Emergency Department   Date of Visit:  5/30/2017           Patient Information     Date Of Birth          1940        Your diagnoses for this visit were:     Fall, initial encounter     Closed head injury, initial encounter     Dehydration     Hyperglycemia        You were seen by Lani Matt MD.      Follow-up Information     Follow up with Ashley Lainez    Specialty:  Family Medicine - Geriatric Medicine    Contact information:    [address]  [facility] [address] [phone]          Follow up with  Emergency Department.    Specialty:  EMERGENCY MEDICINE    Why:  If symptoms worsen    Contact information:    [address] [facility] [id number] [phone]        Discharge Instructions         Dehydration (Adult)  Dehydration occurs when your body loses too much fluid. This may be the result of prolonged vomiting or diarrhea, excessive sweating, or a high fever. It may also happen if you don t drink enough fluid when you re sick or out in the heat. Misuse of diuretics (water pills) can also be a cause.  Symptoms include thirst and decreased urine output. You may also feel dizzy, weak, fatigued, or very drowsy. The diet described below is usually enough to treat dehydration. In some cases, you may need medicine.  Home care    Drink at least 12 8-ounce glasses of fluid every day to resolve the dehydration. Fluid may include water; orange juice; lemonade; apple, grape, or cranberry juice; clear fruit drinks; electrolyte replacement and sports drinks; and teas and coffee without caffeine. If you have been diagnosed with a kidney disease, ask your doctor how much and what types of fluids you should drink to prevent dehydration. If you have  kidney disease, fluid can build up in the body. This can be dangerous to your health.     If you have a fever, muscle aches, or a headache as a result of a cold or flu, you may take acetaminophen or ibuprofen, unless another medicine was prescribed. If you have chronic liver or kidney disease, or have ever had a stomach ulcer or gastrointestinal bleeding, talk with your health care provider before using these medicines. Don't take aspirin if you are younger than 18 and have a fever. Aspirin raises the chance for severe liver injury.  Follow-up care  Follow up with your health care provider, or as advised.  When to seek medical advice  Call your health care provider right away if any of these occur:    Continued vomiting    Frequent diarrhea (more than 5 times a day); blood (red or black color) or mucus in diarrhea    Blood in vomit or stool    Swollen abdomen or increasing abdominal pain    Weakness, dizziness, or fainting    Unusual drowsiness or confusion    Reduced urine output or extreme thirst    Fever of 100.4 F (34 C) or higher     4045-3126 [employer]. [address] [place] [address]. All rights reserved. This information is not intended as a substitute for professional medical care. Always follow your healthcare professional's instructions.          Mechanical Fall  You have had a fall today. It appears that the cause is  mechanical . That means that you slipped, tripped or lost your balance. If your fall had been due to fainting or a seizure, further tests would be required.  Home Care:  1. Rest today and resume your normal activities when you are feeling back to normal.  2. If you were injured during the fall, follow the advice from your doctor regarding care of your injury.  3. You may use acetaminophen (Tylenol) or ibuprofen (Motrin, Advil) to control pain, unless another pain medicine was prescribed. [NOTE: If you have chronic liver or kidney disease or ever had a stomach ulcer  or GI bleeding, talk with your doctor before using these medicines.]  Fall Prevention:    Was there anything that caused your fall that can be fixed, removed, or replaced?    Make your home safe by keeping walkways clear of objects you may trip over.    Use non-slip pads under rugs.    Do not walk in poorly lit areas.    Do not stand on chairs or wobbly ladders.    Use caution when reaching overhead or looking upward. This position can cause a loss of balance.    Be sure your shoes fit properly, have non-slip bottoms and are in good condition.    Be cautious when going up and down curbs, and walking on uneven sidewalks.    If your balance is poor, consider using a cane or walker.    Stay as active as you can. Balance, flexibility, strength, and endurance all come from exercise. They all play a role in preventing falls.  Follow Up  with your doctor or as advised by our staff.  Get Prompt Medical Attention  if any of the following occur:    Repeated mechanical falls, or unexplained falls    Dizziness, fainting or seizure    Severe headache    Chest pain or shortness of breath    Palpitations (very rapid or very slow or irregular heartbeat)    Blood in vomit, stools (black or red color)    Weakness of an arm or leg or one side of the face    Difficulty with speech or vision    1564-2233 [employer]. [address] [place] [address], [place] [address]. All rights reserved. This information is not intended as a substitute for professional medical care. Always follow your healthcare professional's instructions.           * HEAD INJURY, no wake-up (Adult)    You have had a head injury. It does not appear serious at this time. Sometimes symptoms of a more serious problem (concussion, bruising or bleeding in the brain) may appear later. Therefore, watch for the warning signs listed below.  HOME CARE:    During the next 24 hours someone must stay with you to check for the signs below. It is not necessary to stay awake or  be awakened during the night.    If you have swelling of the face or scalp, apply an ice pack (ice cubes in a plastic bag, wrapped in a towel) for 20 minutes. Do this every 1-2 hours until the swelling starts to go down.    You may use acetaminophen (Tylenol) 650-1000 mg every 6 hours or ibuprofen (Motrin, Advil) 600 mg every 6-8 hours with food to control pain, if you are able to take these medicines. [NOTE: If you have chronic liver or kidney disease or ever had a stomach ulcer or GI bleeding, talk with your doctor before using these medicines.] Do not take aspirin after a head injury.    For the next 24 hours:    Do not take alcohol, sedatives or medicines that make you sleepy.    Do not drive or operate machinery.    Avoid strenuous activities. No lifting or straining.    If you have had any symptoms of a concussion today (nausea, vomiting, dizziness, confusion, headache, memory loss or if you were knocked out), do not return to sports or any activity that could result in another head injury until 2 full weeks after all symptoms are gone and you have been cleared by your doctor. A second head injury before fully recovering from the first one can lead to serious brain injury.  FOLLOW UP with your doctor if symptoms are not improving after 24 hours, or as directed.  GET PROMPT MEDICAL ATTENTION if any of the following warning signs occur:    Repeated vomiting    Severe or worsening headache or dizziness    Unusual drowsiness, or unable to awaken as usual    Confusion or change in behavior or speech, memory loss, blurred vision    Convulsion (seizure)    Increasing scalp or face swelling    Redness, warmth or pus from the swollen area    Fluid drainage or bleeding from the nose or ears    6091-5837 [address] [address]. All rights reserved. This information is not intended as a substitute for professional medical care. Always follow your healthcare professional's  instructions.      High Blood Sugar (Hyperglycemia)  Too much glucose (sugar) in your blood is called hyperglycemia or high blood sugar. High blood sugar can lead to a dangerous condition called ketoacidosis. In severe cases, it can lead to coma.    Possible causes of hyperglycemia    Inadequate treatment plan for diabetes     Being sick    Being under stress    Taking certain medications, such as steroids    Eating too much food, especially carbohydrates    Being less active than usual    Not taking enough diabetes medication  Symptoms of hyperglycemia  Hyperglycemia may not cause symptoms. If you do have symptoms, they may include:    Thirst    Frequent need to urinate    Feeling tired    Nausea    Itchy, dry skin    Blurry vision    Fast breathing    Weakness    Dizziness    Wounds or skin infections that don t heal    Unexplained weight loss if hyperglycemia lasts for more than a few days   What you should do    Check your blood sugar.    Drink plenty of sugar-free, caffeine-free liquids such as water. Don t drink fruit juice.    Check your blood sugar again every 4 hours. If you take insulin or diabetes medications, follow your sick-day plan for taking medication. Call your healthcare provider if you are not able to eat.    Check your blood or urine for ketones as directed.    Call your health care provider if your blood sugar and ketones do not return to your target range.  Preventing high blood sugar  To help keep your blood sugar from getting too high:    Control stress.    When you're ill, follow your sick-day plan.     Follow your meal plan. Eat only the amount of food on your meal plan    Follow your exercise plan.    Take your insulin or diabetes medications as directed by you health care team. Also test your blood sugar as directed. If the plan is not working for you, discuss it with your doctor.  Other things to do    Carry a medical ID card or wear a medical alert bracelet. It should say that you have  diabetes. It should also say what to do in case you pass out or go into a coma.    Make sure family, friends, and coworkers know the signs of high blood sugar. Tell them what to do if your blood sugar gets very high and you can t help yourself.    Talk to your health care team about other things you can do to prevent high blood sugar.   Special note: Drink plenty of sugar-free and caffeine-free liquids when you feel symptoms of hyperglycemia. Call your doctor if you keep having episodes of hyperglycemia.        0628-5070 [employer]. [address] [place] [address]. All rights reserved. This information is not intended as a substitute for professional medical care. Always follow your healthcare professional's instructions.          24 Hour Appointment Hotline       To make an appointment at any [facility], call [phone] ([phone]). If you don't have a family doctor or clinic, we will help you find one. [facility] are conveniently located to serve the needs of you and your family.             Review of your medicines      Our records show that you are taking the medicines listed below. If these are incorrect, please call your family doctor or clinic.        Dose / Directions Last dose taken    ACETAMINOPHEN PO   Dose:  1000 mg        Take 1,000 mg by mouth 3 times daily For shoulder pain.   Refills:  0        * amylase-lipase-protease 86581 UNITS Cpep   Commonly known as:  CREON 12   Dose:  1 capsule        Take 1 capsule by mouth Take with snacks or supplements Give at 20:00   Refills:  0        * amylase-lipase-protease 43015 UNITS Cpep   Commonly known as:  CREON 12   Dose:  2 capsule        Take 2 capsules by mouth 3 times daily (with meals) Take at 08:00, 12:00, 17:00   Refills:  0        bisacodyl 10 MG Suppository   Commonly known as:  DULCOLAX   Dose:  10 mg        Place 10 mg rectally daily as needed for constipation   Refills:  0        buPROPion 100 MG 12 hr  tablet   Commonly known as:  WELLBUTRIN SR   Dose:  100 mg   Quantity:  60 tablet        Take 1 tablet (100 mg) by mouth 2 times daily (with meals)   Refills:  0        COMPAZINE PO   Dose:  5 mg        Take 5 mg by mouth every 6 hours as needed for nausea   Refills:  0        insulin glargine 100 UNIT/ML injection   Commonly known as:  LANTUS   Dose:  10 Units        Inject 10 Units Subcutaneous At Bedtime   Refills:  0        insulin lispro 100 UNIT/ML injection   Commonly known as:  HumaLOG KWIKpen        1 unit per carb unit with breakfast, lunch and dinner (wait to see how much he eats and then give appropriate coverage)   Refills:  0        mirtazapine 7.5 MG Tabs tablet   Commonly known as:  REMERON   Dose:  7.5 mg   Quantity:  30 tablet        Take 1 tablet (7.5 mg) by mouth At Bedtime   Refills:  0        polyethylene glycol Packet   Commonly known as:  MIRALAX/GLYCOLAX   Dose:  17 g        Take 17 g by mouth daily as needed for constipation   Refills:  0        PRILOSEC PO   Dose:  20 mg        Take 20 mg by mouth every morning   Refills:  0        RIVAROXABAN PO   Dose:  20 mg        Take 20 mg by mouth daily   Refills:  0        senna-docusate 8.6-50 MG per tablet   Commonly known as:  SENOKOT-S;PERICOLACE   Dose:  2 tablet   Indication:  Constipation        Take 2 tablets by mouth 3 times daily   Refills:  0        tamsulosin 0.4 MG capsule   Commonly known as:  FLOMAX   Dose:  0.4 mg   Quantity:  30 capsule        Take 1 capsule (0.4 mg) by mouth daily   Refills:  0        * Notice:  This list has 2 medication(s) that are the same as other medications prescribed for you. Read the directions carefully, and ask your doctor or other care provider to review them with you.            Procedures and tests performed during your visit     Basic metabolic panel    CBC with platelets differential    CT Head w/o Contrast    INR      Orders Needing Specimen Collection     None      Pending Results     No orders  found for last 3 day(s).            Pending Culture Results     No orders found for last 3 day(s).            Pending Results Instructions     If you had any lab results that were not finalized at the time of your Discharge, you can call the ED Lab Result RN at [phone]. You will be contacted by this team for any positive Lab results or changes in treatment. The nurses are available 7 days a week from 10A to 6:30P.  You can leave a message 24 hours per day and they will return your call.        Test Results From Your Hospital Stay        5/30/2017 11:14 PM      Narrative     CT HEAD W/O CONTRAST 5/30/2017 11:10 PM      HISTORY: Fall. Head injury.    TECHNIQUE: Routine noncontrast head CT. Radiation dose for this scan  was reduced using automated exposure control, adjustment of the mA  and/or kV according to patient size, or iterative reconstruction  technique.    COMPARISON: None.    FINDINGS: Brain volume is within normal limits for age. No mass, mass  effect or intracranial hemorrhage. No evidence of acute infarct.   Periventricular low attenuation is consistent with chronic small  vessel ischemic disease. The visualized paranasal sinuses are clear.        Impression     IMPRESSION:  No acute abnormality.    ANAI MATHEW MD         5/31/2017 12:15 AM      Component Results     Component Value Ref Range & Units Status    Sodium 139 133 - 144 mmol/L Final    Potassium 4.3 3.4 - 5.3 mmol/L Final    Chloride 104 94 - 109 mmol/L Final    Carbon Dioxide 30 20 - 32 mmol/L Final    Anion Gap 5 3 - 14 mmol/L Final    Glucose 220 (H) 70 - 99 mg/dL Final    Urea Nitrogen 24 7 - 30 mg/dL Final    Creatinine 0.67 0.66 - 1.25 mg/dL Final    GFR Estimate >90  Non  GFR Calc   >60 mL/min/1.7m2 Final    GFR Estimate If Black >90   GFR Calc   >60 mL/min/1.7m2 Final    Calcium 8.9 8.5 - 10.1 mg/dL Final         5/30/2017 11:58 PM      Component Results     Component Value Ref Range & Units  Status    WBC 4.3 4.0 - 11.0 10e9/L Final    RBC Count 3.92 (L) 4.4 - 5.9 10e12/L Final    Hemoglobin 11.2 (L) 13.3 - 17.7 g/dL Final    Hematocrit 34.8 (L) 40.0 - 53.0 % Final    MCV 89 78 - 100 fl Final    MCH 28.6 26.5 - 33.0 pg Final    MCHC 32.2 31.5 - 36.5 g/dL Final    RDW 15.1 (H) 10.0 - 15.0 % Final    Platelet Count 179 150 - 450 10e9/L Final    Diff Method Automated Method  Final    % Neutrophils 55.7 % Final    % Lymphocytes 29.6 % Final    % Monocytes 10.6 % Final    % Eosinophils 3.2 % Final    % Basophils 0.7 % Final    % Immature Granulocytes 0.2 % Final    Nucleated RBCs 0 0 /100 Final    Absolute Neutrophil 2.4 1.6 - 8.3 10e9/L Final    Absolute Lymphocytes 1.3 0.8 - 5.3 10e9/L Final    Absolute Monocytes 0.5 0.0 - 1.3 10e9/L Final    Absolute Eosinophils 0.1 0.0 - 0.7 10e9/L Final    Absolute Basophils 0.0 0.0 - 0.2 10e9/L Final    Abs Immature Granulocytes 0.0 0 - 0.4 10e9/L Final    Absolute Nucleated RBC 0.0  Final         5/31/2017 12:07 AM      Component Results     Component Value Ref Range & Units Status    INR 1.06 0.86 - 1.14 Final                Clinical Quality Measure: Blood Pressure Screening     Your blood pressure was checked while you were in the emergency department today. The last reading we obtained was  BP: 104/71 . Please read the guidelines below about what these numbers mean and what you should do about them.  If your systolic blood pressure (the top number) is less than 120 and your diastolic blood pressure (the bottom number) is less than 80, then your blood pressure is normal. There is nothing more that you need to do about it.  If your systolic blood pressure (the top number) is 120-139 or your diastolic blood pressure (the bottom number) is 80-89, your blood pressure may be higher than it should be. You should have your blood pressure rechecked within a year by a primary care provider.  If your systolic blood pressure (the top number) is 140 or greater or your diastolic  "blood pressure (the bottom number) is 90 or greater, you may have high blood pressure. High blood pressure is treatable, but if left untreated over time it can put you at risk for heart attack, stroke, or kidney failure. You should have your blood pressure rechecked by a primary care provider within the next 4 weeks.  If your provider in the emergency department today gave you specific instructions to follow-up with your doctor or provider even sooner than that, you should follow that instruction and not wait for up to 4 weeks for your follow-up visit.        Thank you for choosing [facility]       Thank you for choosing [facility] for your care. Our goal is always to provide you with excellent care. Hearing back from our patients is one way we can continue to improve our services. Please take a few minutes to complete the written survey that you may receive in the mail after you visit with us. Thank you!        [employer] Information     [employer] lets you send messages to your doctor, view your test results, renew your prescriptions, schedule appointments and more. To sign up, go to [email or web address] . Click on \"Log in\" on the left side of the screen, which will take you to the Welcome page. Then click on \"Sign up Now\" on the right side of the page.     You will be asked to enter the access code listed below, as well as some personal information. Please follow the directions to create your username and password.     Your access code is: [email or web address]  Expires: 2017 11:07 AM     Your access code will  in 90 days. If you need help or a new code, please call your [facility] clinic or [phone].        Care EveryWhere ID     This is your Care EveryWhere ID. This could be used by other organizations to access your [facility] medical records  [id number]        After Visit Summary       This is your record. Keep this with you and show to your community pharmacist(s) and doctor(s) at your next visit.             "

## 2017-05-30 NOTE — ED AVS SNAPSHOT
Emergency Department    [address] [place] [address]    Phone:  [phone]    Fax:  [phone]                                       Reginald Riley   MRN: [id number]    Department:   Emergency Department   Date of Visit:  5/30/2017           After Visit Summary Signature Page     I have received my discharge instructions, and my questions have been answered. I have discussed any challenges I see with this plan with the nurse or doctor.    ..........................................................................................................................................  Patient/Patient Representative Signature      ..........................................................................................................................................  Patient Representative Print Name and Relationship to Patient    ..................................................               ................................................  Date                                            Time    ..........................................................................................................................................  Reviewed by Signature/Title    ...................................................              ..............................................  Date                                                            Time

## 2017-05-31 NOTE — ED NOTES
Bed: ED06  Expected date:   Expected time:   Means of arrival:   Comments:  [place] [address] Fall at 1400 76 male

## 2017-05-31 NOTE — DISCHARGE INSTRUCTIONS
Dehydration (Adult)  Dehydration occurs when your body loses too much fluid. This may be the result of prolonged vomiting or diarrhea, excessive sweating, or a high fever. It may also happen if you don t drink enough fluid when you re sick or out in the heat. Misuse of diuretics (water pills) can also be a cause.  Symptoms include thirst and decreased urine output. You may also feel dizzy, weak, fatigued, or very drowsy. The diet described below is usually enough to treat dehydration. In some cases, you may need medicine.  Home care    Drink at least 12 8-ounce glasses of fluid every day to resolve the dehydration. Fluid may include water; orange juice; lemonade; apple, grape, or cranberry juice; clear fruit drinks; electrolyte replacement and sports drinks; and teas and coffee without caffeine. If you have been diagnosed with a kidney disease, ask your doctor how much and what types of fluids you should drink to prevent dehydration. If you have kidney disease, fluid can build up in the body. This can be dangerous to your health.     If you have a fever, muscle aches, or a headache as a result of a cold or flu, you may take acetaminophen or ibuprofen, unless another medicine was prescribed. If you have chronic liver or kidney disease, or have ever had a stomach ulcer or gastrointestinal bleeding, talk with your health care provider before using these medicines. Don't take aspirin if you are younger than 18 and have a fever. Aspirin raises the chance for severe liver injury.  Follow-up care  Follow up with your health care provider, or as advised.  When to seek medical advice  Call your health care provider right away if any of these occur:    Continued vomiting    Frequent diarrhea (more than 5 times a day); blood (red or black color) or mucus in diarrhea    Blood in vomit or stool    Swollen abdomen or increasing abdominal pain    Weakness, dizziness, or fainting    Unusual drowsiness or confusion    Reduced  urine output or extreme thirst    Fever of 100.4 F (34 C) or higher     9681-0075 [employer]. [address] [place] [address], [place] [address]. All rights reserved. This information is not intended as a substitute for professional medical care. Always follow your healthcare professional's instructions.          Mechanical Fall  You have had a fall today. It appears that the cause is  mechanical . That means that you slipped, tripped or lost your balance. If your fall had been due to fainting or a seizure, further tests would be required.  Home Care:  1. Rest today and resume your normal activities when you are feeling back to normal.  2. If you were injured during the fall, follow the advice from your doctor regarding care of your injury.  3. You may use acetaminophen (Tylenol) or ibuprofen (Motrin, Advil) to control pain, unless another pain medicine was prescribed. [NOTE: If you have chronic liver or kidney disease or ever had a stomach ulcer or GI bleeding, talk with your doctor before using these medicines.]  Fall Prevention:    Was there anything that caused your fall that can be fixed, removed, or replaced?    Make your home safe by keeping walkways clear of objects you may trip over.    Use non-slip pads under rugs.    Do not walk in poorly lit areas.    Do not stand on chairs or wobbly ladders.    Use caution when reaching overhead or looking upward. This position can cause a loss of balance.    Be sure your shoes fit properly, have non-slip bottoms and are in good condition.    Be cautious when going up and down curbs, and walking on uneven sidewalks.    If your balance is poor, consider using a cane or walker.    Stay as active as you can. Balance, flexibility, strength, and endurance all come from exercise. They all play a role in preventing falls.  Follow Up  with your doctor or as advised by our staff.  Get Prompt Medical Attention  if any of the following occur:    Repeated mechanical falls, or  unexplained falls    Dizziness, fainting or seizure    Severe headache    Chest pain or shortness of breath    Palpitations (very rapid or very slow or irregular heartbeat)    Blood in vomit, stools (black or red color)    Weakness of an arm or leg or one side of the face    Difficulty with speech or vision    6066-6616 [employer]. [address] [place] [address]. All rights reserved. This information is not intended as a substitute for professional medical care. Always follow your healthcare professional's instructions.           * HEAD INJURY, no wake-up (Adult)    You have had a head injury. It does not appear serious at this time. Sometimes symptoms of a more serious problem (concussion, bruising or bleeding in the brain) may appear later. Therefore, watch for the warning signs listed below.  HOME CARE:    During the next 24 hours someone must stay with you to check for the signs below. It is not necessary to stay awake or be awakened during the night.    If you have swelling of the face or scalp, apply an ice pack (ice cubes in a plastic bag, wrapped in a towel) for 20 minutes. Do this every 1-2 hours until the swelling starts to go down.    You may use acetaminophen (Tylenol) 650-1000 mg every 6 hours or ibuprofen (Motrin, Advil) 600 mg every 6-8 hours with food to control pain, if you are able to take these medicines. [NOTE: If you have chronic liver or kidney disease or ever had a stomach ulcer or GI bleeding, talk with your doctor before using these medicines.] Do not take aspirin after a head injury.    For the next 24 hours:    Do not take alcohol, sedatives or medicines that make you sleepy.    Do not drive or operate machinery.    Avoid strenuous activities. No lifting or straining.    If you have had any symptoms of a concussion today (nausea, vomiting, dizziness, confusion, headache, memory loss or if you were knocked out), do not return to sports or any activity that could  result in another head injury until 2 full weeks after all symptoms are gone and you have been cleared by your doctor. A second head injury before fully recovering from the first one can lead to serious brain injury.  FOLLOW UP with your doctor if symptoms are not improving after 24 hours, or as directed.  GET PROMPT MEDICAL ATTENTION if any of the following warning signs occur:    Repeated vomiting    Severe or worsening headache or dizziness    Unusual drowsiness, or unable to awaken as usual    Confusion or change in behavior or speech, memory loss, blurred vision    Convulsion (seizure)    Increasing scalp or face swelling    Redness, warmth or pus from the swollen area    Fluid drainage or bleeding from the nose or ears    5375-2960 [employer] \[employer] [facility] [place] [facility]\"", [address] [place] [address], [place] [address]. All rights reserved. This information is not intended as a substitute for professional medical care. Always follow your healthcare professional's instructions.      High Blood Sugar (Hyperglycemia)  Too much glucose (sugar) in your blood is called hyperglycemia or high blood sugar. High blood sugar can lead to a dangerous condition called ketoacidosis. In severe cases, it can lead to coma.    Possible causes of hyperglycemia    Inadequate treatment plan for diabetes     Being sick    Being under stress    Taking certain medications, such as steroids    Eating too much food, especially carbohydrates    Being less active than usual    Not taking enough diabetes medication  Symptoms of hyperglycemia  Hyperglycemia may not cause symptoms. If you do have symptoms, they may include:    Thirst    Frequent need to urinate    Feeling tired    Nausea    Itchy, dry skin    Blurry vision    Fast breathing    Weakness    Dizziness    Wounds or skin infections that don t heal    Unexplained weight loss if hyperglycemia lasts for more than a few days   What you should do    Check your blood sugar.    Drink plenty of sugar-free,  caffeine-free liquids such as water. Don t drink fruit juice.    Check your blood sugar again every 4 hours. If you take insulin or diabetes medications, follow your sick-day plan for taking medication. Call your healthcare provider if you are not able to eat.    Check your blood or urine for ketones as directed.    Call your health care provider if your blood sugar and ketones do not return to your target range.  Preventing high blood sugar  To help keep your blood sugar from getting too high:    Control stress.    When you're ill, follow your sick-day plan.     Follow your meal plan. Eat only the amount of food on your meal plan    Follow your exercise plan.    Take your insulin or diabetes medications as directed by you health care team. Also test your blood sugar as directed. If the plan is not working for you, discuss it with your doctor.  Other things to do    Carry a medical ID card or wear a medical alert bracelet. It should say that you have diabetes. It should also say what to do in case you pass out or go into a coma.    Make sure family, friends, and coworkers know the signs of high blood sugar. Tell them what to do if your blood sugar gets very high and you can t help yourself.    Talk to your health care team about other things you can do to prevent high blood sugar.   Special note: Drink plenty of sugar-free and caffeine-free liquids when you feel symptoms of hyperglycemia. Call your doctor if you keep having episodes of hyperglycemia.        5848-1348 [employer]. [address] [place] [address], [place] [address]. All rights reserved. This information is not intended as a substitute for professional medical care. Always follow your healthcare professional's instructions.

## 2017-05-31 NOTE — ED PROVIDER NOTES
"  History     Chief Complaint:  Fall       HPI   Reginald Riley is a 76 year old male with a past medical history of parkinson's disease, atrial fibrillation, diabetes who presents via EMS for evaluation of a fall. The patient reports he was reaching for his phone this afternoon around 1400 when he lost his footing and fell, hitting the back of his head. His nursing home staff did not think the patient needed to be evaluated as he was doing fine afterwards. Staff did not witness the fall, but decided to call 911 as the patient has not as coherent this evening. The patient had a fall a week ago as well. EMT reports the patient was only supposed to have one lidocaine patch on, but they found two on his back. EMT state the patient uses a walker, but he was having a hard time getting his footing while they were trying to help him up due to his slippery socks. The patient is complaining of a headache, but denies any abdominal pain or syncopal episodes.    Allergies:  Compazine  Hmg-Coa-R inhibitors  Isopropyl alcohol     Medications:    Humalog  Wellbutrin  Remeron  Flomax  Compazine  Creon  Acetaminophen  Lantus  Dulcolax  Omeprazole  Prilosec  Senokot  Rivaroxaban  Miralax    Past Medical History:    Atrial fibrillation  Diabetes  GERD  Hyperlipidemia  Parkinson's disease  Pneumonia     Past Surgical History:    History reviewed. No pertinent past surgical history.     Family History:    History reviewed.  No significant family history.     Social History:  Marital Status:  Single   Smoking status: Former smoker  Alcohol use: No      Review of Systems   Gastrointestinal: Negative for abdominal pain.   Neurological: Positive for headaches. Negative for syncope.   All other systems reviewed and are negative.    Physical Exam   First Vitals:  BP: 100/53  Pulse: 86  Temp: 97.3  F (36.3  C)  Resp: 18  Height: 175.3 cm (5' 9\")  Weight: 77.1 kg (170 lb)    Physical Exam  General: Patient is alert and answers questions " appropriately.  HEENT: Head atraumatic    Eyes: pupils equal and reactive. Conjunctiva clear   Nares: patent   Oropharynx: no lesions, uvula midline, no palatal draping, normal voice, no trismus  Neck: Supple without lymphadenopathy, no meningismus  Chest: Heart regular rate and rhythm.   Lungs: Equal clear to auscultation with no wheeze or rales  Abdomen: Soft, non tender, nondistended, normal bowel sounds  Back: No costovertebral angle tenderness, no midline C, T or L spine tenderness  Neuro: Grossly nonfocal, normal speech, strength equal bilaterally but weak in lower extremities from deconditioning, resting tremor noted bilaterally, CN 2-12 intact  Extremities: No deformities, equal radial and DP pulses. No clubbing, cyanosis.  No edema  Skin: Warm and dry with no rash.       Emergency Department Course   Imaging:  Radiographic findings were communicated with the patient who voiced understanding of the findings.    CT Head w/o Contrast  Final Result  IMPRESSION:  No acute abnormality.    ANAI MATHEW MD    Laboratory:  CBC: WBC 4.3 (WNL) HGB 11.2 (L)  (WNL)   BMP: Cr 0.67 (WNL) Glucose 220 (H)  Rest WNL  INR: 1.06 (WNL)    Interventions:  2253: Normal saline, 1000 ml, IV    ED Course:  The patient arrived by ambulance. He was escorted to the ED where his vitals were measured and recorded.   Nursing notes and past medical history reviewed.   I performed a physical examination of the patient as documented above.  I explained the plan with the patient who consents to this.   The above workups were undertaken.   0016: The patient was updated.  I personally reviewed the laboratory results with the Patient and answered all related questions prior to discharge.   Findings and plan explained to the Patient. Patient discharged home with instructions regarding supportive care, medications, and reasons to return. The importance of close follow-up was reviewed.     Impression & Plan      Medical Decision  Making:  Reginald Riley is a 76 year old male who presents for evaluation following a head injury.  The injury was mechanical in nature.  Patient relates  The patient is on xarelto from chart review although EMS stated nursing home states no blood thinners.  The patient has no neurologic deficit.  The patient had no episodes of vomiting.  Imaging was discussed and obtained, based on risk stratification, patient comfort, and symptoms.    Given the mechanism of the injury, the lack of focal neurologic deficit, no LOC, no seizure activity, and negative imaging, I believe serious cranial pathology is unlikely.  CT head confirms lack of serious intracranial injury.  Patient Blood pressure initially low, improved with 1L NS.    Labs otherwise demonstrate elevated blood sugar but do not suspect DKA or HONC.  No neck pain to warrant neck imaging at this time.  No report from patient or EMS of syncope.  The patient is comfortable with discharge home and out-patient follow up.    Diagnosis:    ICD-10-CM    1. Fall, initial encounter W19.XXXA    2. Closed head injury, initial encounter S09.90XA    3. Dehydration E86.0    4. Hyperglycemia R73.9          Disposition:   Discharge to nursing facility with primary care follow up.     IDavid, am serving as a scribe on 5/30/2017 at 10:45 PM to personally document services performed by Lani Matt MD, based on my observations and the provider's statements to me.         Lani Matt MD  05/31/17 0233

## 2022-02-17 PROBLEM — R62.51 FAILURE TO THRIVE IN PEDIATRIC PATIENT: Status: ACTIVE | Noted: 2017-01-01

## 2023-05-28 NOTE — CONSULTS
[facility]    Neurology Consultation     Date of Admission:  3/25/2017  Date of Consult (When I saw the patient): 03/28/17    Assessment & Plan   Reginald Riley is a 76 year old male who was admitted on 3/25/2017 for failure to thrive. He has a history of ampullary cancer, afib, depression.  I was asked to see the patient for parkinsonism and confusion.    AMS and parkinsonism:  --he has some bradykinesia, no tremor, appears more psychomotor retardation to me- particularly with him not answering many of my questions  --parkinson's symptoms are difficult to assess inpatient due to multiple factors, can send patient to outpatient neuro clinic when patient is stable for further eval  --apparently had side effects to reglan, so agree with not continuing this  --Consider PT/OT for likely deconditioning  --basic labs unremarkable other than elevated glucose  --Please call if any further symptoms    I discussed the above diagnosis, assessment, and further testing with the patient.  Nina Canseco MD    Code Status    Full Code        Reason for Consult   Reason for consult: I was asked by Dr. Abdullahi to evaluate this patient for confusion and parkinsonsism.      Chief Complaint   pain    History is obtained from the patient and patient's chart    History of Present Illness   Reginald Riley is a 76 year old male who presents with failure to thrive. He has a history of multiple cancers, depression, afib.  He is not answering many of my questions today.  He does report abdominal pain.  He tells me his name and birthdate.  I'm unable to determine if he walks independently.  He was admitted for failure to thrive.  There is thought that depression is playing a role.  He apparently was previously on reglan- this was stopped, I don't see what symptoms he had in the chart- he denies any symptoms of stiff muscles or excess movement on the medication.    Past Medical History   I have reviewed this  patient's medical history and updated it with pertinent information if needed.   Past Medical History:   Diagnosis Date     A-fib (H)      Diabetes (H)      Gastroesophageal reflux disease      Hyperlipidemia      Parkinson disease (H)      Pneumonia        Past Surgical History   I have reviewed this patient's surgical history and updated it with pertinent information if needed.  History reviewed. No pertinent surgical history.    Prior to Admission Medications   Prior to Admission Medications   Prescriptions Last Dose Informant Patient Reported? Taking?   ACETAMINOPHEN PO 3/25/2017 at 12:00  Yes Yes   Sig: Take 1,000 mg by mouth 3 times daily For shoulder pain.   METFORMIN HCL PO 3/25/2017 at 8:00  Yes Yes   Sig: Take 1,000 mg by mouth 2 times daily (with meals)   Metoclopramide HCl (REGLAN PO) 3/25/2017 at 11:30  Yes Yes   Sig: Take 5 mg by mouth 3 times daily 07:30, 11:30, 17:00   Omeprazole (PRILOSEC PO) 3/25/2017 at 07:00  Yes Yes   Sig: Take 20 mg by mouth every morning   Prochlorperazine Maleate (COMPAZINE PO) prn  Yes Yes   Sig: Take 5 mg by mouth every 6 hours as needed for nausea   RIVAROXABAN PO 3/24/2017 at 17:30  Yes Yes   Sig: Take 20 mg by mouth daily   amylase-lipase-protease (CREON 12) 32892 UNITS CPEP 3/24/2017 at 20:00  Yes Yes   Sig: Take 1 capsule by mouth Take with snacks or supplements Give at 20:00   amylase-lipase-protease (CREON 12) 58625 UNITS CPEP 3/25/2017 at 12:00  Yes Yes   Sig: Take 2 capsules by mouth 3 times daily (with meals) Take at 08:00, 12:00, 17:00   bisacodyl (DULCOLAX) 10 MG Suppository prn  Yes Yes   Sig: Place 10 mg rectally daily as needed for constipation   insulin glargine (LANTUS) 100 UNIT/ML injection 3/24/2017 at hs  Yes Yes   Sig: Inject 10 Units Subcutaneous At Bedtime   insulin lispro (HUMALOG KWIKPEN) 100 UNIT/ML injection 3/25/2017 at 17:15  Yes Yes   Sig: Inject 2 Units Subcutaneous 3 times daily (before meals) 07:30, 11:45, 17:15   polyethylene glycol  (MIRALAX/GLYCOLAX) Packet prn  Yes Yes   Sig: Take 17 g by mouth daily as needed for constipation   senna-docusate (SENOKOT-S;PERICOLACE) 8.6-50 MG per tablet 3/25/2017 at 12:00  Yes Yes   Sig: Take 2 tablets by mouth 3 times daily      Facility-Administered Medications: None     Allergies   Allergies   Allergen Reactions     Compazine [Prochlorperazine]      Hmg-Coa-R Inhibitors      Isopropyl Alcohol        Social History   I have reviewed this patient's social history and updated it with pertinent information if needed. Reginald RODARTE Rosemary  reports that he quit smoking about 31 years ago. He does not have any smokeless tobacco history on file. He reports that he does not drink alcohol.    Family History   I have reviewed this patient's family history and updated it with pertinent information if needed.   No pertinent family history.    Review of Systems   The 10 point Review of Systems is negative other than noted in the HPI- but was limited as patient not answering many of my questions.    Physical Exam   Temp: 96.4  F (35.8  C) Temp src: Axillary BP: 140/72 Pulse: 68 Heart Rate: 72 Resp: 16 SpO2: 97 % O2 Device: None (Room air)    Vital Signs with Ranges  Temp:  [96  F (35.6  C)-97.8  F (36.6  C)] 96.4  F (35.8  C)  Pulse:  [68] 68  Heart Rate:  [68-75] 72  Resp:  [16-18] 16  BP: (103-140)/(45-72) 140/72  SpO2:  [96 %-99 %] 97 %  170 lbs 6.65 oz    General Appearance:  No acute distress  Neuro:       Mental Status Exam:  A,A, thinks it is 2015, would not try for date, can tell me his birthdate       Cranial Nerves:   CN 2-12 examined and intact       Motor:   5/5 BUE and BLE          Reflexes: 1+ B br, bi, knees, 0 achilles, downgoing toes, no clonus       Sensory:  Nl lt x4       Coordination:  fnf normal on right, would not do on left       Gait:  Deferred- appears fall risk  Neck: no nuchal rigidity. No carotid bruits.    Extremities: No clubbing, no cyanosis, + edema  CV: RRR nl s1, s2  Resp: CTAB        Data    Results for orders placed or performed during the hospital encounter of 03/25/17 (from the past 24 hour(s))   Glucose by meter   Result Value Ref Range    Glucose 237 (H) 70 - 99 mg/dL   Glucose by meter   Result Value Ref Range    Glucose 290 (H) 70 - 99 mg/dL   Glucose by meter   Result Value Ref Range    Glucose 345 (H) 70 - 99 mg/dL   Glucose by meter   Result Value Ref Range    Glucose 292 (H) 70 - 99 mg/dL           Imaging and procedures:  I personally reviewed these images.  none       no